# Patient Record
Sex: MALE | Race: WHITE | URBAN - METROPOLITAN AREA
[De-identification: names, ages, dates, MRNs, and addresses within clinical notes are randomized per-mention and may not be internally consistent; named-entity substitution may affect disease eponyms.]

---

## 2023-05-17 ENCOUNTER — HOSPITAL ENCOUNTER (OUTPATIENT)
Age: 73
Setting detail: SPECIMEN
Discharge: HOME | End: 2023-05-17

## 2023-05-17 LAB
ALBUMIN SERPL-MCNC: 3 G/DL (ref 3.5–5.2)
ALP SERPL-CCNC: 103 U/L (ref 40–129)
ALT SERPL-CCNC: 26 U/L (ref 5–41)
ANION GAP SERPL CALCULATED.3IONS-SCNC: 9 MMOL/L (ref 9–17)
AST SERPL-CCNC: 50 U/L
BILIRUB SERPL-MCNC: 3.2 MG/DL (ref 0.3–1.2)
BUN SERPL-MCNC: 9 MG/DL (ref 8–23)
BUN/CREAT SERPL: 12 (ref 9–20)
CALCIUM SERPL-MCNC: 8.1 MG/DL (ref 8.6–10.4)
CHLORIDE SERPL-SCNC: 103 MMOL/L (ref 98–107)
CO2 SERPL-SCNC: 29 MMOL/L (ref 20–31)
CREAT SERPL-MCNC: 0.78 MG/DL (ref 0.7–1.2)
ERYTHROCYTE [DISTWIDTH] IN BLOOD BY AUTOMATED COUNT: 19.3 % (ref 11.8–14.4)
GFR SERPL CREATININE-BSD FRML MDRD: >60 ML/MIN/1.73M2
GLUCOSE SERPL-MCNC: 131 MG/DL (ref 70–99)
HCT VFR BLD AUTO: 33.8 % (ref 40.7–50.3)
HGB BLD-MCNC: 10.1 G/DL (ref 13–17)
MCH RBC QN AUTO: 31.7 PG (ref 25.2–33.5)
MCHC RBC AUTO-ENTMCNC: 29.9 G/DL (ref 28.4–34.8)
MCV RBC AUTO: 106 FL (ref 82.6–102.9)
NRBC AUTOMATED: 0 PER 100 WBC
PLATELET # BLD AUTO: 88 K/UL (ref 138–453)
PMV BLD AUTO: 11.6 FL (ref 8.1–13.5)
POTASSIUM SERPL-SCNC: 3.8 MMOL/L (ref 3.7–5.3)
PROT SERPL-MCNC: 6.5 G/DL (ref 6.4–8.3)
RBC # BLD AUTO: 3.19 M/UL (ref 4.21–5.77)
SODIUM SERPL-SCNC: 141 MMOL/L (ref 135–144)
WBC OTHER # BLD: 6.9 K/UL (ref 3.5–11.3)

## 2023-05-17 PROCEDURE — 36415 COLL VENOUS BLD VENIPUNCTURE: CPT

## 2023-05-17 PROCEDURE — 85027 COMPLETE CBC AUTOMATED: CPT

## 2023-05-17 PROCEDURE — 80053 COMPREHEN METABOLIC PANEL: CPT

## 2023-05-17 PROCEDURE — P9603 ONE-WAY ALLOW PRORATED MILES: HCPCS

## 2023-05-19 ENCOUNTER — HOSPITAL ENCOUNTER (OUTPATIENT)
Age: 73
Setting detail: SPECIMEN
Discharge: HOME | End: 2023-05-19

## 2023-05-19 LAB — AMMONIA PLAS-SCNC: 35 UMOL/L (ref 16–60)

## 2023-05-19 PROCEDURE — 82140 ASSAY OF AMMONIA: CPT

## 2023-05-19 PROCEDURE — P9603 ONE-WAY ALLOW PRORATED MILES: HCPCS

## 2023-05-19 PROCEDURE — 36415 COLL VENOUS BLD VENIPUNCTURE: CPT

## 2023-05-24 ENCOUNTER — HOSPITAL ENCOUNTER (OUTPATIENT)
Age: 73
Setting detail: SPECIMEN
Discharge: HOME | End: 2023-05-24

## 2023-05-24 LAB
AMMONIA PLAS-SCNC: 31 UMOL/L (ref 16–60)
HCT VFR BLD AUTO: 29 % (ref 40.7–50.3)
HGB BLD-MCNC: 8.8 G/DL (ref 13–17)

## 2023-05-24 PROCEDURE — 36415 COLL VENOUS BLD VENIPUNCTURE: CPT

## 2023-05-24 PROCEDURE — P9603 ONE-WAY ALLOW PRORATED MILES: HCPCS

## 2023-05-24 PROCEDURE — 82140 ASSAY OF AMMONIA: CPT

## 2023-05-24 PROCEDURE — 85014 HEMATOCRIT: CPT

## 2023-05-24 PROCEDURE — 85018 HEMOGLOBIN: CPT

## 2023-12-12 ENCOUNTER — HOSPITAL ENCOUNTER (EMERGENCY)
Facility: CLINIC | Age: 73
Discharge: HOME | End: 2023-12-12
Attending: EMERGENCY MEDICINE
Payer: MEDICARE

## 2023-12-12 ENCOUNTER — APPOINTMENT (OUTPATIENT)
Dept: GENERAL RADIOLOGY | Facility: CLINIC | Age: 73
End: 2023-12-12
Payer: MEDICARE

## 2023-12-12 VITALS
SYSTOLIC BLOOD PRESSURE: 105 MMHG | DIASTOLIC BLOOD PRESSURE: 60 MMHG | TEMPERATURE: 97.9 F | RESPIRATION RATE: 18 BRPM | WEIGHT: 198 LBS | HEART RATE: 71 BPM | OXYGEN SATURATION: 99 %

## 2023-12-12 DIAGNOSIS — S70.01XA CONTUSION OF RIGHT HIP, INITIAL ENCOUNTER: Primary | ICD-10-CM

## 2023-12-12 PROCEDURE — 73502 X-RAY EXAM HIP UNI 2-3 VIEWS: CPT

## 2023-12-12 PROCEDURE — 99283 EMERGENCY DEPT VISIT LOW MDM: CPT

## 2023-12-12 RX ORDER — SPIRONOLACTONE 25 MG/1
25 TABLET ORAL
COMMUNITY
Start: 2023-05-17

## 2023-12-12 RX ORDER — MIRTAZAPINE 15 MG/1
15 TABLET, FILM COATED ORAL NIGHTLY
COMMUNITY
Start: 2023-08-25

## 2023-12-12 RX ORDER — HYDROCODONE BITARTRATE AND ACETAMINOPHEN 5; 325 MG/1; MG/1
1 TABLET ORAL EVERY 12 HOURS PRN
Qty: 6 TABLET | Refills: 0 | Status: SHIPPED | OUTPATIENT
Start: 2023-12-12 | End: 2023-12-15

## 2023-12-12 RX ORDER — FUROSEMIDE 40 MG/1
40 TABLET ORAL
COMMUNITY
Start: 2023-05-17

## 2023-12-12 RX ORDER — CARVEDILOL 3.12 MG/1
3.12 TABLET ORAL 2 TIMES DAILY WITH MEALS
COMMUNITY
Start: 2023-05-16

## 2023-12-12 ASSESSMENT — PAIN SCALES - GENERAL: PAINLEVEL_OUTOF10: 2

## 2023-12-12 ASSESSMENT — PAIN - FUNCTIONAL ASSESSMENT: PAIN_FUNCTIONAL_ASSESSMENT: 0-10

## 2024-07-15 ENCOUNTER — APPOINTMENT (OUTPATIENT)
Dept: GENERAL RADIOLOGY | Facility: CLINIC | Age: 74
End: 2024-07-15
Attending: EMERGENCY MEDICINE
Payer: MEDICARE

## 2024-07-15 ENCOUNTER — HOSPITAL ENCOUNTER (EMERGENCY)
Facility: CLINIC | Age: 74
Discharge: HOME | End: 2024-07-15
Attending: EMERGENCY MEDICINE
Payer: MEDICARE

## 2024-07-15 VITALS
TEMPERATURE: 98 F | HEIGHT: 70 IN | SYSTOLIC BLOOD PRESSURE: 112 MMHG | HEART RATE: 68 BPM | RESPIRATION RATE: 16 BRPM | BODY MASS INDEX: 27.2 KG/M2 | DIASTOLIC BLOOD PRESSURE: 76 MMHG | OXYGEN SATURATION: 97 % | WEIGHT: 190 LBS

## 2024-07-15 DIAGNOSIS — J40 BRONCHITIS: Primary | ICD-10-CM

## 2024-07-15 LAB
ALBUMIN SERPL-MCNC: 4 G/DL (ref 3.5–5.2)
ALBUMIN/GLOB SERPL: 1.2 {RATIO} (ref 1–2.5)
ALP SERPL-CCNC: 88 U/L (ref 40–129)
ALT SERPL-CCNC: 12 U/L (ref 5–41)
ANION GAP SERPL CALCULATED.3IONS-SCNC: 12 MMOL/L (ref 9–17)
AST SERPL-CCNC: 38 U/L
BASOPHILS # BLD: 0 K/UL (ref 0–0.2)
BASOPHILS NFR BLD: 1 % (ref 0–2)
BILIRUB SERPL-MCNC: 1.7 MG/DL (ref 0.3–1.2)
BNP SERPL-MCNC: 84 PG/ML
BUN SERPL-MCNC: 17 MG/DL (ref 8–23)
CALCIUM SERPL-MCNC: 9.6 MG/DL (ref 8.6–10.4)
CHLORIDE SERPL-SCNC: 102 MMOL/L (ref 98–107)
CO2 SERPL-SCNC: 26 MMOL/L (ref 20–31)
CREAT SERPL-MCNC: 0.9 MG/DL (ref 0.7–1.2)
EOSINOPHIL # BLD: 0.4 K/UL (ref 0–0.4)
EOSINOPHILS RELATIVE PERCENT: 5 % (ref 1–4)
ERYTHROCYTE [DISTWIDTH] IN BLOOD BY AUTOMATED COUNT: 14.2 % (ref 12.5–15.4)
GFR, ESTIMATED: 89 ML/MIN/1.73M2
GLUCOSE SERPL-MCNC: 113 MG/DL (ref 70–99)
HCT VFR BLD AUTO: 42 % (ref 41–53)
HGB BLD-MCNC: 14.7 G/DL (ref 13.5–17.5)
LACTATE BLDV-SCNC: 2 MMOL/L (ref 0.5–2.2)
LYMPHOCYTES NFR BLD: 1.6 K/UL (ref 1–4.8)
LYMPHOCYTES RELATIVE PERCENT: 22 % (ref 24–44)
MAGNESIUM SERPL-MCNC: 1.9 MG/DL (ref 1.6–2.6)
MCH RBC QN AUTO: 35.2 PG (ref 26–34)
MCHC RBC AUTO-ENTMCNC: 35 G/DL (ref 31–37)
MCV RBC AUTO: 100.7 FL (ref 80–100)
MONOCYTES NFR BLD: 0.6 K/UL (ref 0.1–1.2)
MONOCYTES NFR BLD: 8 % (ref 2–11)
NEUTROPHILS NFR BLD: 64 % (ref 36–66)
NEUTS SEG NFR BLD: 4.5 K/UL (ref 1.8–7.7)
PLATELET # BLD AUTO: 97 K/UL (ref 140–450)
PMV BLD AUTO: 8.3 FL (ref 6–12)
POTASSIUM SERPL-SCNC: 5.2 MMOL/L (ref 3.7–5.3)
PROT SERPL-MCNC: 7.4 G/DL (ref 6.4–8.3)
RBC # BLD AUTO: 4.17 M/UL (ref 4.5–5.9)
SODIUM SERPL-SCNC: 140 MMOL/L (ref 135–144)
TROPONIN I SERPL HS-MCNC: 8 NG/L (ref 0–22)
WBC OTHER # BLD: 7.1 K/UL (ref 3.5–11)

## 2024-07-15 PROCEDURE — 84484 ASSAY OF TROPONIN QUANT: CPT

## 2024-07-15 PROCEDURE — 83735 ASSAY OF MAGNESIUM: CPT

## 2024-07-15 PROCEDURE — 2580000003 HC RX 258: Performed by: EMERGENCY MEDICINE

## 2024-07-15 PROCEDURE — 83605 ASSAY OF LACTIC ACID: CPT

## 2024-07-15 PROCEDURE — 85025 COMPLETE CBC W/AUTO DIFF WBC: CPT

## 2024-07-15 PROCEDURE — 93005 ELECTROCARDIOGRAM TRACING: CPT | Performed by: EMERGENCY MEDICINE

## 2024-07-15 PROCEDURE — 99285 EMERGENCY DEPT VISIT HI MDM: CPT

## 2024-07-15 PROCEDURE — 71045 X-RAY EXAM CHEST 1 VIEW: CPT

## 2024-07-15 PROCEDURE — 96374 THER/PROPH/DIAG INJ IV PUSH: CPT

## 2024-07-15 PROCEDURE — 80053 COMPREHEN METABOLIC PANEL: CPT

## 2024-07-15 PROCEDURE — 36415 COLL VENOUS BLD VENIPUNCTURE: CPT

## 2024-07-15 PROCEDURE — 83880 ASSAY OF NATRIURETIC PEPTIDE: CPT

## 2024-07-15 PROCEDURE — 6370000000 HC RX 637 (ALT 250 FOR IP): Performed by: EMERGENCY MEDICINE

## 2024-07-15 PROCEDURE — 6360000002 HC RX W HCPCS: Performed by: EMERGENCY MEDICINE

## 2024-07-15 RX ORDER — IPRATROPIUM BROMIDE AND ALBUTEROL SULFATE 2.5; .5 MG/3ML; MG/3ML
1 SOLUTION RESPIRATORY (INHALATION) ONCE
Status: COMPLETED | OUTPATIENT
Start: 2024-07-15 | End: 2024-07-15

## 2024-07-15 RX ORDER — PREDNISONE 10 MG/1
10 TABLET ORAL SEE ADMIN INSTRUCTIONS
Qty: 17 TABLET | Refills: 0 | Status: SHIPPED | OUTPATIENT
Start: 2024-07-15 | End: 2024-07-21

## 2024-07-15 RX ORDER — AZITHROMYCIN 250 MG/1
TABLET, FILM COATED ORAL
Qty: 1 PACKET | Refills: 0 | Status: SHIPPED | OUTPATIENT
Start: 2024-07-15

## 2024-07-15 RX ADMIN — WATER 125 MG: 1 INJECTION INTRAMUSCULAR; INTRAVENOUS; SUBCUTANEOUS at 13:04

## 2024-07-15 RX ADMIN — IPRATROPIUM BROMIDE AND ALBUTEROL SULFATE 1 DOSE: .5; 2.5 SOLUTION RESPIRATORY (INHALATION) at 11:37

## 2024-07-15 ASSESSMENT — ENCOUNTER SYMPTOMS
SHORTNESS OF BREATH: 1
CHOKING: 1
DIARRHEA: 0
VOMITING: 0
SORE THROAT: 0

## 2024-07-15 NOTE — ED PROVIDER NOTES
[facility]  [address] [facility] [address]  Phone: [phone]        [facility]  EMERGENCY DEPARTMENT ENCOUNTER      Pt Name: Aiden Black  MRN: [id number]  Birthdate 1950  Date of evaluation: 7/15/2024  Provider: Aiden Shah DO    CHIEF COMPLAINT       Chief Complaint   Patient presents with    Shortness of Breath    Cough         HISTORY OF PRESENT ILLNESS   (Location/Symptom, Timing/Onset,Context/Setting, Quality, Duration, Modifying Factors, Severity)  Note limiting factors.   Aiden Black is a 73 y.o. male who presents to the emergency department for the evaluation of mild cough and shortness of breath.  His wife states has been going on for about 3 days.  Patient does not really have capability of speaking a whole lot but this is not new, she said he was intubated about a year ago and his voice has not been the same since.  He has been increasingly short of breath with even routine activities over the past few days.  Seems to be coughing as well.  No fever.  No vomiting.  No sick contact.  Denies chest pain.  No swelling in the legs.  He has been doing some breathing treatments at home which she says helps.  He says he feels fine at the moment    Nursing Notes were reviewed.    REVIEW OF SYSTEMS    (2-9systems for level 4, 10 or more for level 5)     Review of Systems   Constitutional:  Negative for fever.   HENT:  Negative for sore throat.    Respiratory:  Positive for choking and shortness of breath.    Cardiovascular:  Negative for chest pain.   Gastrointestinal:  Negative for diarrhea and vomiting.   Genitourinary:  Negative for dysuria.   Skin:  Negative for rash.   Neurological:  Negative for weakness.   All other systems reviewed and are negative.      Except asnoted above the remainder of the review of systems was reviewed and negative.       PAST MEDICAL HISTORY     Past Medical History:   Diagnosis Date    Arthritis     Cirrhosis (HCC)     Emphysema lung

## 2024-07-16 LAB
EKG ATRIAL RATE: 68 BPM
EKG P AXIS: -26 DEGREES
EKG P-R INTERVAL: 196 MS
EKG Q-T INTERVAL: 408 MS
EKG QRS DURATION: 78 MS
EKG QTC CALCULATION (BAZETT): 433 MS
EKG R AXIS: 57 DEGREES
EKG T AXIS: 77 DEGREES
EKG VENTRICULAR RATE: 68 BPM

## 2024-08-09 ENCOUNTER — APPOINTMENT (OUTPATIENT)
Dept: GENERAL RADIOLOGY | Facility: CLINIC | Age: 74
End: 2024-08-09
Attending: EMERGENCY MEDICINE
Payer: MEDICARE

## 2024-08-09 ENCOUNTER — HOSPITAL ENCOUNTER (INPATIENT)
Age: 74
LOS: 12 days | Discharge: HOSPICE | End: 2024-08-22
Attending: EMERGENCY MEDICINE | Admitting: FAMILY MEDICINE
Payer: MEDICARE

## 2024-08-09 DIAGNOSIS — R00.1 BRADYCARDIA: ICD-10-CM

## 2024-08-09 DIAGNOSIS — J44.1 COPD EXACERBATION (HCC): Primary | ICD-10-CM

## 2024-08-09 DIAGNOSIS — R09.02 HYPOXEMIA: ICD-10-CM

## 2024-08-09 LAB
ALBUMIN SERPL-MCNC: 3.7 G/DL (ref 3.5–5.2)
ALBUMIN/GLOB SERPL: 1.1 {RATIO} (ref 1–2.5)
ALP SERPL-CCNC: 122 U/L (ref 40–129)
ALT SERPL-CCNC: 19 U/L (ref 5–41)
ANION GAP SERPL CALCULATED.3IONS-SCNC: 9 MMOL/L (ref 9–17)
AST SERPL-CCNC: 38 U/L
BASOPHILS # BLD: 0 K/UL (ref 0–0.2)
BASOPHILS NFR BLD: 1 % (ref 0–2)
BILIRUB SERPL-MCNC: 1.3 MG/DL (ref 0.3–1.2)
BNP SERPL-MCNC: 160 PG/ML
BUN SERPL-MCNC: 16 MG/DL (ref 8–23)
CALCIUM SERPL-MCNC: 9.4 MG/DL (ref 8.6–10.4)
CHLORIDE SERPL-SCNC: 100 MMOL/L (ref 98–107)
CO2 SERPL-SCNC: 31 MMOL/L (ref 20–31)
CREAT SERPL-MCNC: 1 MG/DL (ref 0.7–1.2)
EOSINOPHIL # BLD: 0.2 K/UL (ref 0–0.4)
EOSINOPHILS RELATIVE PERCENT: 4 % (ref 1–4)
ERYTHROCYTE [DISTWIDTH] IN BLOOD BY AUTOMATED COUNT: 14.1 % (ref 12.5–15.4)
GFR, ESTIMATED: 78 ML/MIN/1.73M2
GLUCOSE SERPL-MCNC: 104 MG/DL (ref 70–99)
HCT VFR BLD AUTO: 42.6 % (ref 41–53)
HGB BLD-MCNC: 14.8 G/DL (ref 13.5–17.5)
LACTATE BLDV-SCNC: 1.7 MMOL/L (ref 0.5–2.2)
LYMPHOCYTES NFR BLD: 1.9 K/UL (ref 1–4.8)
LYMPHOCYTES RELATIVE PERCENT: 27 % (ref 24–44)
MCH RBC QN AUTO: 35.3 PG (ref 26–34)
MCHC RBC AUTO-ENTMCNC: 34.8 G/DL (ref 31–37)
MCV RBC AUTO: 101.5 FL (ref 80–100)
MONOCYTES NFR BLD: 0.6 K/UL (ref 0.1–1.2)
MONOCYTES NFR BLD: 9 % (ref 2–11)
NEUTROPHILS NFR BLD: 59 % (ref 36–66)
NEUTS SEG NFR BLD: 4.1 K/UL (ref 1.8–7.7)
PLATELET # BLD AUTO: 79 K/UL (ref 140–450)
PMV BLD AUTO: 8.3 FL (ref 6–12)
POTASSIUM SERPL-SCNC: 4.9 MMOL/L (ref 3.7–5.3)
PROT SERPL-MCNC: 7 G/DL (ref 6.4–8.3)
RBC # BLD AUTO: 4.19 M/UL (ref 4.5–5.9)
SARS-COV-2 RDRP RESP QL NAA+PROBE: NOT DETECTED
SODIUM SERPL-SCNC: 140 MMOL/L (ref 135–144)
SPECIMEN DESCRIPTION: NORMAL
TROPONIN I SERPL HS-MCNC: 11 NG/L (ref 0–22)
WBC OTHER # BLD: 6.9 K/UL (ref 3.5–11)

## 2024-08-09 PROCEDURE — 87635 SARS-COV-2 COVID-19 AMP PRB: CPT

## 2024-08-09 PROCEDURE — 36415 COLL VENOUS BLD VENIPUNCTURE: CPT

## 2024-08-09 PROCEDURE — 2580000003 HC RX 258: Performed by: EMERGENCY MEDICINE

## 2024-08-09 PROCEDURE — 6370000000 HC RX 637 (ALT 250 FOR IP): Performed by: EMERGENCY MEDICINE

## 2024-08-09 PROCEDURE — 96374 THER/PROPH/DIAG INJ IV PUSH: CPT

## 2024-08-09 PROCEDURE — 71045 X-RAY EXAM CHEST 1 VIEW: CPT

## 2024-08-09 PROCEDURE — 83605 ASSAY OF LACTIC ACID: CPT

## 2024-08-09 PROCEDURE — 93005 ELECTROCARDIOGRAM TRACING: CPT | Performed by: EMERGENCY MEDICINE

## 2024-08-09 PROCEDURE — 99285 EMERGENCY DEPT VISIT HI MDM: CPT

## 2024-08-09 PROCEDURE — 84484 ASSAY OF TROPONIN QUANT: CPT

## 2024-08-09 PROCEDURE — 80053 COMPREHEN METABOLIC PANEL: CPT

## 2024-08-09 PROCEDURE — 6360000002 HC RX W HCPCS: Performed by: EMERGENCY MEDICINE

## 2024-08-09 PROCEDURE — 83880 ASSAY OF NATRIURETIC PEPTIDE: CPT

## 2024-08-09 PROCEDURE — 85025 COMPLETE CBC W/AUTO DIFF WBC: CPT

## 2024-08-09 RX ORDER — IPRATROPIUM BROMIDE AND ALBUTEROL SULFATE 2.5; .5 MG/3ML; MG/3ML
1 SOLUTION RESPIRATORY (INHALATION) ONCE
Status: COMPLETED | OUTPATIENT
Start: 2024-08-09 | End: 2024-08-09

## 2024-08-09 RX ORDER — FAMOTIDINE 40 MG/1
40 TABLET, FILM COATED ORAL EVERY EVENING
COMMUNITY

## 2024-08-09 RX ADMIN — IPRATROPIUM BROMIDE AND ALBUTEROL SULFATE 1 DOSE: 2.5; .5 SOLUTION RESPIRATORY (INHALATION) at 23:20

## 2024-08-09 RX ADMIN — IPRATROPIUM BROMIDE AND ALBUTEROL SULFATE 1 DOSE: 2.5; .5 SOLUTION RESPIRATORY (INHALATION) at 18:56

## 2024-08-09 RX ADMIN — WATER 125 MG: 1 INJECTION INTRAMUSCULAR; INTRAVENOUS; SUBCUTANEOUS at 18:59

## 2024-08-09 NOTE — ED PROVIDER NOTES
[facility]  [address] [facility] [address]  Phone: [phone]    eMERGENCY dEPARTMENT eNCOUnter        Pt Name: Aiden Black  MRN: [id number]  Birthdate 1950  Date of evaluation: 8/9/24    CHIEF COMPLAINT     Chief Complaint   Patient presents with    Shortness of Breath       HISTORY OF PRESENT ILLNESS    Aiden Black is a 73 y.o. male who presents to the emergency department from home accompanied by family with shortness of breath.  The patient wife provided much of the details stating that since his discharge recently he has not been feeling well nor recovered.  He was seen and evaluated here in the emergency department about a month ago for something similar was placed on nebulizer breathing treatments for which the patient initiated for the first time last evening however he did complete the antibiotic that was prescribed.  Since then he did follow-up with his family doctor who represcribed in the [facility] and informed him to continue with his nebulizer breathing treatment.  His wife stated that things have progressed which was most notably noted last evening when he took a breathing treatment for the first time.  He has had cough and congestion.  He denies any fevers or chills.  He has chest pain and wheezing and chest tightness.  No abdominal pain nausea vomiting diarrhea.  No leg pain or swelling.  No recent injury or trauma.  He does have a longstanding history of tobacco abuse 3 packs/day up until last year.  He also drink alcohol daily for which he has discontinued since last year.  He was hospitalized a year ago for hematemesis and for airway protection he underwent intubation.  During that time he had some vocal cord abnormalities and since his voice has been raspy and very difficult to speak which is at his baseline today per his wife.  She stated that he was diagnosed with emphysema but has never been seen by a pulmonologist nor fully worked up nor treated for.    REVIEW OF SYSTEMS  initiated including CBC CMP troponin BNP chest x-ray EKG on lactic acid.  I informed the patient as well as the family that he will need to be transferred to higher level of care.  They requested [facility] given the fact that he was last hospitalized at [facility].  At this point time all his blood work and imaging are pending in the case we signed out to Dr. Diony Ambriz to follow through and following the labs results and making the disposition to another facility of higher level of care.              DIAGNOSTIC RESULTS   EKG read interpreted by myself showing sinus rhythm with premature atrial complexes low voltage QRS ventricular rate is 70 normal axis NH interval 198 QRS 78 QTc 425 .  One isolated T wave inversion in V1 biphasic P waves noted in the precordial leads as well as inferior leads.   No old EKG available for review.  LABS:      All other labs were within normal range or not returned as of this dictation.    RADIOLOGY:    XR CHEST PORTABLE    Result Date: 8/9/2024  EXAMINATION: ONE XRAY VIEW OF THE CHEST 8/9/2024 6:56 pm COMPARISON: July 15, 2024 HISTORY: ORDERING SYSTEM PROVIDED HISTORY: sob TECHNOLOGIST PROVIDED HISTORY: sob Reason for Exam: SOB x 2 weeks FINDINGS: No consolidation or venous congestion.No pleural effusion or pneumothorax identified.     No acute cardiopulmonary disease.     XR CHEST PORTABLE    Result Date: 7/15/2024  EXAMINATION: ONE XRAY VIEW OF THE CHEST 7/15/2024 11:49 am COMPARISON: None. HISTORY: ORDERING SYSTEM PROVIDED HISTORY: short of breath TECHNOLOGIST PROVIDED HISTORY: short of breath Reason for Exam: SOB, cough; HX emphysema, liver cirrhosis FINDINGS: The cardiomediastinal silhouette is within normal limits. There is no consolidation, pneumothorax or evidence for edema. No evidence for effusion. No acute osseous abnormality is identified.     No acute airspace disease identified.         I have reviewed the disposition diagnosis with the patient and or their

## 2024-08-10 ENCOUNTER — APPOINTMENT (OUTPATIENT)
Dept: CT IMAGING | Age: 74
End: 2024-08-10
Payer: MEDICARE

## 2024-08-10 ENCOUNTER — APPOINTMENT (OUTPATIENT)
Dept: GENERAL RADIOLOGY | Age: 74
End: 2024-08-10
Payer: MEDICARE

## 2024-08-10 PROBLEM — R53.83 FATIGUE: Status: ACTIVE | Noted: 2020-07-17

## 2024-08-10 PROBLEM — R09.02 HYPOXIA: Status: ACTIVE | Noted: 2024-08-10

## 2024-08-10 PROBLEM — D69.6 THROMBOCYTOPENIA (HCC): Status: ACTIVE | Noted: 2020-07-17

## 2024-08-10 PROBLEM — J44.1 COPD EXACERBATION (HCC): Status: ACTIVE | Noted: 2024-08-10

## 2024-08-10 PROBLEM — K57.92 DIVERTICULITIS: Status: ACTIVE | Noted: 2023-05-04

## 2024-08-10 PROBLEM — I86.4 GASTRIC VARICES: Status: ACTIVE | Noted: 2023-05-03

## 2024-08-10 LAB
ALBUMIN SERPL-MCNC: 3.2 G/DL (ref 3.5–5.2)
ALBUMIN SERPL-MCNC: 3.9 G/DL (ref 3.5–5.2)
ALP SERPL-CCNC: 115 U/L (ref 40–129)
ALP SERPL-CCNC: 73 U/L (ref 40–129)
ALT SERPL-CCNC: 20 U/L (ref 5–41)
ALT SERPL-CCNC: 26 U/L (ref 5–41)
AMMONIA PLAS-SCNC: 67 UMOL/L (ref 16–60)
AMPHET UR QL SCN: NEGATIVE
ANION GAP SERPL CALCULATED.3IONS-SCNC: 15 MMOL/L (ref 9–17)
ANION GAP SERPL CALCULATED.3IONS-SCNC: 7 MMOL/L (ref 9–17)
AST SERPL-CCNC: 25 U/L
AST SERPL-CCNC: 27 U/L
B PARAP IS1001 DNA NPH QL NAA+NON-PROBE: NOT DETECTED
B PERT DNA SPEC QL NAA+PROBE: NOT DETECTED
BARBITURATES UR QL SCN: NEGATIVE
BASOPHILS # BLD: 0 K/UL (ref 0–0.2)
BASOPHILS # BLD: <0.03 K/UL (ref 0–0.2)
BASOPHILS NFR BLD: 0 %
BASOPHILS NFR BLD: 0 % (ref 0–2)
BENZODIAZ UR QL: NEGATIVE
BILIRUB DIRECT SERPL-MCNC: 0.7 MG/DL
BILIRUB SERPL-MCNC: 1.5 MG/DL (ref 0.3–1.2)
BILIRUB SERPL-MCNC: 2.8 MG/DL (ref 0.3–1.2)
BUN SERPL-MCNC: 18 MG/DL (ref 8–23)
BUN SERPL-MCNC: 23 MG/DL (ref 8–23)
BUN/CREAT SERPL: 16 (ref 9–20)
BUN/CREAT SERPL: 26 (ref 9–20)
C PNEUM DNA NPH QL NAA+NON-PROBE: NOT DETECTED
CALCIUM SERPL-MCNC: 9 MG/DL (ref 8.6–10.4)
CALCIUM SERPL-MCNC: 9.6 MG/DL (ref 8.6–10.4)
CANNABINOIDS UR QL SCN: NEGATIVE
CHLORIDE SERPL-SCNC: 102 MMOL/L (ref 98–107)
CHLORIDE SERPL-SCNC: 105 MMOL/L (ref 98–107)
CO2 SERPL-SCNC: 22 MMOL/L (ref 20–31)
CO2 SERPL-SCNC: 33 MMOL/L (ref 20–31)
COCAINE UR QL SCN: NEGATIVE
CREAT SERPL-MCNC: 0.9 MG/DL (ref 0.7–1.2)
CREAT SERPL-MCNC: 1.1 MG/DL (ref 0.7–1.2)
CRITICAL ACTION: NORMAL
CRITICAL ACTION: NORMAL
CRITICAL NOTIFICATION DATE/TIME: NORMAL
CRITICAL NOTIFICATION DATE/TIME: NORMAL
CRITICAL VALUE READ BACK: YES
CRITICAL VALUE READ BACK: YES
EKG ATRIAL RATE: 70 BPM
EKG P-R INTERVAL: 198 MS
EKG Q-T INTERVAL: 394 MS
EKG QRS DURATION: 78 MS
EKG QTC CALCULATION (BAZETT): 425 MS
EKG R AXIS: 71 DEGREES
EKG T AXIS: 79 DEGREES
EKG VENTRICULAR RATE: 70 BPM
EOSINOPHIL # BLD: 0 K/UL (ref 0–0.4)
EOSINOPHIL # BLD: <0.03 K/UL (ref 0–0.44)
EOSINOPHILS RELATIVE PERCENT: 0 % (ref 1–4)
EOSINOPHILS RELATIVE PERCENT: 0 % (ref 1–4)
ERYTHROCYTE [DISTWIDTH] IN BLOOD BY AUTOMATED COUNT: 13.4 % (ref 11.8–14.4)
ERYTHROCYTE [DISTWIDTH] IN BLOOD BY AUTOMATED COUNT: 13.5 % (ref 11.8–14.4)
FENTANYL UR QL: NEGATIVE
FIO2: 30
FIO2: 35
FIO2: 60
FLUAV RNA NPH QL NAA+NON-PROBE: NOT DETECTED
FLUBV RNA NPH QL NAA+NON-PROBE: NOT DETECTED
GFR, ESTIMATED: 70 ML/MIN/1.73M2
GFR, ESTIMATED: 89 ML/MIN/1.73M2
GLUCOSE BLD-MCNC: 162 MG/DL (ref 75–110)
GLUCOSE SERPL-MCNC: 114 MG/DL (ref 70–99)
GLUCOSE SERPL-MCNC: 177 MG/DL (ref 70–99)
HADV DNA NPH QL NAA+NON-PROBE: NOT DETECTED
HCOV 229E RNA NPH QL NAA+NON-PROBE: NOT DETECTED
HCOV HKU1 RNA NPH QL NAA+NON-PROBE: NOT DETECTED
HCOV NL63 RNA NPH QL NAA+NON-PROBE: NOT DETECTED
HCOV OC43 RNA NPH QL NAA+NON-PROBE: NOT DETECTED
HCT VFR BLD AUTO: 42.2 % (ref 40.7–50.3)
HCT VFR BLD AUTO: 47.3 % (ref 40.7–50.3)
HGB BLD-MCNC: 14.6 G/DL (ref 13–17)
HGB BLD-MCNC: 15.7 G/DL (ref 13–17)
HMPV RNA NPH QL NAA+NON-PROBE: NOT DETECTED
HPIV1 RNA NPH QL NAA+NON-PROBE: NOT DETECTED
HPIV2 RNA NPH QL NAA+NON-PROBE: NOT DETECTED
HPIV3 RNA NPH QL NAA+NON-PROBE: NOT DETECTED
HPIV4 RNA NPH QL NAA+NON-PROBE: NOT DETECTED
IMM GRANULOCYTES # BLD AUTO: 0 K/UL (ref 0–0.3)
IMM GRANULOCYTES # BLD AUTO: 0.04 K/UL (ref 0–0.3)
IMM GRANULOCYTES NFR BLD: 0 %
IMM GRANULOCYTES NFR BLD: 0 %
INR PPP: 1.4
LACTATE BLDV-SCNC: 3.4 MMOL/L (ref 0.5–1.9)
LACTATE BLDV-SCNC: 3.7 MMOL/L (ref 0.5–1.9)
LYMPHOCYTES NFR BLD: 0.41 K/UL (ref 1–4.8)
LYMPHOCYTES NFR BLD: 1.41 K/UL (ref 1.1–3.7)
LYMPHOCYTES RELATIVE PERCENT: 15 % (ref 24–43)
LYMPHOCYTES RELATIVE PERCENT: 9 % (ref 24–44)
M PNEUMO DNA NPH QL NAA+NON-PROBE: NOT DETECTED
MAGNESIUM SERPL-MCNC: 1.7 MG/DL (ref 1.6–2.6)
MCH RBC QN AUTO: 35 PG (ref 25.2–33.5)
MCH RBC QN AUTO: 35.4 PG (ref 25.2–33.5)
MCHC RBC AUTO-ENTMCNC: 33.2 G/DL (ref 28.4–34.8)
MCHC RBC AUTO-ENTMCNC: 34.6 G/DL (ref 28.4–34.8)
MCV RBC AUTO: 102.4 FL (ref 82.6–102.9)
MCV RBC AUTO: 105.6 FL (ref 82.6–102.9)
METHADONE UR QL: NEGATIVE
MODE: ABNORMAL
MODE: AC
MODE: AC
MONOCYTES NFR BLD: 0.09 K/UL (ref 0.2–0.8)
MONOCYTES NFR BLD: 0.23 K/UL (ref 0.1–1.2)
MONOCYTES NFR BLD: 2 % (ref 1–7)
MONOCYTES NFR BLD: 3 % (ref 3–12)
MYOGLOBIN SERPL-MCNC: 25 NG/ML (ref 28–72)
MYOGLOBIN SERPL-MCNC: 47 NG/ML (ref 28–72)
MYOGLOBIN SERPL-MCNC: 60 NG/ML (ref 28–72)
NEUTROPHILS NFR BLD: 82 % (ref 36–65)
NEUTROPHILS NFR BLD: 89 % (ref 36–66)
NEUTS SEG NFR BLD: 4.1 K/UL (ref 1.8–7.7)
NEUTS SEG NFR BLD: 7.54 K/UL (ref 1.5–8.1)
NRBC BLD-RTO: 0 PER 100 WBC
NRBC BLD-RTO: 0 PER 100 WBC
O2 DELIVERY DEVICE: ABNORMAL
OPIATES UR QL SCN: NEGATIVE
OXYCODONE UR QL SCN: NEGATIVE
PARTIAL THROMBOPLASTIN TIME: 25.2 SEC (ref 23.9–33.8)
PCP UR QL SCN: NEGATIVE
PHOSPHATE SERPL-MCNC: 0.9 MG/DL (ref 2.5–4.5)
PHOSPHATE SERPL-MCNC: 2.1 MG/DL (ref 2.5–4.5)
PLATELET # BLD AUTO: 101 K/UL (ref 138–453)
PLATELET # BLD AUTO: 72 K/UL (ref 138–453)
PMV BLD AUTO: 10.4 FL (ref 8.1–13.5)
PMV BLD AUTO: 10.6 FL (ref 8.1–13.5)
POC HCO3: 26.4 MMOL/L (ref 21–28)
POC HCO3: 27.5 MMOL/L (ref 21–28)
POC HCO3: 38 MMOL/L (ref 21–28)
POC HCO3: 41.3 MMOL/L (ref 21–28)
POC O2 SATURATION: 94.6 % (ref 94–98)
POC O2 SATURATION: 97.7 % (ref 94–98)
POC O2 SATURATION: 99.3 % (ref 94–98)
POC O2 SATURATION: 99.9 % (ref 94–98)
POC PCO2: 102 MM HG (ref 35–48)
POC PCO2: 119.3 MM HG (ref 35–48)
POC PCO2: 32 MM HG (ref 35–48)
POC PCO2: 44.8 MM HG (ref 35–48)
POC PH: 7.15 (ref 7.35–7.45)
POC PH: 7.18 (ref 7.35–7.45)
POC PH: 7.4 (ref 7.35–7.45)
POC PH: 7.53 (ref 7.35–7.45)
POC PO2: 138.6 MM HG (ref 83–108)
POC PO2: 153.8 MM HG (ref 83–108)
POC PO2: 267.7 MM HG (ref 83–108)
POC PO2: 98 MM HG (ref 83–108)
POSITIVE BASE EXCESS, ART: 2.1 MMOL/L (ref 0–3)
POSITIVE BASE EXCESS, ART: 4.2 MMOL/L (ref 0–3)
POSITIVE BASE EXCESS, ART: 4.9 MMOL/L (ref 0–3)
POSITIVE BASE EXCESS, ART: 6.7 MMOL/L (ref 0–3)
POTASSIUM SERPL-SCNC: 4 MMOL/L (ref 3.7–5.3)
POTASSIUM SERPL-SCNC: 5.2 MMOL/L (ref 3.7–5.3)
PROCALCITONIN SERPL-MCNC: 0.08 NG/ML (ref 0–0.09)
PROT SERPL-MCNC: 6 G/DL (ref 6.4–8.3)
PROT SERPL-MCNC: 7.3 G/DL (ref 6.4–8.3)
PROTHROMBIN TIME: 16.8 SEC (ref 11.5–14.2)
RBC # BLD AUTO: 4.12 M/UL (ref 4.21–5.77)
RBC # BLD AUTO: 4.48 M/UL (ref 4.21–5.77)
RSV RNA NPH QL NAA+NON-PROBE: NOT DETECTED
RV+EV RNA NPH QL NAA+NON-PROBE: NOT DETECTED
SARS-COV-2 RNA NPH QL NAA+NON-PROBE: NOT DETECTED
SODIUM SERPL-SCNC: 142 MMOL/L (ref 135–144)
SODIUM SERPL-SCNC: 142 MMOL/L (ref 135–144)
SPECIMEN DESCRIPTION: NORMAL
TEST INFORMATION: NORMAL
TROPONIN I SERPL HS-MCNC: 10 NG/L (ref 0–22)
TROPONIN I SERPL HS-MCNC: 15 NG/L (ref 0–22)
TROPONIN I SERPL HS-MCNC: 6 NG/L (ref 0–22)
WBC OTHER # BLD: 4.6 K/UL (ref 3.5–11.3)
WBC OTHER # BLD: 9.2 K/UL (ref 3.5–11.3)

## 2024-08-10 PROCEDURE — 70450 CT HEAD/BRAIN W/O DYE: CPT

## 2024-08-10 PROCEDURE — 94660 CPAP INITIATION&MGMT: CPT

## 2024-08-10 PROCEDURE — 85730 THROMBOPLASTIN TIME PARTIAL: CPT

## 2024-08-10 PROCEDURE — 82947 ASSAY GLUCOSE BLOOD QUANT: CPT

## 2024-08-10 PROCEDURE — 71045 X-RAY EXAM CHEST 1 VIEW: CPT

## 2024-08-10 PROCEDURE — 2700000000 HC OXYGEN THERAPY PER DAY

## 2024-08-10 PROCEDURE — 36620 INSERTION CATHETER ARTERY: CPT

## 2024-08-10 PROCEDURE — 74018 RADEX ABDOMEN 1 VIEW: CPT

## 2024-08-10 PROCEDURE — 82248 BILIRUBIN DIRECT: CPT

## 2024-08-10 PROCEDURE — 84100 ASSAY OF PHOSPHORUS: CPT

## 2024-08-10 PROCEDURE — 6360000002 HC RX W HCPCS: Performed by: STUDENT IN AN ORGANIZED HEALTH CARE EDUCATION/TRAINING PROGRAM

## 2024-08-10 PROCEDURE — 36415 COLL VENOUS BLD VENIPUNCTURE: CPT

## 2024-08-10 PROCEDURE — 0BH17EZ INSERTION OF ENDOTRACHEAL AIRWAY INTO TRACHEA, VIA NATURAL OR ARTIFICIAL OPENING: ICD-10-PCS | Performed by: FAMILY MEDICINE

## 2024-08-10 PROCEDURE — 82140 ASSAY OF AMMONIA: CPT

## 2024-08-10 PROCEDURE — 6370000000 HC RX 637 (ALT 250 FOR IP): Performed by: INTERNAL MEDICINE

## 2024-08-10 PROCEDURE — 6360000002 HC RX W HCPCS: Performed by: INTERNAL MEDICINE

## 2024-08-10 PROCEDURE — 2580000003 HC RX 258: Performed by: INTERNAL MEDICINE

## 2024-08-10 PROCEDURE — 80307 DRUG TEST PRSMV CHEM ANLYZR: CPT

## 2024-08-10 PROCEDURE — 31500 INSERT EMERGENCY AIRWAY: CPT

## 2024-08-10 PROCEDURE — 36600 WITHDRAWAL OF ARTERIAL BLOOD: CPT

## 2024-08-10 PROCEDURE — 85025 COMPLETE CBC W/AUTO DIFF WBC: CPT

## 2024-08-10 PROCEDURE — 83605 ASSAY OF LACTIC ACID: CPT

## 2024-08-10 PROCEDURE — 85610 PROTHROMBIN TIME: CPT

## 2024-08-10 PROCEDURE — 2000000000 HC ICU R&B

## 2024-08-10 PROCEDURE — 80053 COMPREHEN METABOLIC PANEL: CPT

## 2024-08-10 PROCEDURE — 2500000003 HC RX 250 WO HCPCS: Performed by: INTERNAL MEDICINE

## 2024-08-10 PROCEDURE — 84145 PROCALCITONIN (PCT): CPT

## 2024-08-10 PROCEDURE — 2500000003 HC RX 250 WO HCPCS: Performed by: STUDENT IN AN ORGANIZED HEALTH CARE EDUCATION/TRAINING PROGRAM

## 2024-08-10 PROCEDURE — 02HV33Z INSERTION OF INFUSION DEVICE INTO SUPERIOR VENA CAVA, PERCUTANEOUS APPROACH: ICD-10-PCS | Performed by: FAMILY MEDICINE

## 2024-08-10 PROCEDURE — 82803 BLOOD GASES ANY COMBINATION: CPT

## 2024-08-10 PROCEDURE — 6360000002 HC RX W HCPCS: Performed by: NURSE PRACTITIONER

## 2024-08-10 PROCEDURE — 83735 ASSAY OF MAGNESIUM: CPT

## 2024-08-10 PROCEDURE — 03HY32Z INSERTION OF MONITORING DEVICE INTO UPPER ARTERY, PERCUTANEOUS APPROACH: ICD-10-PCS | Performed by: FAMILY MEDICINE

## 2024-08-10 PROCEDURE — 3E033XZ INTRODUCTION OF VASOPRESSOR INTO PERIPHERAL VEIN, PERCUTANEOUS APPROACH: ICD-10-PCS | Performed by: INTERNAL MEDICINE

## 2024-08-10 PROCEDURE — 87641 MR-STAPH DNA AMP PROBE: CPT

## 2024-08-10 PROCEDURE — 37799 UNLISTED PX VASCULAR SURGERY: CPT

## 2024-08-10 PROCEDURE — 2580000003 HC RX 258: Performed by: NURSE PRACTITIONER

## 2024-08-10 PROCEDURE — 6370000000 HC RX 637 (ALT 250 FOR IP): Performed by: FAMILY MEDICINE

## 2024-08-10 PROCEDURE — 84484 ASSAY OF TROPONIN QUANT: CPT

## 2024-08-10 PROCEDURE — 94002 VENT MGMT INPAT INIT DAY: CPT

## 2024-08-10 PROCEDURE — 0202U NFCT DS 22 TRGT SARS-COV-2: CPT

## 2024-08-10 PROCEDURE — 87040 BLOOD CULTURE FOR BACTERIA: CPT

## 2024-08-10 PROCEDURE — 94640 AIRWAY INHALATION TREATMENT: CPT

## 2024-08-10 PROCEDURE — 83874 ASSAY OF MYOGLOBIN: CPT

## 2024-08-10 PROCEDURE — 94761 N-INVAS EAR/PLS OXIMETRY MLT: CPT

## 2024-08-10 RX ORDER — SUCCINYLCHOLINE/SOD CL,ISO/PF 100 MG/5ML
100 SYRINGE (ML) INTRAVENOUS ONCE
Status: COMPLETED | OUTPATIENT
Start: 2024-08-10 | End: 2024-08-10

## 2024-08-10 RX ORDER — MAGNESIUM SULFATE 1 G/100ML
1000 INJECTION INTRAVENOUS PRN
Status: DISCONTINUED | OUTPATIENT
Start: 2024-08-10 | End: 2024-08-22 | Stop reason: HOSPADM

## 2024-08-10 RX ORDER — LACTULOSE 10 G/15ML
20 SOLUTION ORAL 3 TIMES DAILY
Status: ON HOLD | COMMUNITY
Start: 2023-05-16 | End: 2024-08-22 | Stop reason: HOSPADM

## 2024-08-10 RX ORDER — IPRATROPIUM BROMIDE AND ALBUTEROL SULFATE 2.5; .5 MG/3ML; MG/3ML
1 SOLUTION RESPIRATORY (INHALATION)
Status: DISCONTINUED | OUTPATIENT
Start: 2024-08-10 | End: 2024-08-11

## 2024-08-10 RX ORDER — FLUTICASONE PROPIONATE 50 MCG
2 SPRAY, SUSPENSION (ML) NASAL DAILY
Status: DISCONTINUED | OUTPATIENT
Start: 2024-08-10 | End: 2024-08-10

## 2024-08-10 RX ORDER — NALOXONE HYDROCHLORIDE 0.4 MG/ML
0.4 INJECTION, SOLUTION INTRAMUSCULAR; INTRAVENOUS; SUBCUTANEOUS ONCE
Status: COMPLETED | OUTPATIENT
Start: 2024-08-10 | End: 2024-08-10

## 2024-08-10 RX ORDER — 0.9 % SODIUM CHLORIDE 0.9 %
500 INTRAVENOUS SOLUTION INTRAVENOUS ONCE
Status: COMPLETED | OUTPATIENT
Start: 2024-08-10 | End: 2024-08-10

## 2024-08-10 RX ORDER — ONDANSETRON 4 MG/1
4 TABLET, ORALLY DISINTEGRATING ORAL EVERY 8 HOURS PRN
Status: DISCONTINUED | OUTPATIENT
Start: 2024-08-10 | End: 2024-08-22 | Stop reason: HOSPADM

## 2024-08-10 RX ORDER — POTASSIUM CHLORIDE 7.45 MG/ML
10 INJECTION INTRAVENOUS PRN
Status: DISCONTINUED | OUTPATIENT
Start: 2024-08-10 | End: 2024-08-11

## 2024-08-10 RX ORDER — CETIRIZINE HYDROCHLORIDE 10 MG/1
10 TABLET ORAL DAILY
Status: DISCONTINUED | OUTPATIENT
Start: 2024-08-10 | End: 2024-08-16

## 2024-08-10 RX ORDER — MIRTAZAPINE 15 MG/1
15 TABLET, FILM COATED ORAL NIGHTLY
Status: DISCONTINUED | OUTPATIENT
Start: 2024-08-10 | End: 2024-08-13

## 2024-08-10 RX ORDER — ALBUTEROL SULFATE 0.83 MG/ML
2.5 SOLUTION RESPIRATORY (INHALATION) 3 TIMES DAILY
Status: ON HOLD | COMMUNITY
Start: 2024-02-22 | End: 2024-08-22 | Stop reason: HOSPADM

## 2024-08-10 RX ORDER — ACETAMINOPHEN 650 MG/1
650 SUPPOSITORY RECTAL EVERY 6 HOURS PRN
Status: DISCONTINUED | OUTPATIENT
Start: 2024-08-10 | End: 2024-08-22 | Stop reason: HOSPADM

## 2024-08-10 RX ORDER — ACETAMINOPHEN 325 MG/1
650 TABLET ORAL EVERY 6 HOURS PRN
Status: ON HOLD | COMMUNITY
Start: 2023-05-16 | End: 2024-08-10

## 2024-08-10 RX ORDER — ONDANSETRON 2 MG/ML
4 INJECTION INTRAMUSCULAR; INTRAVENOUS EVERY 6 HOURS PRN
Status: DISCONTINUED | OUTPATIENT
Start: 2024-08-10 | End: 2024-08-22 | Stop reason: HOSPADM

## 2024-08-10 RX ORDER — ACETAMINOPHEN 325 MG/1
650 TABLET ORAL EVERY 6 HOURS PRN
Status: DISCONTINUED | OUTPATIENT
Start: 2024-08-10 | End: 2024-08-22 | Stop reason: HOSPADM

## 2024-08-10 RX ORDER — MIDAZOLAM HYDROCHLORIDE 1 MG/ML
2 INJECTION INTRAMUSCULAR; INTRAVENOUS
Status: DISCONTINUED | OUTPATIENT
Start: 2024-08-10 | End: 2024-08-22

## 2024-08-10 RX ORDER — LACTULOSE 10 G/15ML
20 SOLUTION ORAL 3 TIMES DAILY
Status: DISCONTINUED | OUTPATIENT
Start: 2024-08-10 | End: 2024-08-12

## 2024-08-10 RX ORDER — POTASSIUM CHLORIDE 1500 MG/1
40 TABLET, EXTENDED RELEASE ORAL PRN
Status: DISCONTINUED | OUTPATIENT
Start: 2024-08-10 | End: 2024-08-11

## 2024-08-10 RX ORDER — POLYETHYLENE GLYCOL 3350 17 G/17G
17 POWDER, FOR SOLUTION ORAL DAILY PRN
Status: DISCONTINUED | OUTPATIENT
Start: 2024-08-10 | End: 2024-08-22 | Stop reason: HOSPADM

## 2024-08-10 RX ORDER — ETOMIDATE 2 MG/ML
20 INJECTION INTRAVENOUS ONCE
Status: COMPLETED | OUTPATIENT
Start: 2024-08-10 | End: 2024-08-10

## 2024-08-10 RX ORDER — ALBUTEROL SULFATE 0.83 MG/ML
2.5 SOLUTION RESPIRATORY (INHALATION) EVERY 6 HOURS PRN
Status: DISCONTINUED | OUTPATIENT
Start: 2024-08-10 | End: 2024-08-22 | Stop reason: HOSPADM

## 2024-08-10 RX ORDER — LORATADINE 10 MG/1
10 TABLET ORAL DAILY PRN
Status: ON HOLD | COMMUNITY
Start: 2023-05-17 | End: 2024-08-22 | Stop reason: HOSPADM

## 2024-08-10 RX ORDER — FLUTICASONE PROPIONATE 50 MCG
2 SPRAY, SUSPENSION (ML) NASAL DAILY
Status: ON HOLD | COMMUNITY
Start: 2023-05-17 | End: 2024-08-10

## 2024-08-10 RX ORDER — NALOXONE HYDROCHLORIDE 0.4 MG/ML
0.4 INJECTION, SOLUTION INTRAMUSCULAR; INTRAVENOUS; SUBCUTANEOUS PRN
Status: CANCELLED | OUTPATIENT
Start: 2024-08-10

## 2024-08-10 RX ORDER — FENTANYL CITRATE 0.05 MG/ML
25 INJECTION, SOLUTION INTRAMUSCULAR; INTRAVENOUS
Status: DISCONTINUED | OUTPATIENT
Start: 2024-08-10 | End: 2024-08-22

## 2024-08-10 RX ORDER — NOREPINEPHRINE BITARTRATE 0.06 MG/ML
1-100 INJECTION, SOLUTION INTRAVENOUS CONTINUOUS
Status: DISCONTINUED | OUTPATIENT
Start: 2024-08-10 | End: 2024-08-21

## 2024-08-10 RX ORDER — SODIUM CHLORIDE 0.9 % (FLUSH) 0.9 %
10 SYRINGE (ML) INJECTION PRN
Status: DISCONTINUED | OUTPATIENT
Start: 2024-08-10 | End: 2024-08-22 | Stop reason: HOSPADM

## 2024-08-10 RX ORDER — DEXTROSE MONOHYDRATE AND SODIUM CHLORIDE 5; .45 G/100ML; G/100ML
INJECTION, SOLUTION INTRAVENOUS CONTINUOUS
Status: DISCONTINUED | OUTPATIENT
Start: 2024-08-10 | End: 2024-08-12

## 2024-08-10 RX ORDER — BUDESONIDE 0.5 MG/2ML
0.5 INHALANT ORAL
Status: DISCONTINUED | OUTPATIENT
Start: 2024-08-10 | End: 2024-08-21

## 2024-08-10 RX ORDER — LANOLIN ALCOHOL/MO/W.PET/CERES
6 CREAM (GRAM) TOPICAL NIGHTLY PRN
Status: ON HOLD | COMMUNITY
Start: 2023-05-16 | End: 2024-08-10

## 2024-08-10 RX ORDER — SODIUM CHLORIDE 0.9 % (FLUSH) 0.9 %
5-40 SYRINGE (ML) INJECTION EVERY 12 HOURS SCHEDULED
Status: DISCONTINUED | OUTPATIENT
Start: 2024-08-10 | End: 2024-08-22 | Stop reason: HOSPADM

## 2024-08-10 RX ORDER — SODIUM CHLORIDE 9 MG/ML
INJECTION, SOLUTION INTRAVENOUS PRN
Status: DISCONTINUED | OUTPATIENT
Start: 2024-08-10 | End: 2024-08-22 | Stop reason: HOSPADM

## 2024-08-10 RX ORDER — ENOXAPARIN SODIUM 100 MG/ML
40 INJECTION SUBCUTANEOUS DAILY
Status: DISCONTINUED | OUTPATIENT
Start: 2024-08-10 | End: 2024-08-10

## 2024-08-10 RX ADMIN — PHENYLEPHRINE HYDROCHLORIDE 50 MCG/MIN: 50 INJECTION INTRAVENOUS at 05:07

## 2024-08-10 RX ADMIN — BUDESONIDE 500 MCG: 0.5 SUSPENSION RESPIRATORY (INHALATION) at 19:40

## 2024-08-10 RX ADMIN — SODIUM PHOSPHATE, MONOBASIC, MONOHYDRATE AND SODIUM PHOSPHATE, DIBASIC, ANHYDROUS 15 MMOL: 142; 276 INJECTION, SOLUTION INTRAVENOUS at 14:34

## 2024-08-10 RX ADMIN — NALOXONE HYDROCHLORIDE 0.4 MG: 0.4 INJECTION, SOLUTION INTRAMUSCULAR; INTRAVENOUS; SUBCUTANEOUS at 01:15

## 2024-08-10 RX ADMIN — BUDESONIDE 500 MCG: 0.5 SUSPENSION RESPIRATORY (INHALATION) at 07:03

## 2024-08-10 RX ADMIN — SODIUM CHLORIDE: 9 INJECTION, SOLUTION INTRAVENOUS at 14:25

## 2024-08-10 RX ADMIN — SODIUM CHLORIDE, PRESERVATIVE FREE 10 ML: 5 INJECTION INTRAVENOUS at 20:07

## 2024-08-10 RX ADMIN — MIRTAZAPINE 15 MG: 15 TABLET, FILM COATED ORAL at 20:07

## 2024-08-10 RX ADMIN — SODIUM CHLORIDE, PRESERVATIVE FREE 10 ML: 5 INJECTION INTRAVENOUS at 08:59

## 2024-08-10 RX ADMIN — AZITHROMYCIN MONOHYDRATE 500 MG: 500 INJECTION, POWDER, LYOPHILIZED, FOR SOLUTION INTRAVENOUS at 05:06

## 2024-08-10 RX ADMIN — DEXTROSE AND SODIUM CHLORIDE: 5; 450 INJECTION, SOLUTION INTRAVENOUS at 13:25

## 2024-08-10 RX ADMIN — RIFAXIMIN 550 MG: 550 TABLET ORAL at 18:47

## 2024-08-10 RX ADMIN — FENTANYL CITRATE 25 MCG: 0.05 INJECTION, SOLUTION INTRAMUSCULAR; INTRAVENOUS at 11:21

## 2024-08-10 RX ADMIN — Medication 5 MG/HR: at 20:56

## 2024-08-10 RX ADMIN — FENTANYL CITRATE 25 MCG: 0.05 INJECTION, SOLUTION INTRAMUSCULAR; INTRAVENOUS at 02:26

## 2024-08-10 RX ADMIN — IPRATROPIUM BROMIDE AND ALBUTEROL SULFATE 1 DOSE: 2.5; .5 SOLUTION RESPIRATORY (INHALATION) at 14:58

## 2024-08-10 RX ADMIN — Medication 100 MG: at 02:06

## 2024-08-10 RX ADMIN — FENTANYL CITRATE 25 MCG: 0.05 INJECTION, SOLUTION INTRAMUSCULAR; INTRAVENOUS at 18:00

## 2024-08-10 RX ADMIN — CETIRIZINE HYDROCHLORIDE 10 MG: 10 TABLET, FILM COATED ORAL at 18:48

## 2024-08-10 RX ADMIN — WATER 60 MG: 1 INJECTION INTRAMUSCULAR; INTRAVENOUS; SUBCUTANEOUS at 18:02

## 2024-08-10 RX ADMIN — WATER 60 MG: 1 INJECTION INTRAMUSCULAR; INTRAVENOUS; SUBCUTANEOUS at 09:44

## 2024-08-10 RX ADMIN — IPRATROPIUM BROMIDE AND ALBUTEROL SULFATE 1 DOSE: 2.5; .5 SOLUTION RESPIRATORY (INHALATION) at 11:03

## 2024-08-10 RX ADMIN — FLUTICASONE PROPIONATE 2 SPRAY: 50 SPRAY, METERED NASAL at 10:49

## 2024-08-10 RX ADMIN — SODIUM CHLORIDE: 9 INJECTION, SOLUTION INTRAVENOUS at 09:02

## 2024-08-10 RX ADMIN — ETOMIDATE INJECTION 20 MG: 2 SOLUTION INTRAVENOUS at 02:05

## 2024-08-10 RX ADMIN — WATER 1000 MG: 1 INJECTION INTRAMUSCULAR; INTRAVENOUS; SUBCUTANEOUS at 04:56

## 2024-08-10 RX ADMIN — IPRATROPIUM BROMIDE AND ALBUTEROL SULFATE 1 DOSE: 2.5; .5 SOLUTION RESPIRATORY (INHALATION) at 07:03

## 2024-08-10 RX ADMIN — WATER 60 MG: 1 INJECTION INTRAMUSCULAR; INTRAVENOUS; SUBCUTANEOUS at 04:54

## 2024-08-10 RX ADMIN — SODIUM CHLORIDE 500 ML: 9 INJECTION, SOLUTION INTRAVENOUS at 09:09

## 2024-08-10 RX ADMIN — SODIUM CHLORIDE, PRESERVATIVE FREE 40 MG: 5 INJECTION INTRAVENOUS at 20:03

## 2024-08-10 RX ADMIN — SODIUM CHLORIDE: 9 INJECTION, SOLUTION INTRAVENOUS at 05:05

## 2024-08-10 RX ADMIN — IPRATROPIUM BROMIDE AND ALBUTEROL SULFATE 1 DOSE: 2.5; .5 SOLUTION RESPIRATORY (INHALATION) at 19:40

## 2024-08-10 RX ADMIN — SODIUM CHLORIDE 500 ML: 9 INJECTION, SOLUTION INTRAVENOUS at 05:04

## 2024-08-10 RX ADMIN — Medication 2 MG/HR: at 02:10

## 2024-08-10 RX ADMIN — SODIUM CHLORIDE 500 ML: 9 INJECTION, SOLUTION INTRAVENOUS at 11:22

## 2024-08-10 RX ADMIN — LACTULOSE 20 G: 20 SOLUTION ORAL at 18:47

## 2024-08-10 RX ADMIN — FENTANYL CITRATE 25 MCG: 0.05 INJECTION, SOLUTION INTRAMUSCULAR; INTRAVENOUS at 09:25

## 2024-08-10 RX ADMIN — PHENYLEPHRINE HYDROCHLORIDE 149 MCG/MIN: 50 INJECTION INTRAVENOUS at 12:52

## 2024-08-10 RX ADMIN — PHENYLEPHRINE HYDROCHLORIDE 143 MCG/MIN: 50 INJECTION INTRAVENOUS at 20:57

## 2024-08-10 RX ADMIN — PANTOPRAZOLE SODIUM 40 MG: 40 INJECTION, POWDER, FOR SOLUTION INTRAVENOUS at 09:20

## 2024-08-10 RX ADMIN — Medication 5 MCG/MIN: at 10:37

## 2024-08-10 ASSESSMENT — PULMONARY FUNCTION TESTS
PIF_VALUE: 32
PIF_VALUE: 20
PIF_VALUE: 21
PIF_VALUE: 21
PIF_VALUE: 29
PIF_VALUE: 21
PIF_VALUE: 20
PIF_VALUE: 21

## 2024-08-10 ASSESSMENT — PAIN SCALES - GENERAL
PAINLEVEL_OUTOF10: 0
PAINLEVEL_OUTOF10: 7
PAINLEVEL_OUTOF10: 8
PAINLEVEL_OUTOF10: 5

## 2024-08-10 NOTE — H&P
History & Physical  [facility]    Adult Hospitalist      Name: Aiden Black  MRN: [id number]     Acct: [id number]  Room: [address] [place] [address]    Admit Date: 8/9/2024  6:45 PM  PCP: Angelica Cruz MD    Primary Problem  Principal Problem:    COPD exacerbation (HCC)  Active Problems:    Hypoxia  Resolved Problems:    * No resolved hospital problems. *        Assesment/ plan:      Patient is admitted to ICU        Acute COPD exacerbation   IV Solu-Medrol  Duo nebs   Respiratory pathogen panel  Sputum culture   Empiric IV ceftriaxone and Zithromax      Acute hypoxic and hypercarbic respiratory failure   Secondary to above   Mechanical ventilation sedation as per Critical Care    Critical care consult      Hypotension   Monitor blood pressure   Currently on pressors   IV fluid  Lactate and procalcitonin  Blood culture  Sputum culture        Cirrhosis  Patient has had history of esophageal varices  Monitor LFTs  Check ammonia  Currently on lactulose and Xifaxan   GI consult          Continue to monitor/telemetry/CBC with differential daily/BMP daily  DVT and GI prophylaxis.  Continue medications as below      Scheduled Meds:   sodium chloride flush  5-40 mL IntraVENous 2 times per day    methylPREDNISolone  60 mg IntraVENous Q8H    ipratropium 0.5 mg-albuterol 2.5 mg  1 Dose Inhalation Q4H WA RT    budesonide  0.5 mg Nebulization BID RT    cefTRIAXone (ROCEPHIN) IV  1,000 mg IntraVENous Q24H    azithromycin  500 mg IntraVENous Q24H    fluticasone  2 spray Nasal Daily    lactulose  20 g Oral TID    mirtazapine  15 mg Oral Nightly    rifAXIMin  550 mg Oral BID    cetirizine  10 mg Oral Daily    pantoprazole (PROTONIX) 40 mg in sodium chloride (PF) 0.9 % 10 mL injection  40 mg IntraVENous Q12H     Continuous Infusions:   sodium chloride 20 mL/hr at 08/10/24 1345    midazolam 5 mg/hr (08/10/24 1345)    phenylephrine (REMI-SYNEPHRINE) 50 mg in sodium chloride 0.9 % 250 mL infusion 147 mcg/min (08/10/24 1345)     extraocular eye movements intact, conjunctiva clear  Ears - hearing appears to be intact  Nose - no drainage noted  Mouth - mucous membranes moist  Neck - supple, no carotid bruits, thyroid not palpable  Chest -  bilateral air  entry  Heart - normal rate, regular rhythm, no murmur  Abdomen - soft, nontender, nondistended, bowel sounds present all four quadrants, no masses, hepatomegaly or splenomegaly  Neurological -  unable to assess  Extremities - peripheral pulses palpable, no pedal edema or calf pain with palpation  Skin - no gross lesions, rashes, or induration noted        Data:     Labs:    Hematology:  Recent Labs     08/09/24  1900 08/10/24  0043 08/10/24  1223   WBC 6.9 4.6 9.2   RBC 4.19* 4.48 4.12*   HGB 14.8 15.7 14.6   HCT 42.6 47.3 42.2   .5* 105.6* 102.4   MCH 35.3* 35.0* 35.4*   MCHC 34.8 33.2 34.6   RDW 14.1 13.5 13.4   PLT 79* 72* 101*   MPV 8.3 10.4 10.6   INR  --   --  1.4     Chemistry:  Recent Labs     08/09/24  1900 08/10/24  0043 08/10/24  1223    142 142   K 4.9 5.2 4.0    102 105   CO2 31 33* 22   GLUCOSE 104* 177* 114*   BUN 16 18 23   CREATININE 1.0 1.1 0.9   MG  --   --  1.7   ANIONGAP 9 7* 15   LABGLOM 78 70 89   CALCIUM 9.4 9.6 9.0   PHOS  --   --  0.9*   PROBNP 160  --   --    TROPHS 11 6 10   MYOGLOBIN  --  25* 47     Recent Labs     08/09/24  1900 08/10/24  0020 08/10/24  0043 08/10/24  1223   AST 38  --  27 25   ALT 19  --  26 20   ALKPHOS 122  --  115 73   BILITOT 1.3*  --  1.5* 2.8*   BILIDIR  --   --   --  0.7*   AMMONIA  --   --   --  67*   POCGLU  --  162*  --   --        Lab Results   Component Value Date    INR 1.4 08/10/2024    INR 1.2 (H) 02/22/2024    PROTIME 16.8 (H) 08/10/2024    PROTIME 12.1 (H) 02/22/2024       No results found for: \"SPECIAL\"  No results found for: \"CULTURE\"    Lab Results   Component Value Date/Time    POCPH 7.397 08/10/2024 06:05 AM    POCPCO2 44.8 08/10/2024 06:05 AM    POCPO2 153.8 08/10/2024 06:05 AM    POCHCO3 27.5 08/10/2024

## 2024-08-10 NOTE — PROGRESS NOTES
Pt brought to icu room 1104 from pcu. No report called to unit, writer given report by pcu rn at bedside while prepping to intubate. Dr. Delaney and RT at bedside to intubate emergently.  20 Etomidate given at 0205, 100 succinylcholine at 0206 , pt intubated at 0206 et tube 24 at the lip     Pt taken to stat head ct, see result. Pt in room with versed and sammy synephrine infusing

## 2024-08-10 NOTE — PLAN OF CARE
Problem: Discharge Planning  Goal: Discharge to home or other facility with appropriate resources  Outcome: Progressing  Flowsheets (Taken 8/10/2024 0800)  Discharge to home or other facility with appropriate resources: Identify barriers to discharge with patient and caregiver     Problem: Safety - Medical Restraint  Goal: Remains free of injury from restraints (Restraint for Interference with Medical Device)  Description: INTERVENTIONS:  1. Determine that other, less restrictive measures have been tried or would not be effective before applying the restraint  2. Evaluate the patient's condition at the time of restraint application  3. Inform patient/family regarding the reason for restraint  4. Q2H: Monitor safety, psychosocial status, comfort, nutrition and hydration  Outcome: Progressing  Flowsheets  Taken 8/10/2024 0800 by Patsy Miranda RN  Remains free of injury from restraints (restraint for interference with medical device): Determine that other, less restrictive measures have been tried or would not be effective before applying the restraint  Taken 8/10/2024 0228 by Mercedes Calderon RN  Remains free of injury from restraints (restraint for interference with medical device):   Determine that other, less restrictive measures have been tried or would not be effective before applying the restraint   Evaluate the patient's condition at the time of restraint application   Every 2 hours: Monitor safety, psychosocial status, comfort, nutrition and hydration   Inform patient/family regarding the reason for restraint     Problem: Safety - Adult  Goal: Free from fall injury  Outcome: Progressing     Problem: Respiratory - Adult  Goal: Achieves optimal ventilation and oxygenation  8/10/2024 1605 by Patsy Miranda RN  Outcome: Progressing  Flowsheets (Taken 8/10/2024 0800)  Achieves optimal ventilation and oxygenation: Assess for changes in respiratory status  8/10/2024 0703 by Marti Mccollum RCP  Outcome:  [address]      PATIENT'S NAME: Justen Soares   REFERRING PHYSICIAN:    PATIENT ACCOUNT #: [de-identified] LOCATION: [facility]   MEDICAL RECORD #: [id number] YOB: 1973   DATE OF Derek Alvarado comparable to the opposite side. EMG of the right upper extremity and lumbosacral paraspinal muscles are normal, and there is no evidence of root lesion; however, the possibility of a root lesion cannot be entirely ruled out by these tests.   If clinically

## 2024-08-10 NOTE — PLAN OF CARE
Problem: Respiratory - Adult  Goal: Achieves optimal ventilation and oxygenation  Outcome: Progressing     Problem: Respiratory - Adult  Goal: Able to breathe comfortably  Outcome: Progressing     Problem: Respiratory - Adult  Goal: Patient's breath sounds will be clear and equal  Outcome: Progressing     Problem: Respiratory - Adult  Goal: Adequate oxygenation  Description: Adequate oxygenation  Outcome: Progressing     Problem: Respiratory - Adult  Goal: Will be able to breathe spontaneously, without ventilator support  Description: Will be able to breathe spontaneously, without ventilator support  Outcome: Progressing           BRONCHOSPASM/BRONCHOCONSTRICTION    IMPROVE  AERATION/BREATHSOUNDS  ADMINISTER BRONCHODILATOR THERAPY AS APPROPRIATE  ASSESS BREATH SOUNDS  INITIATE AEROSOL PROTOCOL IF ORDERED TO DO SO  PATIENT EDUCATION AS NEEDED      PROVIDE ADEQUATE OXYGENATION WITH ACCEPTABLE SP02/ABG'S    [x]  IDENTIFY APPROPRIATE OXYGEN THERAPY  [x]   MONITOR SP02/ABG'S AS NEEDED   [x]   PATIENT EDUCATION AS NEEDED      MECHANICAL VENTILATION     [x]  PROVIDE OPTIMAL VENTILATION  [x]   ASSESS FOR EXTUBATION READINESS  [x]   ASSESS FOR WEANING READINESS  [x]  EXTUBATE AS TOLERATED  [x]  IMPLEMENT ADULT MECHANICAL VENTILATION PROTOCOL  [x]  MAINTAIN ADEQUATE OXYGENATION  [x]  PERFORM SPONTANEOUS WEANING TRIAL AS TOLERATED

## 2024-08-10 NOTE — CONSULTS
Pulmonary Medicine and Critical Care Consult    Patient - Aiden Black   MRN -  [id number]   [id number]   - 1950      Date of Admission -  2024  6:45 PM  Date of evaluation -  8/10/2024  Room - [address] [place] [address]   Hospital Day - 0  Consulting - Jake Armenta MD Primary Care Physician - Angelica Cruz MD     Reason for Consult      Respiratory failure/COPD extubation  Assessment/recommendations   Acute hypoxic and hypercarbic respiratory failure requiring intubation  Continue assist-control ventilation FiO2 30% PEEP of 8 respiratory 20 tidal volume 550    Sedation with Versed RASS -2  Fentanyl 25 every hour as needed for pain    COPD exacerbation  DuoNeb by nebulizer  Pulmicort 0.5 twice daily by nebulizer  Rocephin Zithromax  Solu-Medrol 60 IV every 8 hours  Respiratory pathogen panel    Hypotension requiring pressors   Wean off Lamont-Synephrine to MAP above 65  Check hemoglobin hematocrit now  Blood cultures x 2 lactate procalcitonin  IV fluid    liver cirrhosis history esophageal varices/thrombocytopenia  GI evaluation for possible EGD patient had previously been declared as risky for EGD by [facility] GI given variceal varices would not insert NG at this point without GI clearance  Check liver function test ammonia  Monitor platelet count/discontinue Lovenox SQ  Lactulose and Xifaxan once NG inserted    CO2 narcosis/possible encephalopathy   Check ammonia level  Neurology consultation    Suspected sleep apnea   Outpatient sleep evaluation    discussed with wife and sons at bedside advised to address CODE STATUS patient did not want prolonged ventilation  Discussed with RN and RT  Peptic ulcer disease prophylaxis with Protonix 40 IV twice daily  DVT prophylaxis EPC    Problem List      Patient Active Problem List   Diagnosis    COPD exacerbation (HCC)    Hypoxia    Carpal tunnel syndrome    Depression    Diverticulitis    Diverticulosis of large intestine    Fatigue    Gastric  Violence: Unknown (2/23/2024)    Received from [facility]     Fear of Current or Ex-Partner: Not on file     Emotionally Abused: Not on file     Physically Abused: Not on file     Sexually Abused: Not on file     Physically or Sexually Abused: Not on file   Housing Stability: Low Risk  (8/10/2024)    Housing Stability Vital Sign     Unable to Pay for Housing in the Last Year: No     Number of Times Moved in the Last Year: 0     Homeless in the Last Year: No     Family History    History reviewed. No pertinent family history.  ROS - 11 systems   Intubated sedated unresponsive  Vitals     height is 1.803 m (5' 11\") and weight is 90.7 kg (200 lb). His axillary temperature is 97.8 °F (36.6 °C). His blood pressure is 117/72 and his pulse is 57. His respiration is 21 and oxygen saturation is 99%.   Body mass index is 27.89 kg/m².  I/O      Intake/Output Summary (Last 24 hours) at 8/10/2024 1239  Last data filed at 8/10/2024 1138  Gross per 24 hour   Intake 1159.53 ml   Output 375 ml   Net 784.53 ml     I/O last 3 completed shifts:  In: 399.5 [I.V.:46.3; IV Piggyback:353.2]  Out: 175 [Urine:175]   Patient Vitals for the past 96 hrs (Last 3 readings):   Weight   08/10/24 0234 90.7 kg (200 lb)   08/09/24 1851 90.7 kg (200 lb)     Exam   General Appearance intubated sedated unresponsive  HEENT - Head is normocephalic, atraumatic. Pupil reactive to light  Neck - Supple, symmetrical, trachea midline and Soft, trachea midline and straight  Lungs -decreased breath sound no crackles or wheezing  Cardiovascular - Heart sounds are normal.  Regular rhythm normal rate without murmur, gallop or rub.  Abdomen - Soft, nontender, nondistended, no masses or organomegaly  Neurologic -intubated sedated unresponsive  Skin - No bruising or bleeding  Extremities - No cyanosis, clubbing or edema    Labs  - Old records and notes have been reviewed in [facility]   CBC     Lab Results   Component Value

## 2024-08-10 NOTE — PROGRESS NOTES
Insert Arterial Line  Date/Time:  08/10/24, 4:47 AM  Performed by: JUAN HARRISON JR, RCP    Patient identity confirmed: arm band and provided demographic data   Time out: Immediately prior to procedure a \"time out\" was called to verify the correct patient, procedure, equipment, support staff.    Preparation: Patient was prepped and draped in the usual sterile fashion.    Location:right radial    Trae's test normal: yes  Needle gauge: 20     Number of attempts: 1  Post-procedure: transparent dressing applied and line secured    Patient tolerance: well

## 2024-08-10 NOTE — SIGNIFICANT EVENT
Accepted patient from [facility] ED for COPD exacerbation and hypoxia in stable condition. Upon arrival nurse states patient is extremely lethargic. Transferring to ICU. Labs ordered, including ABGS. Critical care has been pages

## 2024-08-10 NOTE — PROGRESS NOTES
Pt arrived via EMS transport and presented as very lethargic. He is responsive to sternal rub only opening eyes for brief moment. Writer concerned as pt admitted for COPD exacerbation on 2L NC. RT called to bedside. Writer spoke with pulm, verbal orders given and written. Narcan given, pt remains lethargic. ABGs completed. Resulted as follows:   Latest Reference Range & Units 08/10/24 00:11   POC HCO3 21.0 - 28.0 mmol/L 41.3 (HH)   POC O2 SAT 94.0 - 98.0 % 97.7   POC pCO2 35.0 - 48.0 mm Hg 119.3 (HH)   POC pH 7.350 - 7.450  7.147 (LL)   POC PO2 83.0 - 108.0 mm Hg 138.6 (H)   (HH): Data is critically high  (LL): Data is critically low  (H): Data is abnormally high  Pt now on continuous bipap. To be intubated upon arrival to ICU. Orders in for transfer. House supervisor at bedside, advised to give report at bedside after transfer. Dr. Bañuelos stated he would call ICU directly with any needed drips after intubation.

## 2024-08-10 NOTE — PROGRESS NOTES
End Of Shift Note  [facility] ICU  Summary of shift: Patient sedated on vent. Afebrile. Patient has had low urine output today and increased need for pressors. Did get x2 500 ml boluses and started d5.45ns at 75 ml/hr. Levo started at starting rate per crit care and pt shortly got hypertensive into 180s and bradycardic in low 50s, levo stopped, continuing to titrate remi for MAP of 65. Patient started on PPI, GI on, Ok to insert OG/NG per GI, no s/s of bleeding throughout day or with OG insertion, XR done to verify placement due to positive air bolus but no gastric contents. Will advance per XR result. Phos replaced today.     Vitals:    Vitals:    08/10/24 1730 08/10/24 1745 08/10/24 1800 08/10/24 1815   BP:       Pulse: 60 58 64 59   Resp: 20 20 20 20   Temp:       TempSrc:       SpO2: 100% 100% 100% 100%   Weight:       Height:            I&O:   Intake/Output Summary (Last 24 hours) at 8/10/2024 1827  Last data filed at 8/10/2024 1801  Gross per 24 hour   Intake 2348.78 ml   Output 525 ml   Net 1823.78 ml       Resp Status: Vent    Ventilator Settings:  Vent Mode: AC/PRVC Resp Rate (Set): 20 bpm/Vt (Set, mL): 550 mL/ /FiO2 : 30 %    Critical Care IV infusions:   sodium chloride Stopped (08/10/24 1434)    midazolam 5 mg/hr (08/10/24 1728)    phenylephrine (REMI-SYNEPHRINE) 50 mg in sodium chloride 0.9 % 250 mL infusion 143 mcg/min (08/10/24 1728)    norepinephrine Stopped (08/10/24 1045)    dextrose 5 % and 0.45 % NaCl 75 mL/hr at 08/10/24 1728        LDA:   PICC 08/10/24 Left Brachial (Active)   Number of days: 0       Peripheral IV 08/09/24 Right Forearm (Active)   Number of days: 0       Peripheral IV 08/10/24 Distal;Left;Anterior Forearm (Active)   Number of days: 0       Urinary Catheter 08/10/24 Roca (Active)   Number of days: 0       ETT  (Active)   Number of days: 0       Arterial Line 08/10/24 Right Radial (Active)   Number of days: 0

## 2024-08-10 NOTE — ED PROVIDER NOTES
FACULTY SIGN-OUT  ADDENDUM     Care of this patient was assumed from Dr. Kitchen.  The patient was seen for Shortness of Breath  .  The patient's initial evaluation and plan have been discussed with the prior provider who initially evaluated the patient.  Nursing Notes, Past Medical Hx, Past Surgical Hx, Social Hx, Allergies, and Family Hx were all reviewed.        CHIEF COMPLAINT       Chief Complaint   Patient presents with    Shortness of Breath         PAST MEDICAL HISTORY    has a past medical history of Arthritis, Cirrhosis (HCC), Emphysema lung (HCC), GI bleed, Hypertension, and MI (myocardial infarction) (HCC).    SURGICAL HISTORY      has no past surgical history on file.    CURRENT MEDICATIONS       Current Discharge Medication List        CONTINUE these medications which have NOT CHANGED    Details   famotidine (PEPCID) 40 MG tablet Take 1 tablet by mouth as needed      azithromycin (ZITHROMAX Z-FABY) 250 MG tablet Follow directions on package labelling  Qty: 1 packet, Refills: 0      rifAXIMin (XIFAXAN) 550 MG tablet Take 1 tablet by mouth in the morning and 1 tablet in the evening.      spironolactone (ALDACTONE) 25 MG tablet Take 1 tablet by mouth      furosemide (LASIX) 40 MG tablet Take 1 tablet by mouth      mirtazapine (REMERON) 15 MG tablet Take 1 tablet by mouth nightly      carvedilol (COREG) 3.125 MG tablet Take 1 tablet by mouth 2 times daily (with meals)             ALLERGIES     has No Known Allergies.    FAMILY HISTORY     has no family status information on file.      family history is not on file.    SOCIAL HISTORY      reports that he has quit smoking. His smoking use included cigarettes. He has never used smokeless tobacco. He reports that he does not currently use alcohol. He reports that he does not use drugs.      DIAGNOSTIC RESULTS     EKG: All EKG's are interpreted by the Emergency Department Physician who either signs or Co-signs this chart in the absence of a

## 2024-08-10 NOTE — PROGRESS NOTES
Transitions of Care Pharmacy Service   Medication Review    The patient's list of current home medications has been reviewed.     Source(s) of information: Patient's spouse; Surescripts    Based on information provided by the above source(s), I have updated the patient's home med list as described below.     Please review the ACTION REQUESTED section of this note below for any discrepancies on current hospital orders.      I changed or updated the following medications on the patient's home medication list:  Removed Tylenol  Zithromax  Flonase  Melatonin       Added None     Adjusted   Albuterol nebs  Famotidine  Furosemide  Loratadine  Spironolactone   Other Notes None         PROVIDER ACTION REQUESTED  Medications that need to be addressed by a physician/nurse practitioner:    Medication Action Requested        Home med list has been reviewed and updated for provider reconciliation.    Flonase:  not a current home med; consider discontinuing as appropriate.         Please feel free to call me with any questions about this encounter. Thank you.    Jamee Cage RPH   Transitions of Care Pharmacy Service  Phone:  [phone]  Fax: [phone]      Electronically signed by Jamee Cage RP on 8/10/2024 at 1:21 PM         Medications Prior to Admission:   lactulose (CHRONULAC) 10 GM/15ML solution, Take 30 mLs by mouth 3 times daily  albuterol (PROVENTIL) (2.5 MG/3ML) 0.083% nebulizer solution, Inhale 3 mLs into the lungs 3 times daily  loratadine (CLARITIN) 10 MG tablet, Take 1 tablet by mouth daily as needed  famotidine (PEPCID) 40 MG tablet, Take 1 tablet by mouth every evening  rifAXIMin (XIFAXAN) 550 MG tablet, Take 1 tablet by mouth in the morning and 1 tablet in the evening.  spironolactone (ALDACTONE) 25 MG tablet, Take 1 tablet by mouth daily  furosemide (LASIX) 40 MG tablet, Take 1 tablet by mouth daily  mirtazapine (REMERON) 15 MG tablet, Take 1 tablet by mouth nightly  carvedilol (COREG) 3.125  MG tablet, Take 1 tablet by mouth 2 times daily (with meals)

## 2024-08-10 NOTE — PROGRESS NOTES
Picc placement note:  Dynamic Access RN procedure    Consent signed and obtained by proceduralist, from  patient's spouse. See consent form in paper chart.    Prescribed IV Therapy = Levophed, Lamont, Versed, rocephin, zithromax  Peripheral ultrasound assessment done. Plan for  left brachial vein insertion.   CVR measurement = 38 % (Linear CVR is preferred to be less than 45%).  Product type: Bard 5 fr TL lumen Power PICC.  History/Labs/Allergies Reviewed  Placed By: Mireya Sanchez RN, VA-BC (Dynamic Access)  Time out Performed using Two Identifiers  Lot # [id number]  Expiration date = 03/31/2025  Trimmed at 45 cm total  External catheter length 0 cm  Number of attempts 1  Special equipment used- Bard 3cg tip confirmation system, ultrasound, and micro-introducer (MST) technique   Catheter securement = adhesive 3M securement device  Dressing applied= Tegaderm CHG  Lidocaine administered intradermally conc.1%, approx 1 ml (Lidocaine Lot# - [id number] and Exp date - 03/31/2026 )    Patsy RN aware picc placed with ECG technology and is confirmed in the distal 1/3 SVC. Picc is immediately released for use. Rn aware new iv tubing required.      RN aware CXR needed prior to using picc. CXR ordered and awaiting report. Rn aware new iv tubing required.     PICC education:     [  ] Discussed with patient/Family or POA prior to procedure.  Risks and Benefits along with reason for procedure were discussed and teaching was reinforced with an education handout on line  insertion. [facility] FAQ Catheter Associated Blood Stream Infections and [facility] [id number] REV. 7/13 Nursing and Booklet left at bedside or in chart. Patient (Family or POA) acknowledged understanding of information taught and agreed to procedure.      [  ] Was not discussed with patient/family or POA due to pts medical status at time of procedure. pts family or POA not available to discuss line education. [facility] FAQ Catheter Associated Blood Stream Infections and [facility] [id number] REV. 7/13

## 2024-08-10 NOTE — PROGRESS NOTES
Physical Therapy  DATE: 8/10/2024    NAME: Aiden Black  MRN: [id number]   : 1950    Patient not seen this date for Physical Therapy due to:          [x] Pt intubated & sedated on vent    [] Critical Lab Value Level     [] Blood transfusion in progress    [] Acute or unstable cardiovascular status   _MAP < 55 or more than >115  _HR < 40 or > 130    [] Acute or unstable pulmonary status   -FiO2 > 60%   _RR < 5 or >40    _O2 sats < 85%    [] Strict Bedrest    [] Off Unit for surgery or procedure    [] Off Unit for testing       [] Pending imaging to R/O fracture    [] Refusal by Patient      [] Other      [] PT being discontinued at this time. Patient independent. No further needs.     [] PT being discontinued at this time as the patient has been transferred to hospice care. No further needs.      EVONNE LOZADA, PT

## 2024-08-10 NOTE — PLAN OF CARE
Problem: Respiratory - Adult  Goal: Achieves optimal ventilation and oxygenation  8/10/2024 0703 by Marti Mccollum RCP  Outcome: Progressing     Problem: Respiratory - Adult  Goal: Able to breathe comfortably  8/10/2024 0703 by Marti Mccollum RCP  Outcome: Progressing     Problem: Respiratory - Adult  Goal: Patient's breath sounds will be clear and equal  8/10/2024 0703 by Marti Mccollum RCP  Outcome: Progressing     Problem: Respiratory - Adult  Goal: Adequate oxygenation  Description: Adequate oxygenation  8/10/2024 0703 by Marti Mccollum RCP  Outcome: Progressing

## 2024-08-10 NOTE — PROGRESS NOTES
Spiritual Health Assessment/Progress Note  [facility]    (P) Initial Encounter,  ,  ,      Name: Aiden Black MRN: [id number]    Age: 73 y.o.     Sex: male   Language: English   Sikhism: Latter-day   COPD exacerbation (HCC)     Date: 8/10/2024            Total Time Calculated: (P) 8 min              Spiritual Assessment began in [facility] ICU        Referral/Consult From: (P) Rounding   Encounter Overview/Reason: (P) Initial Encounter  Service Provided For: (P) Family    Ana, Belief, Meaning:   Patient unable to communicate at this time  Family/Friends have beliefs or practices that help with coping during difficult times      Importance and Influence:  Patient unable to communicate at this time  Family/Friends have spiritual/personal beliefs that influence decisions regarding the patient's health    Community:  Patient feels well-supported. Support system includes: Children and Extended family  Family/Friends feel well-supported. Support system includes: Extended family    Assessment and Plan of Care:     Patient Interventions include: Other: unable to communicate. Pt was sleeping  Family/Friends Interventions include: Family/Friends Interventions    Patient Plan of Care: No spiritual needs identified for follow-up  Family/Friends Plan of Care: No spiritual needs identified for follow-up    Electronically signed by Chaplain Cornelius on 8/10/2024 at 11:22 AM

## 2024-08-10 NOTE — PROGRESS NOTES
Occupational Therapy    DATE: 8/10/2024    NAME: Aiden Black  MRN: [id number]   : 1950    Patient not seen this date for Occupational Therapy due to:      [] Cancel by RN or physician due to:    [] Hemodialysis    [] Critical Lab Value Level     [] Blood transfusion in progress    [] Acute or unstable cardiovascular status   _MAP < 55 or more than >115  _HR < 40 or > 130    [] Acute or unstable pulmonary status   -FiO2 > 60%   _RR < 5 or >40    _O2 sats < 85%    [] Strict Bedrest    [] Off Unit for surgery or procedure    [] Off Unit for testing       [] Pending imaging to R/O fracture    [] Refusal by Patient      [x] Intubated: Pt intubated & sedated on vent     [] Other     [] OT being discontinued at this time. Patient independent. No further needs.     [] OT being discontinued at this time as the patient has been transferred to hospice care. No further needs.      Barbara Nina OTR/L

## 2024-08-10 NOTE — PLAN OF CARE
Problem: Respiratory - Adult  Goal: Achieves optimal ventilation and oxygenation  8/10/2024 1938 by Mireya Sánchez RCP  Outcome: Progressing     Problem: Respiratory - Adult  Goal: Able to breathe comfortably  8/10/2024 1938 by Mireya Sánchez RCP  Outcome: Progressing     Problem: Respiratory - Adult  Goal: Patient's breath sounds will be clear and equal  8/10/2024 1938 by Mireya Sánchez RCP  Outcome: Progressing     Problem: Respiratory - Adult  Goal: Adequate oxygenation  Description: Adequate oxygenation  8/10/2024 1938 by Mireya Sánchez RCP  Outcome: Progressing     Problem: Respiratory - Adult  Goal: Will be able to breathe spontaneously, without ventilator support  Description: Will be able to breathe spontaneously, without ventilator support  Outcome: Progressing

## 2024-08-10 NOTE — SIGNIFICANT EVENT
Called to bedside by nursing.  Patient is minimally responsive only mumbles to deep sternal rub.  ABG was obtained showing CO2 120 with significant acidosis patient was temporarily on BiPAP however given altered mentation would need airway securement.  Suspect COPD.  Patient intubated in the manner described below.  Tolerated well.  Chest x-ray personally reviewed showing good tube placement.    PROCEDURE NOTE - EMERGENCY INTUBATION    PATIENT NAME: Aiden Black  MEDICAL RECORD NO. [id number]  DATE: 8/10/2024    PREOPERATIVE DIAGNOSIS:  Acute Respiratory Failure  POSTOPERATIVE DIAGNOSIS:  Same  PROCEDURE PERFORMED:  Emergency endotracheal intubation  PERFORMING PHYSICIAN: Prosper Delaney DO    MEDICATIONS: etomidate intravenously and succinycholine intravenously    DISCUSSION:  Aiden Black is a 73 y.o.-year-old male who requires intubation and ventilatory support due to Respiratory failure, comatose state, hypercarbia, and airway protection.  The history and physical examination were reviewed and confirmed.      CONSENT: Unable to be obtained due to patient's condition.     PROCEDURE:  A timeout was initiated by the bedside nurse and was confirmed by those present.  The patient was placed in the appropriate position.  Cricoid pressure was not required.  Intubation was performed with Glidescope a 7.5 cuffed endotracheal tube.  The cuff was then inflated and the tube was secured appropriately at a distance of 24 cm to the dental ridge.  Initial confirmation of placement included bilateral breath sounds, an end tidal CO2 detector, absence of sounds over the stomach, tube fogging, adequate chest rise, and adequate pulse oximetry reading.  A chest x-ray to verify correct placement of the tube showed appropriate tube position.    The patient tolerated the procedure well.     COMPLICATIONS:  None     Prosper Delaney DO  4:42 AM, 8/10/24

## 2024-08-10 NOTE — PROGRESS NOTES
End Of Shift Note  [place] [place] ICU  Summary of shift: pt transferred from pcu to icu room 1104. Pt into [facility] ed for sob, has been ongoing for 3 weeks, hx of emphysema. Pt arrived to pcu lethargic/unresponsive, pt placed on bipap... however upon discussion w/ crit care... pt transferred to icu and underwent intubation. Was not provided w/ detailed hx of pt. Pt was at [facility] a year prior d/t esophogeal varices and liver cirrhosis, was intubated there for short time. Dr. Delaney up to icu to intubate pt, went to head ct (negative), pt is awaiting consults. Pt is on pressor support (remi) and sedated w/ versed. Pressor titrated appropriately throughout shift. No bm (wife states pt takes 30mg of lactulose tid) and griffith inserted w/ minimal output. Pt remains vented/sedated and restraint charting completed.     Vitals:    Vitals:    08/10/24 0530 08/10/24 0545 08/10/24 0600 08/10/24 0615   BP:   (!) 124/104    Pulse: 81 77 89 81   Resp: 20 20 20 20   Temp:       TempSrc:       SpO2: 100% 100% 99% 100%   Weight:       Height:            I&O:   Intake/Output Summary (Last 24 hours) at 8/10/2024 0632  Last data filed at 8/10/2024 0629  Gross per 24 hour   Intake 399.47 ml   Output 175 ml   Net 224.47 ml       Resp Status: vent     Ventilator Settings:  Vent Mode: AC/PRVC Resp Rate (Set): (S) 20 bpm/Vt (Set, mL): 550 mL/ /FiO2 : 30 %    Critical Care IV infusions:   sodium chloride 5 mL/hr at 08/10/24 0629    midazolam 5 mg/hr (08/10/24 0629)    phenylephrine (REMI-SYNEPHRINE) 50 mg in sodium chloride 0.9 % 250 mL infusion 80 mcg/min (08/10/24 0626)        LDA:   Peripheral IV 08/09/24 Right Forearm (Active)   Number of days: 0       Peripheral IV 08/10/24 Distal;Right Forearm (Active)   Number of days: 0       Peripheral IV 08/10/24 Distal;Left;Anterior Forearm (Active)   Number of days: 0       Urinary Catheter 08/10/24 Griffith (Active)   Number of days: 0       ETT  (Active)   Number of days: 0       Arterial Line  08/10/24 Right Radial (Active)   Number of days: 0

## 2024-08-11 ENCOUNTER — APPOINTMENT (OUTPATIENT)
Dept: GENERAL RADIOLOGY | Age: 74
End: 2024-08-11
Payer: MEDICARE

## 2024-08-11 LAB
ALBUMIN SERPL-MCNC: 3.2 G/DL (ref 3.5–5.2)
ALLEN TEST: ABNORMAL
ALP SERPL-CCNC: 62 U/L (ref 40–129)
ALT SERPL-CCNC: 17 U/L (ref 5–41)
ANION GAP SERPL CALCULATED.3IONS-SCNC: 15 MMOL/L (ref 9–17)
AST SERPL-CCNC: 19 U/L
BASOPHILS # BLD: <0.03 K/UL (ref 0–0.2)
BASOPHILS NFR BLD: 0 % (ref 0–2)
BILIRUB SERPL-MCNC: 2.3 MG/DL (ref 0.3–1.2)
BUN SERPL-MCNC: 27 MG/DL (ref 8–23)
BUN/CREAT SERPL: 27 (ref 9–20)
CALCIUM SERPL-MCNC: 8.7 MG/DL (ref 8.6–10.4)
CHLORIDE SERPL-SCNC: 107 MMOL/L (ref 98–107)
CO2 SERPL-SCNC: 22 MMOL/L (ref 20–31)
CREAT SERPL-MCNC: 1 MG/DL (ref 0.7–1.2)
EOSINOPHIL # BLD: <0.03 K/UL (ref 0–0.44)
EOSINOPHILS RELATIVE PERCENT: 0 % (ref 1–4)
ERYTHROCYTE [DISTWIDTH] IN BLOOD BY AUTOMATED COUNT: 13.9 % (ref 11.8–14.4)
FIO2: 30
GFR, ESTIMATED: 78 ML/MIN/1.73M2
GLUCOSE SERPL-MCNC: 137 MG/DL (ref 70–99)
HCT VFR BLD AUTO: 39.7 % (ref 40.7–50.3)
HGB BLD-MCNC: 14 G/DL (ref 13–17)
IMM GRANULOCYTES # BLD AUTO: 0.09 K/UL (ref 0–0.3)
IMM GRANULOCYTES NFR BLD: 1 %
LYMPHOCYTES NFR BLD: 1.63 K/UL (ref 1.1–3.7)
LYMPHOCYTES RELATIVE PERCENT: 12 % (ref 24–43)
MAGNESIUM SERPL-MCNC: 1.7 MG/DL (ref 1.6–2.6)
MCH RBC QN AUTO: 35.6 PG (ref 25.2–33.5)
MCHC RBC AUTO-ENTMCNC: 35.3 G/DL (ref 28.4–34.8)
MCV RBC AUTO: 101 FL (ref 82.6–102.9)
MODE: ABNORMAL
MONOCYTES NFR BLD: 0.67 K/UL (ref 0.1–1.2)
MONOCYTES NFR BLD: 5 % (ref 3–12)
MRSA, DNA, NASAL: NEGATIVE
MYOGLOBIN SERPL-MCNC: 127 NG/ML (ref 28–72)
MYOGLOBIN SERPL-MCNC: 57 NG/ML (ref 28–72)
NEUTROPHILS NFR BLD: 82 % (ref 36–65)
NEUTS SEG NFR BLD: 11.31 K/UL (ref 1.5–8.1)
NRBC BLD-RTO: 0 PER 100 WBC
O2 DELIVERY DEVICE: ABNORMAL
PHOSPHATE SERPL-MCNC: 3.6 MG/DL (ref 2.5–4.5)
PLATELET # BLD AUTO: 91 K/UL (ref 138–453)
PMV BLD AUTO: 10.4 FL (ref 8.1–13.5)
POC HCO3: 23.3 MMOL/L (ref 21–28)
POC O2 SATURATION: 99.5 % (ref 94–98)
POC PCO2: 27.9 MM HG (ref 35–48)
POC PH: 7.53 (ref 7.35–7.45)
POC PO2: 142.2 MM HG (ref 83–108)
POSITIVE BASE EXCESS, ART: 1.6 MMOL/L (ref 0–3)
POTASSIUM SERPL-SCNC: 3.3 MMOL/L (ref 3.7–5.3)
POTASSIUM SERPL-SCNC: 3.5 MMOL/L (ref 3.7–5.3)
POTASSIUM SERPL-SCNC: 4.4 MMOL/L (ref 3.7–5.3)
PROT SERPL-MCNC: 5.8 G/DL (ref 6.4–8.3)
RBC # BLD AUTO: 3.93 M/UL (ref 4.21–5.77)
SAMPLE SITE: ABNORMAL
SODIUM SERPL-SCNC: 144 MMOL/L (ref 135–144)
SPECIMEN DESCRIPTION: NORMAL
TROPONIN I SERPL HS-MCNC: 11 NG/L (ref 0–22)
TROPONIN I SERPL HS-MCNC: 12 NG/L (ref 0–22)
TROPONIN I SERPL HS-MCNC: 14 NG/L (ref 0–22)
WBC OTHER # BLD: 13.7 K/UL (ref 3.5–11.3)

## 2024-08-11 PROCEDURE — 94761 N-INVAS EAR/PLS OXIMETRY MLT: CPT

## 2024-08-11 PROCEDURE — 2700000000 HC OXYGEN THERAPY PER DAY

## 2024-08-11 PROCEDURE — 5A1955Z RESPIRATORY VENTILATION, GREATER THAN 96 CONSECUTIVE HOURS: ICD-10-PCS | Performed by: FAMILY MEDICINE

## 2024-08-11 PROCEDURE — 71045 X-RAY EXAM CHEST 1 VIEW: CPT

## 2024-08-11 PROCEDURE — 83735 ASSAY OF MAGNESIUM: CPT

## 2024-08-11 PROCEDURE — 6360000002 HC RX W HCPCS: Performed by: INTERNAL MEDICINE

## 2024-08-11 PROCEDURE — 6360000002 HC RX W HCPCS: Performed by: NURSE PRACTITIONER

## 2024-08-11 PROCEDURE — 84100 ASSAY OF PHOSPHORUS: CPT

## 2024-08-11 PROCEDURE — 2000000000 HC ICU R&B

## 2024-08-11 PROCEDURE — 6370000000 HC RX 637 (ALT 250 FOR IP): Performed by: INTERNAL MEDICINE

## 2024-08-11 PROCEDURE — 80053 COMPREHEN METABOLIC PANEL: CPT

## 2024-08-11 PROCEDURE — 82803 BLOOD GASES ANY COMBINATION: CPT

## 2024-08-11 PROCEDURE — 2580000003 HC RX 258: Performed by: INTERNAL MEDICINE

## 2024-08-11 PROCEDURE — 83874 ASSAY OF MYOGLOBIN: CPT

## 2024-08-11 PROCEDURE — 94640 AIRWAY INHALATION TREATMENT: CPT

## 2024-08-11 PROCEDURE — 85025 COMPLETE CBC W/AUTO DIFF WBC: CPT

## 2024-08-11 PROCEDURE — 6370000000 HC RX 637 (ALT 250 FOR IP): Performed by: FAMILY MEDICINE

## 2024-08-11 PROCEDURE — 84484 ASSAY OF TROPONIN QUANT: CPT

## 2024-08-11 PROCEDURE — 94003 VENT MGMT INPAT SUBQ DAY: CPT

## 2024-08-11 PROCEDURE — 2580000003 HC RX 258: Performed by: NURSE PRACTITIONER

## 2024-08-11 PROCEDURE — 5A09357 ASSISTANCE WITH RESPIRATORY VENTILATION, LESS THAN 24 CONSECUTIVE HOURS, CONTINUOUS POSITIVE AIRWAY PRESSURE: ICD-10-PCS | Performed by: FAMILY MEDICINE

## 2024-08-11 PROCEDURE — 0DH67UZ INSERTION OF FEEDING DEVICE INTO STOMACH, VIA NATURAL OR ARTIFICIAL OPENING: ICD-10-PCS | Performed by: FAMILY MEDICINE

## 2024-08-11 PROCEDURE — 37799 UNLISTED PX VASCULAR SURGERY: CPT

## 2024-08-11 PROCEDURE — 84132 ASSAY OF SERUM POTASSIUM: CPT

## 2024-08-11 PROCEDURE — 2500000003 HC RX 250 WO HCPCS: Performed by: INTERNAL MEDICINE

## 2024-08-11 RX ORDER — IPRATROPIUM BROMIDE AND ALBUTEROL SULFATE 2.5; .5 MG/3ML; MG/3ML
1 SOLUTION RESPIRATORY (INHALATION)
Status: DISCONTINUED | OUTPATIENT
Start: 2024-08-11 | End: 2024-08-11

## 2024-08-11 RX ORDER — POTASSIUM CHLORIDE 7.45 MG/ML
10 INJECTION INTRAVENOUS PRN
Status: DISCONTINUED | OUTPATIENT
Start: 2024-08-11 | End: 2024-08-22 | Stop reason: HOSPADM

## 2024-08-11 RX ORDER — POTASSIUM CHLORIDE 29.8 MG/ML
20 INJECTION INTRAVENOUS PRN
Status: DISCONTINUED | OUTPATIENT
Start: 2024-08-11 | End: 2024-08-22 | Stop reason: HOSPADM

## 2024-08-11 RX ORDER — ARFORMOTEROL TARTRATE 15 UG/2ML
15 SOLUTION RESPIRATORY (INHALATION)
Status: DISCONTINUED | OUTPATIENT
Start: 2024-08-11 | End: 2024-08-21

## 2024-08-11 RX ORDER — IPRATROPIUM BROMIDE AND ALBUTEROL SULFATE 2.5; .5 MG/3ML; MG/3ML
1 SOLUTION RESPIRATORY (INHALATION)
Status: DISCONTINUED | OUTPATIENT
Start: 2024-08-11 | End: 2024-08-21

## 2024-08-11 RX ADMIN — LACTULOSE 20 G: 20 SOLUTION ORAL at 14:04

## 2024-08-11 RX ADMIN — ARFORMOTEROL TARTRATE 15 MCG: 15 SOLUTION RESPIRATORY (INHALATION) at 19:51

## 2024-08-11 RX ADMIN — RIFAXIMIN 550 MG: 550 TABLET ORAL at 21:52

## 2024-08-11 RX ADMIN — SODIUM CHLORIDE, PRESERVATIVE FREE 10 ML: 5 INJECTION INTRAVENOUS at 21:53

## 2024-08-11 RX ADMIN — DEXTROSE AND SODIUM CHLORIDE: 5; 450 INJECTION, SOLUTION INTRAVENOUS at 03:29

## 2024-08-11 RX ADMIN — POTASSIUM CHLORIDE 20 MEQ: 29.8 INJECTION, SOLUTION INTRAVENOUS at 06:44

## 2024-08-11 RX ADMIN — WATER 1000 MG: 1 INJECTION INTRAMUSCULAR; INTRAVENOUS; SUBCUTANEOUS at 03:56

## 2024-08-11 RX ADMIN — SODIUM PHOSPHATE, MONOBASIC, MONOHYDRATE AND SODIUM PHOSPHATE, DIBASIC, ANHYDROUS 15 MMOL: 142; 276 INJECTION, SOLUTION INTRAVENOUS at 02:04

## 2024-08-11 RX ADMIN — PHENYLEPHRINE HYDROCHLORIDE 139 MCG/MIN: 50 INJECTION INTRAVENOUS at 03:21

## 2024-08-11 RX ADMIN — WATER 60 MG: 1 INJECTION INTRAMUSCULAR; INTRAVENOUS; SUBCUTANEOUS at 12:35

## 2024-08-11 RX ADMIN — RIFAXIMIN 550 MG: 550 TABLET ORAL at 08:47

## 2024-08-11 RX ADMIN — Medication 5 MG/HR: at 17:27

## 2024-08-11 RX ADMIN — LACTULOSE 20 G: 20 SOLUTION ORAL at 08:47

## 2024-08-11 RX ADMIN — SODIUM CHLORIDE, PRESERVATIVE FREE 40 MG: 5 INJECTION INTRAVENOUS at 08:47

## 2024-08-11 RX ADMIN — MIRTAZAPINE 15 MG: 15 TABLET, FILM COATED ORAL at 21:52

## 2024-08-11 RX ADMIN — CETIRIZINE HYDROCHLORIDE 10 MG: 10 TABLET, FILM COATED ORAL at 08:47

## 2024-08-11 RX ADMIN — PHENYLEPHRINE HYDROCHLORIDE 103 MCG/MIN: 50 INJECTION INTRAVENOUS at 10:53

## 2024-08-11 RX ADMIN — DEXTROSE AND SODIUM CHLORIDE: 5; 450 INJECTION, SOLUTION INTRAVENOUS at 17:02

## 2024-08-11 RX ADMIN — PHENYLEPHRINE HYDROCHLORIDE 65 MCG/MIN: 50 INJECTION INTRAVENOUS at 22:58

## 2024-08-11 RX ADMIN — AZITHROMYCIN MONOHYDRATE 500 MG: 500 INJECTION, POWDER, LYOPHILIZED, FOR SOLUTION INTRAVENOUS at 04:01

## 2024-08-11 RX ADMIN — IPRATROPIUM BROMIDE AND ALBUTEROL SULFATE 1 DOSE: 2.5; .5 SOLUTION RESPIRATORY (INHALATION) at 10:36

## 2024-08-11 RX ADMIN — IPRATROPIUM BROMIDE AND ALBUTEROL SULFATE 1 DOSE: 2.5; .5 SOLUTION RESPIRATORY (INHALATION) at 07:15

## 2024-08-11 RX ADMIN — POTASSIUM CHLORIDE 20 MEQ: 29.8 INJECTION, SOLUTION INTRAVENOUS at 12:11

## 2024-08-11 RX ADMIN — SODIUM CHLORIDE, PRESERVATIVE FREE 10 ML: 5 INJECTION INTRAVENOUS at 08:49

## 2024-08-11 RX ADMIN — BUDESONIDE 500 MCG: 0.5 SUSPENSION RESPIRATORY (INHALATION) at 19:51

## 2024-08-11 RX ADMIN — BUDESONIDE 500 MCG: 0.5 SUSPENSION RESPIRATORY (INHALATION) at 07:15

## 2024-08-11 RX ADMIN — FENTANYL CITRATE 25 MCG: 0.05 INJECTION, SOLUTION INTRAMUSCULAR; INTRAVENOUS at 01:43

## 2024-08-11 RX ADMIN — IPRATROPIUM BROMIDE AND ALBUTEROL SULFATE 1 DOSE: 2.5; .5 SOLUTION RESPIRATORY (INHALATION) at 19:51

## 2024-08-11 RX ADMIN — SODIUM CHLORIDE, PRESERVATIVE FREE 40 MG: 5 INJECTION INTRAVENOUS at 21:52

## 2024-08-11 RX ADMIN — WATER 60 MG: 1 INJECTION INTRAMUSCULAR; INTRAVENOUS; SUBCUTANEOUS at 22:08

## 2024-08-11 RX ADMIN — IPRATROPIUM BROMIDE AND ALBUTEROL SULFATE 1 DOSE: 2.5; .5 SOLUTION RESPIRATORY (INHALATION) at 14:02

## 2024-08-11 RX ADMIN — POTASSIUM CHLORIDE 20 MEQ: 29.8 INJECTION, SOLUTION INTRAVENOUS at 08:22

## 2024-08-11 RX ADMIN — WATER 60 MG: 1 INJECTION INTRAMUSCULAR; INTRAVENOUS; SUBCUTANEOUS at 03:57

## 2024-08-11 RX ADMIN — FENTANYL CITRATE 25 MCG: 0.05 INJECTION, SOLUTION INTRAMUSCULAR; INTRAVENOUS at 09:45

## 2024-08-11 RX ADMIN — LACTULOSE 20 G: 20 SOLUTION ORAL at 21:52

## 2024-08-11 ASSESSMENT — PULMONARY FUNCTION TESTS
PIF_VALUE: 20
PIF_VALUE: 19
PIF_VALUE: 20
PIF_VALUE: 24
PIF_VALUE: 20

## 2024-08-11 ASSESSMENT — PAIN SCALES - GENERAL
PAINLEVEL_OUTOF10: 1
PAINLEVEL_OUTOF10: 0
PAINLEVEL_OUTOF10: 8
PAINLEVEL_OUTOF10: 4

## 2024-08-11 ASSESSMENT — PAIN - FUNCTIONAL ASSESSMENT
PAIN_FUNCTIONAL_ASSESSMENT: PREVENTS OR INTERFERES WITH ALL ACTIVE AND SOME PASSIVE ACTIVITIES
PAIN_FUNCTIONAL_ASSESSMENT: ACTIVITIES ARE NOT PREVENTED

## 2024-08-11 ASSESSMENT — PAIN DESCRIPTION - DESCRIPTORS: DESCRIPTORS: OTHER (COMMENT)

## 2024-08-11 ASSESSMENT — PAIN DESCRIPTION - ORIENTATION: ORIENTATION: OTHER (COMMENT)

## 2024-08-11 ASSESSMENT — PAIN DESCRIPTION - LOCATION: LOCATION: GENERALIZED

## 2024-08-11 ASSESSMENT — PAIN DESCRIPTION - PAIN TYPE: TYPE: ACUTE PAIN

## 2024-08-11 NOTE — PLAN OF CARE
Problem: Respiratory - Adult  Goal: Achieves optimal ventilation and oxygenation  8/11/2024 1947 by Mireya Sánchez RCP  Outcome: Progressing     Problem: Respiratory - Adult  Goal: Able to breathe comfortably  8/11/2024 1947 by Mireya Sánchez RCP  Outcome: Progressing     Problem: Respiratory - Adult  Goal: Patient's breath sounds will be clear and equal  8/11/2024 1947 by Mireya Sánchez RCP  Outcome: Progressing     Problem: Respiratory - Adult  Goal: Adequate oxygenation  Description: Adequate oxygenation  8/11/2024 1947 by Mireya Sánchez RCP  Outcome: Progressing     Problem: Respiratory - Adult  Goal: Will be able to breathe spontaneously, without ventilator support  Description: Will be able to breathe spontaneously, without ventilator support  Outcome: Progressing

## 2024-08-11 NOTE — PLAN OF CARE
Problem: Respiratory - Adult  Goal: Achieves optimal ventilation and oxygenation  8/11/2024 0715 by Marti Mccollum RCP  Outcome: Progressing     Problem: Respiratory - Adult  Goal: Able to breathe comfortably  8/11/2024 0715 by Marti Mccollum RCP  Outcome: Progressing     Problem: Respiratory - Adult  Goal: Patient's breath sounds will be clear and equal  8/11/2024 0715 by Marti Mccollum RCP  Outcome: Progressing     Problem: Respiratory - Adult  Goal: Adequate oxygenation  Description: Adequate oxygenation  8/11/2024 0715 by Marti Mccollum RCP  Outcome: Progressing     Problem: Respiratory - Adult  Goal: Will be able to breathe spontaneously, without ventilator support  Description: Will be able to breathe spontaneously, without ventilator support  8/10/2024 1938 by Mireya Sánchez RCP  Outcome: Progressing

## 2024-08-11 NOTE — PROGRESS NOTES
Pt's residuals containing small clots. Dipika sanders w/ gi notified. Ok to use for meds, hold off TF for now.

## 2024-08-11 NOTE — CONSULTS
(PEPCID) 40 MG tablet Take 1 tablet by mouth every evening   Yes Nicholas White MD   rifAXIMin (XIFAXAN) 550 MG tablet Take 1 tablet by mouth in the morning and 1 tablet in the evening. 9/13/23 9/7/24  Nicholas White MD   spironolactone (ALDACTONE) 25 MG tablet Take 1 tablet by mouth daily 5/17/23   Nicholas White MD   furosemide (LASIX) 40 MG tablet Take 1 tablet by mouth daily 5/17/23   Nicholas White MD   mirtazapine (REMERON) 15 MG tablet Take 1 tablet by mouth nightly 8/25/23   Nicholas White MD   carvedilol (COREG) 3.125 MG tablet Take 1 tablet by mouth 2 times daily (with meals) 5/16/23   Nicholas White MD     Scheduled Meds:   sodium chloride flush  5-40 mL IntraVENous 2 times per day    methylPREDNISolone  60 mg IntraVENous Q8H    ipratropium 0.5 mg-albuterol 2.5 mg  1 Dose Inhalation Q4H WA RT    budesonide  0.5 mg Nebulization BID RT    cefTRIAXone (ROCEPHIN) IV  1,000 mg IntraVENous Q24H    azithromycin  500 mg IntraVENous Q24H    lactulose  20 g Oral TID    mirtazapine  15 mg Oral Nightly    rifAXIMin  550 mg Oral BID    cetirizine  10 mg Oral Daily    pantoprazole (PROTONIX) 40 mg in sodium chloride (PF) 0.9 % 10 mL injection  40 mg IntraVENous Q12H     Continuous Infusions:   sodium chloride 10 mL/hr at 08/11/24 0628    midazolam 7 mg/hr (08/11/24 0628)    phenylephrine (REMI-SYNEPHRINE) 50 mg in sodium chloride 0.9 % 250 mL infusion 123 mcg/min (08/11/24 0628)    norepinephrine Stopped (08/10/24 1045)    dextrose 5 % and 0.45 % NaCl 75 mL/hr at 08/11/24 0628     PRN Meds:potassium chloride **OR** potassium chloride, sodium chloride flush, sodium chloride, magnesium sulfate, ondansetron **OR** ondansetron, polyethylene glycol, acetaminophen **OR** acetaminophen, albuterol, fentanNYL, midazolam, sodium phosphate 15 mmol in sodium chloride 0.9 % 250 mL IVPB    ALLERGIES:  No Known Allergies    SOCIAL HISTORY:    reports that he has quit smoking. His smoking use  included cigarettes. He has never used smokeless tobacco. He reports that he does not currently use alcohol. He reports that he does not use drugs.    FAMILY HISTORY:   History reviewed. No pertinent family history.    REVIEW OF SYSTEMS:  unable to obtain from patient     PHYSICAL EXAM:    BP (!) 148/66   Pulse (!) 47   Temp 97.4 °F (36.3 °C) (Axillary)   Resp 16   Ht 1.803 m (5' 11\")   Wt 90.7 kg (200 lb)   SpO2 100%   BMI 27.89 kg/m²     General:  sedated on vent    HEENT:  OG tube in situ, ET tube in situ    Chest: diminished bases, no crackles    Cardiovascular: S1S2, bradycardic    Abdomen:  soft, ND present bs    Skin/Musculoskeltal/Ext:  No jaundice. No +clubbing, no cyanosis, no pitting edema.  ROM not assessed.  + liver stigmata.      Lab and Imaging Review   Recent Labs     08/09/24  1900 08/10/24  0043 08/10/24  1223 08/11/24  0421   WBC 6.9 4.6 9.2 13.7*   HGB 14.8 15.7 14.6 14.0   .5* 105.6* 102.4 101.0   PLT 79* 72* 101* 91*   INR  --   --  1.4  --     142 142 144   K 4.9 5.2 4.0 3.5*    102 105 107   CO2 31 33* 22 22   BUN 16 18 23 27*   CREATININE 1.0 1.1 0.9 1.0   GLUCOSE 104* 177* 114* 137*   CALCIUM 9.4 9.6 9.0 8.7   AST 38 27 25 19   ALT 19 26 20 17   ALKPHOS 122 115 73 62   BILITOT 1.3* 1.5* 2.8* 2.3*   BILIDIR  --   --  0.7*  --    AMMONIA  --   --  67*  --    MG  --   --  1.7 1.7     Recent Labs     08/10/24  1223   INR 1.4   PROTIME 16.8*         IMPRESSION/PLAN:  72 yo male admitted with SOB, fatigue, hypoxia intubated.   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   Last EGD May 2023 +gastric varices, no esophageal varices.     OG without current evidence of bleeding from such, no melena or hematochezia, hgb 14.     Ok to use for medications, would hold on tube feeds for now. Initial placement may have been contact/tube trauma. If varices were bleeding would expect significant evidence of blood loss, which we are not  currently seeing.     Continue BID IV PPI.    Please update GI immediately if any signs of blood loss.     Check liver US in am, check AFP.     Angelica Bar CNP  Discussed with Dr. Foley      Attending Physician Statement  I have discussed the care of Aiden Black, including pertinent history, physical exam, and laboratory/radiology findings with the CNP.  I agree with the assessment, plan and orders as documented by the CNP, and, I have made modifications as appropriate.    Data reviewed.  If bleeding should occur or worsen, EGD will be required.  If the source of bleeding is gastric varices, there is no reliable endoscopic recourse available.  Transfer for TIPS procedure would be necessary.    Continue current therapy, including antibiotics.    Naresh Foley M.D.

## 2024-08-11 NOTE — PROGRESS NOTES
Pulmonary Critical Care Progress Note    Patient seen for the follow up of COPD exacerbation (HCC)     Subjective:    He is been on the ventilator intubated sedated on Versed at 6 mg.  He got agitated on decreasing sedation yesterday was biting on tube.  He is still on Lamont-Synephrine at 100 mcg/min.  OG was inserted yesterday per GI progression and there was blood clots in the OG tube insertion.  He was not started  and tube feeds for that reason per GI.  Lactulose restarted.  He is bradycardic heart rate in the 50s off Coreg home dose.  He was on tramadol at home for    Examination:    Vitals: BP (!) 148/66   Pulse 50   Temp 97.4 °F (36.3 °C) (Axillary)   Resp 16   Ht 1.803 m (5' 11\")   Wt 90.7 kg (200 lb)   SpO2 99%   BMI 27.89 kg/m²   SpO2  Av.8 %  Min: 98 %  Max: 100 %  General appearance: Intubated sedated unresponsive  Neck: No JVD  Lungs: Decreased breath sound no crackles or wheeze  Heart: regular rate and rhythm, S1, S2 normal, no gallop  Abdomen: Soft, non tender, + BS  Extremities: no cyanosis or clubbing. No significant edema    LABs:    CBC:   Recent Labs     08/10/24  1223 24  0421   WBC 9.2 13.7*   HGB 14.6 14.0   HCT 42.2 39.7*   * 91*     BMP:   Recent Labs     08/10/24  1223 24  0421 24  1100    144  --    K 4.0 3.5* 3.3*   CO2 22 22  --    BUN 23 27*  --    CREATININE 0.9 1.0  --    LABGLOM 89 78  --    GLUCOSE 114* 137*  --      PT/INR:   Recent Labs     08/10/24  1223   PROTIME 16.8*   INR 1.4     APTT:  Recent Labs     08/10/24  1223   APTT 25.2     LIVER PROFILE:  Recent Labs     08/10/24  1223 24  0421   AST 25 19   ALT 20 17       Radiology:    Chest x-ray 8/10  1. No acute cardiopulmonary process.  2. Interval placement of an OG tube. The distal tip is not imaged on this  exam.     Impression/recommendations;    Acute hypoxic and hypercarbic respiratory failure requiring intubation  Continue assist-control ventilation FiO2 30% PEEP of 8  respiratory 16 tidal volume 550     Sedation with Versed RASS -2  Fentanyl 25 every hour as needed for pain  Sedation vacation     COPD exacerbation  DuoNeb by nebulizer every 4 hours  Pulmicort 0.5 twice daily by nebulizer  Start Brovana by nebulizer twice daily  Rocephin Zithromax  Solu-Medrol 60 IV every 8 hours  Respiratory pathogen panel     Hypotension requiring pressors   Wean off Lamont-Synephrine to MAP above 65  Check hemoglobin hematocrit now  Blood cultures x 2 lactate procalcitonin  IV fluid     liver cirrhosis history esophageal varices/thrombocytopenia  GI on consult no EGD planned for now  Monitor OG output/monitor hemoglobin hematocrit  Lactulose Xifaxan/monitor ammonia  Monitor platelet count       CO2 narcosis/possible encephalopathy   Neurology oncology     Suspected sleep apnea   Outpatient sleep evaluation     discussed with wife and sons at bedside today   I previously advised to address CODE STATUS patient did not want prolonged ventilation  Discussed with RN   Peptic ulcer disease prophylaxis with Protonix 40 IV twice daily  DVT prophylaxis EPC    Sathish Bañuelos MD, MD, [facility]  Pulmonary Critical Care and Sleep Medicine,  [facility]  Cell: [phone]  Office: [phone]

## 2024-08-11 NOTE — CARE COORDINATION
Case Management Assessment  Initial Evaluation    Date/Time of Evaluation: 8/11/2024 11:39 AM  Assessment Completed by: MATTI BATEMAN RN    If patient is discharged prior to next notation, then this note serves as note for discharge by case management.    Patient Name: Aiden Black                   YOB: 1950  Diagnosis: Hypoxemia [R09.02]  COPD exacerbation (HCC) [J44.1]  Hypoxia [R09.02]                   Date / Time: 8/9/2024  6:45 PM    Patient Admission Status: Inpatient   Readmission Risk (Low < 19, Mod (19-27), High > 27): Readmission Risk Score: 14.2    Current PCP: Angelica Cruz MD  PCP verified by CM? Yes    Chart Reviewed: Yes      History Provided by: Child/Family  Patient Orientation: Sedated, Unable to Assess    Patient Cognition: Other (see comment) (intubated)    Hospitalization in the last 30 days (Readmission):  No    If yes, Readmission Assessment in  Navigator will be completed.    Advance Directives:      Code Status: Full Code   Patient's Primary Decision Maker is: Legal Next of Kin      Discharge Planning:    Patient lives with: Spouse/Significant Other Type of Home: House  Primary Care Giver: Other (Comment) (independent prior to admit)  Patient Support Systems include: Spouse/Significant Other, Children, Family Members   Current Financial resources: Medicare  Current community resources: ECF/Home Care  Current services prior to admission: Durable Medical Equipment            Current DME: Walker, Home Aerosol (RTS)            Type of Home Care services:  Aide Services, OT, PT, Nursing Services    ADLS  Prior functional level: Independent in ADLs/IADLs  Current functional level: Bathing, Dressing, Toileting, Feeding, Cooking, Housework, Shopping, Mobility    PT AM-PAC:   /24  OT AM-PAC:   /24    Family can provide assistance at DC: Yes  Would you like Case Management to discuss the discharge plan with any other family members/significant others, and if so, who?  Yes  Plans to Return to Present Housing: Unknown at present  Other Identified Issues/Barriers to RETURNING to current housing: DME pulmonary clearance   Potential Assistance needed at discharge: Skilled Nursing Facility, Home Care, Outpatient PT/OT, Long Term Acute Care, Durable Medical Equipment            Potential DME: Hospital Bed, Oxygen Therapy (Comment)  Patient expects to discharge to: Unknown  Plan for transportation at discharge: Other (see comment) (TBD)    Financial    Payor: [facility] MEDICARE / Plan: [employer] MEDIBLUE ESSENTIAL/PLUS / Product Type: *No Product type* /     Does insurance require precert for SNF: Yes    Potential assistance Purchasing Medications: No  Meds-to-Beds request:        [facility] [id number] - [place], [place] - [address] [phone] - F [phone]  [address] [address]  Phone: [phone] Fax: [phone]      Notes:    Factors facilitating achievement of predicted outcomes: Family support    Barriers to discharge: Medical complications and Medication managment    Additional Case Management Notes:   Patient intubated and son Anuel at the bedside. Assessment initiated with son.     Patient lives with wife in 2 story home but bed and bath on the first floor. DME in home includes RW, RTS and neb.     Patient has used home care in past but son unsure of who and was at snf but unsure of which one possibly [facility] or [facility].     Patient admitted with COPD and intubated. On IV atb, versed, levo and sammy gtts.  GI consulted for esophageal varicies and critical care for vent following.     Discussed with son that too early to at this time to decide but multiple plans can be explored. Explained LTAC vs ARU vs SNF vs home care.  Discussed if home will need to watch for home 02. Son is requesting hospital bed for home.     Will follow     The Plan for Transition of Care is related to the following treatment goals of Hypoxemia [R09.02]  COPD exacerbation  (HCC) [J44.1]  Hypoxia [R09.02]    IF APPLICABLE: The Patient and/or patient representative Aiden and his family were provided with a choice of provider and agrees with the discharge plan. Freedom of choice list with basic dialogue that supports the patient's individualized plan of care/goals and shares the quality data associated with the providers was provided to: Patient Representative   Patient Representative Name: emily Agee     The Patient and/or Patient Representative Agree with the Discharge Plan? Yes    MATTI BATEMAN RN  Case Management Department

## 2024-08-11 NOTE — PLAN OF CARE
Problem: Discharge Planning  Goal: Discharge to home or other facility with appropriate resources  8/11/2024 1128 by Erick Yadav RN  Outcome: Progressing  Flowsheets (Taken 8/11/2024 0800)  Discharge to home or other facility with appropriate resources: Identify barriers to discharge with patient and caregiver  8/11/2024 0557 by Mercedes Calderon RN  Outcome: Progressing  Flowsheets (Taken 8/11/2024 0557)  Discharge to home or other facility with appropriate resources:   Identify barriers to discharge with patient and caregiver   Arrange for needed discharge resources and transportation as appropriate   Identify discharge learning needs (meds, wound care, etc)     Problem: Safety - Medical Restraint  Goal: Remains free of injury from restraints (Restraint for Interference with Medical Device)  Description: INTERVENTIONS:  1. Determine that other, less restrictive measures have been tried or would not be effective before applying the restraint  2. Evaluate the patient's condition at the time of restraint application  3. Inform patient/family regarding the reason for restraint  4. Q2H: Monitor safety, psychosocial status, comfort, nutrition and hydration  8/11/2024 1128 by Erick aYdav RN  Outcome: Progressing  8/11/2024 0557 by Mercedes Calderon RN  Outcome: Progressing  Flowsheets (Taken 8/10/2024 2200)  Remains free of injury from restraints (restraint for interference with medical device):   Determine that other, less restrictive measures have been tried or would not be effective before applying the restraint   Evaluate the patient's condition at the time of restraint application   Inform patient/family regarding the reason for restraint   Every 2 hours: Monitor safety, psychosocial status, comfort, nutrition and hydration     Problem: Safety - Adult  Goal: Free from fall injury  8/11/2024 1128 by Erick Yadav, RN  Outcome: Progressing  8/11/2024 0557 by Mercedes Calderon RN  Outcome: Progressing  Flowsheets (Taken  8/11/2024 0557)  Free From Fall Injury: Instruct family/caregiver on patient safety     Problem: Respiratory - Adult  Goal: Achieves optimal ventilation and oxygenation  8/11/2024 1128 by Erick Yadav RN  Outcome: Progressing  Flowsheets (Taken 8/11/2024 0800)  Achieves optimal ventilation and oxygenation: Assess for changes in respiratory status  8/11/2024 0715 by Marti Mccollum RCP  Outcome: Progressing  8/11/2024 0557 by Mercedes Calderon RN  Outcome: Progressing  Flowsheets  Taken 8/11/2024 0557 by Mercedes Calderon RN  Achieves optimal ventilation and oxygenation:   Assess for changes in respiratory status   Assess for changes in mentation and behavior   Position to facilitate oxygenation and minimize respiratory effort  Taken 8/11/2024 0331 by Mireya Sánchez RCP  Achieves optimal ventilation and oxygenation:   Assess for changes in respiratory status   Position to facilitate oxygenation and minimize respiratory effort   Assess the need for suctioning and aspirate as needed   Respiratory therapy support as indicated  Taken 8/10/2024 2333 by Mireya Sánchez RCP  Achieves optimal ventilation and oxygenation:   Assess for changes in respiratory status   Oxygen supplementation based on oxygen saturation or arterial blood gases   Assess the need for suctioning and aspirate as needed   Respiratory therapy support as indicated  Goal: Able to breathe comfortably  8/11/2024 0715 by Marti Mccollum RCP  Outcome: Progressing  Goal: Patient's breath sounds will be clear and equal  8/11/2024 0715 by Marti Mccollum RCP  Outcome: Progressing  Goal: Adequate oxygenation  Description: Adequate oxygenation  8/11/2024 0715 by Marti Mccollum RCP  Outcome: Progressing     Problem: Skin/Tissue Integrity  Goal: Absence of new skin breakdown  Description: 1.  Monitor for areas of redness and/or skin breakdown  2.  Assess vascular access sites hourly  3.  Every 4-6 hours minimum:  Change oxygen saturation probe site  4.   Every 4-6 hours:  If on nasal continuous positive airway pressure, respiratory therapy assess nares and determine need for appliance change or resting period.  8/11/2024 1128 by Erick Yadav RN  Outcome: Progressing  8/11/2024 0557 by Mercedes Calderon RN  Outcome: Progressing     Problem: ABCDS Injury Assessment  Goal: Absence of physical injury  Outcome: Progressing     Problem: Pain  Goal: Verbalizes/displays adequate comfort level or baseline comfort level  8/11/2024 1128 by Erick Yadav RN  Outcome: Progressing  8/11/2024 0557 by Mercedes Calderon RN  Outcome: Progressing  Flowsheets (Taken 8/11/2024 0557)  Verbalizes/displays adequate comfort level or baseline comfort level:   Encourage patient to monitor pain and request assistance   Assess pain using appropriate pain scale   Administer analgesics based on type and severity of pain and evaluate response

## 2024-08-11 NOTE — PROGRESS NOTES
End Of Shift Note  [facility]  Summary of shift: Pt stable throughout shift. Remi weaned down to 110mcg/min, residuals from og had mall blood clots: gi notified: ok to give meds, hold off on TF for time being. U/o 770ml, no BM. Has been bradycardic, notified critcare: per zeitouni switch remi to levophed. Gave prn fentanyl x1 for vent compliance/pain Versed at 7mg/hr d/t not tolerating vent. Plan today is to wean off sedation, and get neuro input.     Vitals:    Vitals:    08/11/24 0645 08/11/24 0700 08/11/24 0712 08/11/24 0715   BP:       Pulse: 52 50 (!) 49 (!) 47   Resp: 16 16 16 16   Temp:       TempSrc:       SpO2: 100% 100% 100% 100%   Weight:       Height:            I&O:   Intake/Output Summary (Last 24 hours) at 8/11/2024 0726  Last data filed at 8/11/2024 0642  Gross per 24 hour   Intake 4157.88 ml   Output 770 ml   Net 3387.88 ml       Resp Status: room air     Ventilator Settings:  Vent Mode: AC/PRVC Resp Rate (Set): 16 bpm/Vt (Set, mL): 550 mL/ /FiO2 : 30 %    Critical Care IV infusions:   sodium chloride 10 mL/hr at 08/11/24 0628    midazolam 7 mg/hr (08/11/24 0628)    phenylephrine (REMI-SYNEPHRINE) 50 mg in sodium chloride 0.9 % 250 mL infusion 123 mcg/min (08/11/24 0628)    norepinephrine Stopped (08/10/24 1045)    dextrose 5 % and 0.45 % NaCl 75 mL/hr at 08/11/24 0628        LDA:   PICC 08/10/24 Left Brachial (Active)   Number of days: 0       Peripheral IV 08/09/24 Right Forearm (Active)   Number of days: 1       Peripheral IV 08/10/24 Distal;Left;Anterior Forearm (Active)   Number of days: 1       NG/OG/NJ/NE Tube Orogastric Center mouth (Active)   Number of days: 0       Urinary Catheter 08/10/24 Roca (Active)   Number of days: 1       ETT  (Active)   Number of days: 1       Arterial Line 08/10/24 Right Radial (Active)   Number of days: 1

## 2024-08-11 NOTE — PLAN OF CARE
Problem: Discharge Planning  Goal: Discharge to home or other facility with appropriate resources  8/11/2024 0557 by Mercedes Calderon RN  Outcome: Progressing  Flowsheets (Taken 8/11/2024 0557)  Discharge to home or other facility with appropriate resources:   Identify barriers to discharge with patient and caregiver   Arrange for needed discharge resources and transportation as appropriate   Identify discharge learning needs (meds, wound care, etc)     Problem: Safety - Medical Restraint  Goal: Remains free of injury from restraints (Restraint for Interference with Medical Device)  Description: INTERVENTIONS:  1. Determine that other, less restrictive measures have been tried or would not be effective before applying the restraint  2. Evaluate the patient's condition at the time of restraint application  3. Inform patient/family regarding the reason for restraint  4. Q2H: Monitor safety, psychosocial status, comfort, nutrition and hydration  8/11/2024 0557 by Mercedes Calderon RN  Outcome: Progressing  Flowsheets (Taken 8/10/2024 2200)  Remains free of injury from restraints (restraint for interference with medical device):   Determine that other, less restrictive measures have been tried or would not be effective before applying the restraint   Evaluate the patient's condition at the time of restraint application   Inform patient/family regarding the reason for restraint   Every 2 hours: Monitor safety, psychosocial status, comfort, nutrition and hydration     Problem: Safety - Adult  Goal: Free from fall injury  8/11/2024 0557 by Mercedes Calderon RN  Outcome: Progressing  Flowsheets (Taken 8/11/2024 0557)  Free From Fall Injury: Instruct family/caregiver on patient safety     Problem: Respiratory - Adult  Goal: Achieves optimal ventilation and oxygenation  8/11/2024 0557 by Mercedes Calderon RN  Outcome: Progressing  Flowsheets  Taken 8/11/2024 0557 by Mercedes Calderon RN  Achieves optimal ventilation and oxygenation:   Assess

## 2024-08-11 NOTE — PROGRESS NOTES
End Of Shift Note  [facility]  Summary of shift: Pt has been calm and cooperative. Medicated as ordered. Ivs have infused without signs or symptoms of adverse reactions or infiltrations. Call light has been within reach, pt has been checked on for safety. Pt has had family in room most of shift. They would like to be notified an hour prior to cpap trial.    Vitals:    Vitals:    08/11/24 1815 08/11/24 1830 08/11/24 1845 08/11/24 1900   BP:       Pulse: (!) 46 (!) 44 (!) 46 (!) 44   Resp: 16 16 16 16   Temp:       TempSrc:       SpO2: 99% 100% 100% 99%   Weight:       Height:            I&O:   Intake/Output Summary (Last 24 hours) at 8/11/2024 1915  Last data filed at 8/11/2024 0642  Gross per 24 hour   Intake 2148.57 ml   Output 420 ml   Net 1728.57 ml       Resp Status: vented    Ventilator Settings:  Vent Mode: AC/PRVC Resp Rate (Set): 16 bpm/Vt (Set, mL): 550 mL/ /FiO2 : 30 %    Critical Care IV infusions:   sodium chloride 10 mL/hr at 08/11/24 0628    midazolam 5 mg/hr (08/11/24 1727)    phenylephrine (REMI-SYNEPHRINE) 50 mg in sodium chloride 0.9 % 250 mL infusion 75 mcg/min (08/11/24 1725)    norepinephrine Stopped (08/10/24 1045)    dextrose 5 % and 0.45 % NaCl 75 mL/hr at 08/11/24 1702        LDA:   PICC 08/10/24 Left Brachial (Active)   Number of days: 1       Peripheral IV 08/09/24 Right Forearm (Active)   Number of days: 2       Peripheral IV 08/10/24 Distal;Left;Anterior Forearm (Active)   Number of days: 1       NG/OG/NJ/NE Tube Orogastric Center mouth (Active)   Number of days: 1       Urinary Catheter 08/10/24 Roca (Active)   Number of days: 1       ETT  (Active)   Number of days: 1       Arterial Line 08/10/24 Right Radial (Active)   Number of days: 1

## 2024-08-12 ENCOUNTER — APPOINTMENT (OUTPATIENT)
Dept: GENERAL RADIOLOGY | Age: 74
End: 2024-08-12
Payer: MEDICARE

## 2024-08-12 ENCOUNTER — APPOINTMENT (OUTPATIENT)
Dept: ULTRASOUND IMAGING | Age: 74
End: 2024-08-12
Payer: MEDICARE

## 2024-08-12 LAB
AFP SERPL-MCNC: 2.1 UG/L
ALBUMIN SERPL-MCNC: 2.6 G/DL (ref 3.5–5.2)
ALLEN TEST: ABNORMAL
ALP SERPL-CCNC: 51 U/L (ref 40–129)
ALT SERPL-CCNC: 16 U/L (ref 5–41)
AMMONIA PLAS-SCNC: 62 UMOL/L (ref 16–60)
ANION GAP SERPL CALCULATED.3IONS-SCNC: 9 MMOL/L (ref 9–17)
AST SERPL-CCNC: 18 U/L
BASOPHILS # BLD: <0.03 K/UL (ref 0–0.2)
BASOPHILS NFR BLD: 0 % (ref 0–2)
BILIRUB SERPL-MCNC: 1.5 MG/DL (ref 0.3–1.2)
BUN SERPL-MCNC: 24 MG/DL (ref 8–23)
BUN/CREAT SERPL: 30 (ref 9–20)
CALCIUM SERPL-MCNC: 7.4 MG/DL (ref 8.6–10.4)
CHLORIDE SERPL-SCNC: 109 MMOL/L (ref 98–107)
CO2 SERPL-SCNC: 20 MMOL/L (ref 20–31)
CREAT SERPL-MCNC: 0.8 MG/DL (ref 0.7–1.2)
EOSINOPHIL # BLD: <0.03 K/UL (ref 0–0.44)
EOSINOPHILS RELATIVE PERCENT: 0 % (ref 1–4)
ERYTHROCYTE [DISTWIDTH] IN BLOOD BY AUTOMATED COUNT: 14.5 % (ref 11.8–14.4)
FIO2: 30
GFR, ESTIMATED: >90 ML/MIN/1.73M2
GLUCOSE BLD-MCNC: 122 MG/DL (ref 75–110)
GLUCOSE BLD-MCNC: 123 MG/DL (ref 75–110)
GLUCOSE BLD-MCNC: 128 MG/DL (ref 75–110)
GLUCOSE SERPL-MCNC: 448 MG/DL (ref 70–99)
HCT VFR BLD AUTO: 35.9 % (ref 40.7–50.3)
HGB BLD-MCNC: 11.9 G/DL (ref 13–17)
IMM GRANULOCYTES # BLD AUTO: 0.08 K/UL (ref 0–0.3)
IMM GRANULOCYTES NFR BLD: 1 %
LYMPHOCYTES NFR BLD: 1.11 K/UL (ref 1.1–3.7)
LYMPHOCYTES RELATIVE PERCENT: 10 % (ref 24–43)
MCH RBC QN AUTO: 35.1 PG (ref 25.2–33.5)
MCHC RBC AUTO-ENTMCNC: 33.1 G/DL (ref 28.4–34.8)
MCV RBC AUTO: 105.9 FL (ref 82.6–102.9)
MODE: ABNORMAL
MONOCYTES NFR BLD: 0.33 K/UL (ref 0.1–1.2)
MONOCYTES NFR BLD: 3 % (ref 3–12)
MYOGLOBIN SERPL-MCNC: 128 NG/ML (ref 28–72)
MYOGLOBIN SERPL-MCNC: 232 NG/ML (ref 28–72)
MYOGLOBIN SERPL-MCNC: 237 NG/ML (ref 28–72)
NEGATIVE BASE EXCESS, ART: 1.7 MMOL/L (ref 0–2)
NEUTROPHILS NFR BLD: 87 % (ref 36–65)
NEUTS SEG NFR BLD: 9.8 K/UL (ref 1.5–8.1)
NRBC BLD-RTO: 0 PER 100 WBC
O2 DELIVERY DEVICE: ABNORMAL
PLATELET # BLD AUTO: 70 K/UL (ref 138–453)
PMV BLD AUTO: 10.5 FL (ref 8.1–13.5)
POC HCO3: 22.6 MMOL/L (ref 21–28)
POC O2 SATURATION: 98.8 % (ref 94–98)
POC PCO2: 36.1 MM HG (ref 35–48)
POC PH: 7.41 (ref 7.35–7.45)
POC PO2: 123.3 MM HG (ref 83–108)
POTASSIUM SERPL-SCNC: 3.9 MMOL/L (ref 3.7–5.3)
PROT SERPL-MCNC: 4.9 G/DL (ref 6.4–8.3)
RBC # BLD AUTO: 3.39 M/UL (ref 4.21–5.77)
RBC # BLD: ABNORMAL 10*6/UL
RBC # BLD: ABNORMAL 10*6/UL
SAMPLE SITE: ABNORMAL
SODIUM SERPL-SCNC: 138 MMOL/L (ref 135–144)
TROPONIN I SERPL HS-MCNC: 10 NG/L (ref 0–22)
TROPONIN I SERPL HS-MCNC: 11 NG/L (ref 0–22)
TROPONIN I SERPL HS-MCNC: 8 NG/L (ref 0–22)
WBC OTHER # BLD: 11.3 K/UL (ref 3.5–11.3)

## 2024-08-12 PROCEDURE — 93005 ELECTROCARDIOGRAM TRACING: CPT | Performed by: INTERNAL MEDICINE

## 2024-08-12 PROCEDURE — 37799 UNLISTED PX VASCULAR SURGERY: CPT

## 2024-08-12 PROCEDURE — 82105 ALPHA-FETOPROTEIN SERUM: CPT

## 2024-08-12 PROCEDURE — 6370000000 HC RX 637 (ALT 250 FOR IP): Performed by: NURSE PRACTITIONER

## 2024-08-12 PROCEDURE — 84484 ASSAY OF TROPONIN QUANT: CPT

## 2024-08-12 PROCEDURE — 80053 COMPREHEN METABOLIC PANEL: CPT

## 2024-08-12 PROCEDURE — 94761 N-INVAS EAR/PLS OXIMETRY MLT: CPT

## 2024-08-12 PROCEDURE — 82140 ASSAY OF AMMONIA: CPT

## 2024-08-12 PROCEDURE — 76705 ECHO EXAM OF ABDOMEN: CPT

## 2024-08-12 PROCEDURE — 94003 VENT MGMT INPAT SUBQ DAY: CPT

## 2024-08-12 PROCEDURE — 2580000003 HC RX 258: Performed by: INTERNAL MEDICINE

## 2024-08-12 PROCEDURE — 6370000000 HC RX 637 (ALT 250 FOR IP): Performed by: INTERNAL MEDICINE

## 2024-08-12 PROCEDURE — 83874 ASSAY OF MYOGLOBIN: CPT

## 2024-08-12 PROCEDURE — 6370000000 HC RX 637 (ALT 250 FOR IP): Performed by: FAMILY MEDICINE

## 2024-08-12 PROCEDURE — 2580000003 HC RX 258: Performed by: NURSE PRACTITIONER

## 2024-08-12 PROCEDURE — 6360000002 HC RX W HCPCS: Performed by: INTERNAL MEDICINE

## 2024-08-12 PROCEDURE — 85025 COMPLETE CBC W/AUTO DIFF WBC: CPT

## 2024-08-12 PROCEDURE — 2700000000 HC OXYGEN THERAPY PER DAY

## 2024-08-12 PROCEDURE — 71045 X-RAY EXAM CHEST 1 VIEW: CPT

## 2024-08-12 PROCEDURE — 94640 AIRWAY INHALATION TREATMENT: CPT

## 2024-08-12 PROCEDURE — 2500000003 HC RX 250 WO HCPCS: Performed by: INTERNAL MEDICINE

## 2024-08-12 PROCEDURE — 99223 1ST HOSP IP/OBS HIGH 75: CPT | Performed by: NURSE PRACTITIONER

## 2024-08-12 PROCEDURE — 82803 BLOOD GASES ANY COMBINATION: CPT

## 2024-08-12 PROCEDURE — 2000000000 HC ICU R&B

## 2024-08-12 PROCEDURE — 82947 ASSAY GLUCOSE BLOOD QUANT: CPT

## 2024-08-12 RX ORDER — DEXTROSE MONOHYDRATE 100 MG/ML
INJECTION, SOLUTION INTRAVENOUS CONTINUOUS PRN
Status: DISCONTINUED | OUTPATIENT
Start: 2024-08-12 | End: 2024-08-22 | Stop reason: HOSPADM

## 2024-08-12 RX ORDER — SODIUM CHLORIDE 9 MG/ML
INJECTION, SOLUTION INTRAVENOUS CONTINUOUS
Status: DISCONTINUED | OUTPATIENT
Start: 2024-08-12 | End: 2024-08-21

## 2024-08-12 RX ORDER — INSULIN LISPRO 100 [IU]/ML
10 INJECTION, SOLUTION INTRAVENOUS; SUBCUTANEOUS ONCE
Status: COMPLETED | OUTPATIENT
Start: 2024-08-12 | End: 2024-08-12

## 2024-08-12 RX ORDER — INSULIN LISPRO 100 [IU]/ML
0-4 INJECTION, SOLUTION INTRAVENOUS; SUBCUTANEOUS NIGHTLY
Status: DISCONTINUED | OUTPATIENT
Start: 2024-08-12 | End: 2024-08-13

## 2024-08-12 RX ORDER — LACTULOSE 10 G/15ML
30 SOLUTION ORAL 3 TIMES DAILY
Status: DISCONTINUED | OUTPATIENT
Start: 2024-08-12 | End: 2024-08-16

## 2024-08-12 RX ORDER — INSULIN LISPRO 100 [IU]/ML
0-4 INJECTION, SOLUTION INTRAVENOUS; SUBCUTANEOUS
Status: DISCONTINUED | OUTPATIENT
Start: 2024-08-12 | End: 2024-08-13

## 2024-08-12 RX ORDER — MIDODRINE HYDROCHLORIDE 5 MG/1
5 TABLET ORAL
Status: DISCONTINUED | OUTPATIENT
Start: 2024-08-12 | End: 2024-08-16

## 2024-08-12 RX ADMIN — WATER 60 MG: 1 INJECTION INTRAMUSCULAR; INTRAVENOUS; SUBCUTANEOUS at 18:28

## 2024-08-12 RX ADMIN — BUDESONIDE 500 MCG: 0.5 SUSPENSION RESPIRATORY (INHALATION) at 19:13

## 2024-08-12 RX ADMIN — MIDODRINE HYDROCHLORIDE 5 MG: 5 TABLET ORAL at 12:47

## 2024-08-12 RX ADMIN — IPRATROPIUM BROMIDE AND ALBUTEROL SULFATE 1 DOSE: 2.5; .5 SOLUTION RESPIRATORY (INHALATION) at 07:17

## 2024-08-12 RX ADMIN — INSULIN LISPRO 10 UNITS: 100 INJECTION, SOLUTION INTRAVENOUS; SUBCUTANEOUS at 08:31

## 2024-08-12 RX ADMIN — IPRATROPIUM BROMIDE AND ALBUTEROL SULFATE 1 DOSE: 2.5; .5 SOLUTION RESPIRATORY (INHALATION) at 19:13

## 2024-08-12 RX ADMIN — DEXTROSE AND SODIUM CHLORIDE: 5; 450 INJECTION, SOLUTION INTRAVENOUS at 06:27

## 2024-08-12 RX ADMIN — WATER 1000 MG: 1 INJECTION INTRAMUSCULAR; INTRAVENOUS; SUBCUTANEOUS at 04:09

## 2024-08-12 RX ADMIN — BUDESONIDE 500 MCG: 0.5 SUSPENSION RESPIRATORY (INHALATION) at 07:17

## 2024-08-12 RX ADMIN — SODIUM CHLORIDE: 9 INJECTION, SOLUTION INTRAVENOUS at 08:30

## 2024-08-12 RX ADMIN — SODIUM CHLORIDE, PRESERVATIVE FREE 10 ML: 5 INJECTION INTRAVENOUS at 09:55

## 2024-08-12 RX ADMIN — SODIUM CHLORIDE, PRESERVATIVE FREE 10 ML: 5 INJECTION INTRAVENOUS at 21:12

## 2024-08-12 RX ADMIN — LACTULOSE 20 G: 20 SOLUTION ORAL at 09:54

## 2024-08-12 RX ADMIN — CETIRIZINE HYDROCHLORIDE 10 MG: 10 TABLET, FILM COATED ORAL at 09:55

## 2024-08-12 RX ADMIN — RIFAXIMIN 550 MG: 550 TABLET ORAL at 09:55

## 2024-08-12 RX ADMIN — IPRATROPIUM BROMIDE AND ALBUTEROL SULFATE 1 DOSE: 2.5; .5 SOLUTION RESPIRATORY (INHALATION) at 15:43

## 2024-08-12 RX ADMIN — Medication 5 MCG/MIN: at 10:29

## 2024-08-12 RX ADMIN — LACTULOSE 30 G: 20 SOLUTION ORAL at 21:12

## 2024-08-12 RX ADMIN — ARFORMOTEROL TARTRATE 15 MCG: 15 SOLUTION RESPIRATORY (INHALATION) at 07:16

## 2024-08-12 RX ADMIN — LACTULOSE 30 G: 20 SOLUTION ORAL at 15:26

## 2024-08-12 RX ADMIN — MIRTAZAPINE 15 MG: 15 TABLET, FILM COATED ORAL at 21:13

## 2024-08-12 RX ADMIN — AZITHROMYCIN MONOHYDRATE 500 MG: 500 INJECTION, POWDER, LYOPHILIZED, FOR SOLUTION INTRAVENOUS at 04:14

## 2024-08-12 RX ADMIN — SODIUM CHLORIDE, PRESERVATIVE FREE 40 MG: 5 INJECTION INTRAVENOUS at 21:12

## 2024-08-12 RX ADMIN — WATER 60 MG: 1 INJECTION INTRAMUSCULAR; INTRAVENOUS; SUBCUTANEOUS at 12:46

## 2024-08-12 RX ADMIN — MIDODRINE HYDROCHLORIDE 5 MG: 5 TABLET ORAL at 18:28

## 2024-08-12 RX ADMIN — IPRATROPIUM BROMIDE AND ALBUTEROL SULFATE 1 DOSE: 2.5; .5 SOLUTION RESPIRATORY (INHALATION) at 10:52

## 2024-08-12 RX ADMIN — SODIUM CHLORIDE, PRESERVATIVE FREE 40 MG: 5 INJECTION INTRAVENOUS at 09:54

## 2024-08-12 RX ADMIN — ARFORMOTEROL TARTRATE 15 MCG: 15 SOLUTION RESPIRATORY (INHALATION) at 19:13

## 2024-08-12 RX ADMIN — WATER 60 MG: 1 INJECTION INTRAMUSCULAR; INTRAVENOUS; SUBCUTANEOUS at 04:10

## 2024-08-12 RX ADMIN — RIFAXIMIN 550 MG: 550 TABLET ORAL at 21:13

## 2024-08-12 ASSESSMENT — PAIN SCALES - GENERAL
PAINLEVEL_OUTOF10: 0
PAINLEVEL_OUTOF10: 3
PAINLEVEL_OUTOF10: 0
PAINLEVEL_OUTOF10: 1
PAINLEVEL_OUTOF10: 3

## 2024-08-12 ASSESSMENT — PULMONARY FUNCTION TESTS
PIF_VALUE: 20
PIF_VALUE: 16

## 2024-08-12 NOTE — CONSULTS
Q12H     glucose, dextrose bolus **OR** dextrose bolus, glucagon (rDNA), dextrose, potassium chloride **OR** potassium chloride, sodium chloride flush, sodium chloride, magnesium sulfate, ondansetron **OR** ondansetron, polyethylene glycol, acetaminophen **OR** acetaminophen, albuterol, fentanNYL, midazolam, sodium phosphate 15 mmol in sodium chloride 0.9 % 250 mL IVPB  IV Drips/Infusions   sodium chloride 50 mL/hr at 08/12/24 0830    dextrose      sodium chloride 10 mL/hr at 08/11/24 0628    midazolam 4 mg/hr (08/12/24 0450)    phenylephrine (REMI-SYNEPHRINE) 50 mg in sodium chloride 0.9 % 250 mL infusion 70 mcg/min (08/12/24 0450)    norepinephrine 5 mcg/min (08/12/24 1029)     Home Medications  No current facility-administered medications on file prior to encounter.     Current Outpatient Medications on File Prior to Encounter   Medication Sig Dispense Refill    lactulose (CHRONULAC) 10 GM/15ML solution Take 30 mLs by mouth 3 times daily      albuterol (PROVENTIL) (2.5 MG/3ML) 0.083% nebulizer solution Inhale 3 mLs into the lungs 3 times daily      loratadine (CLARITIN) 10 MG tablet Take 1 tablet by mouth daily as needed      famotidine (PEPCID) 40 MG tablet Take 1 tablet by mouth every evening      rifAXIMin (XIFAXAN) 550 MG tablet Take 1 tablet by mouth in the morning and 1 tablet in the evening.      spironolactone (ALDACTONE) 25 MG tablet Take 1 tablet by mouth daily      furosemide (LASIX) 40 MG tablet Take 1 tablet by mouth daily      mirtazapine (REMERON) 15 MG tablet Take 1 tablet by mouth nightly      carvedilol (COREG) 3.125 MG tablet Take 1 tablet by mouth 2 times daily (with meals)           Data         /63   Pulse 63   Temp 97.7 °F (36.5 °C) (Oral)   Resp 16   Ht 1.803 m (5' 11\")   Wt 90.7 kg (200 lb)   SpO2 100%   BMI 27.89 kg/m²     Wt Readings from Last 3 Encounters:   08/10/24 90.7 kg (200 lb)   07/15/24 86.2 kg (190 lb)   12/12/23 89.8 kg (198 lb)        Lab Results   Component  Value Date    WBC 11.3 08/12/2024    HGB 11.9 (L) 08/12/2024    HCT 35.9 (L) 08/12/2024    .9 (H) 08/12/2024    PLT 70 (L) 08/12/2024    ,   Lab Results   Component Value Date/Time     08/12/2024 05:32 AM    K 3.9 08/12/2024 05:32 AM     08/12/2024 05:32 AM    CO2 20 08/12/2024 05:32 AM    BUN 24 08/12/2024 05:32 AM    CREATININE 0.8 08/12/2024 05:32 AM    GLUCOSE 448 08/12/2024 05:32 AM    GLUCOSE 121 07/15/2024 10:34 AM    CALCIUM 7.4 08/12/2024 05:32 AM    ,   Lab Results   Component Value Date    INR 1.4 08/10/2024    INR 1.2 (H) 02/22/2024    PROTIME 16.8 (H) 08/10/2024    PROTIME 12.1 (H) 02/22/2024   , .  Lab Results   Component Value Date    ALT 16 08/12/2024    AST 18 08/12/2024    ALKPHOS 51 08/12/2024    BILITOT 1.5 (H) 08/12/2024   , No results found for: \"CKTOTAL\", \"CKMB\", \"CKMBINDEX\", \"TROPONINI\", No results found for: \"VOL\", \"APPEARANCE\", \"COLORU\", \"LABSPEC\", \"LABPH\", \"LEUKBLD\", \"NITRU\", \"GLUCOSEU\", \"KETUA\", \"UROBILINOGEN\", \"BILIRUBINUR\", \"OCBU\", No results found for: \"AMYLASE\", No results found for: \"LIPASE\", No results found for: \"DDIMER\", No results found for: \"BNP\", No results found for: \"CEA\", No results found for: \"\", No results found for: \"AFPAMN\",   Lab Results   Component Value Date    LACTA 1.7 08/09/2024   ,     CT Result (most recent):  CT HEAD WO CONTRAST 08/10/2024    Narrative  EXAMINATION:  CT OF THE HEAD WITHOUT CONTRAST  8/10/2024 2:40 am    TECHNIQUE:  CT of the head was performed without the administration of intravenous  contrast. Automated exposure control, iterative reconstruction, and/or weight  based adjustment of the mA/kV was utilized to reduce the radiation dose to as  low as reasonably achievable.    COMPARISON:  None.    HISTORY:  ORDERING SYSTEM PROVIDED HISTORY: lethargy  TECHNOLOGIST PROVIDED HISTORY:  lethargy      FINDINGS:  BRAIN/VENTRICLES: There is no acute intracranial hemorrhage, mass effect or  midline shift.  No abnormal extra-axial  fluid collection.  The gray-white  differentiation is maintained without evidence of an acute infarct.  There is  no evidence of hydrocephalus. Moderate diffuse cerebral atrophy and mild  chronic white matter ischemic change.    ORBITS: The visualized portion of the orbits demonstrate no acute abnormality.    SINUSES: The visualized paranasal sinuses and mastoid air cells demonstrate  no acute abnormality.    SOFT TISSUES/SKULL:  No acute abnormality of the visualized skull or soft  tissues.    Impression  No acute intracranial abnormality.       Xray Result (most recent):  XR CHEST PORTABLE 08/12/2024    Narrative  EXAMINATION:  ONE XRAY VIEW OF THE CHEST    8/12/2024 6:11 am    COMPARISON:  August 11, 2024    HISTORY:  ORDERING SYSTEM PROVIDED HISTORY: vent maintenance  TECHNOLOGIST PROVIDED HISTORY:  vent maintenance  Reason for Exam: vent maintenance    FINDINGS:  The cardiomediastinal silhouette is stable in size and contour.  Support  devices appears stable in position.  The lungs are grossly clear.  No new or  progressive airspace disease identified.    No pleural effusion or pneumothorax.    No significant interval change.    Impression  No significant interval change.       MRI Result (most recent):  No results found for this or any previous visit from the past 3650 days.      No results found for this or any previous visit.       Encounter Date: 08/09/24   EKG 12 Lead   Result Value    Ventricular Rate 60    Atrial Rate 60    P-R Interval 190    QRS Duration 82    Q-T Interval 450    QTc Calculation (Bazett) 450    R Axis 76    T Axis 84    Narrative    Normal sinus rhythm  Low voltage QRS  Cannot rule out Anteroseptal infarct (cited on or before 15-JUL-2024)  Abnormal ECG  When compared with ECG of 09-AUG-2024 19:05,  Premature atrial complexes are no longer Present        Code Status: Full Code     ADVANCED CARE PLANNING:  Patient has capacity for medical decisions: no  Health Care Power of :

## 2024-08-12 NOTE — PLAN OF CARE
Problem: Respiratory - Adult  Goal: Achieves optimal ventilation and oxygenation  8/12/2024 0724 by Kathy Bales RCP  Outcome: Progressing  8/12/2024 0500 by Mercedes Calderon RN  Outcome: Progressing  Flowsheets  Taken 8/12/2024 0500 by Mercedes Calderon RN  Achieves optimal ventilation and oxygenation:   Assess for changes in respiratory status   Assess for changes in mentation and behavior   Position to facilitate oxygenation and minimize respiratory effort   Oxygen supplementation based on oxygen saturation or arterial blood gases  Taken 8/12/2024 0313 by Mireya Sánchez RCP  Achieves optimal ventilation and oxygenation:   Assess for changes in respiratory status   Oxygen supplementation based on oxygen saturation or arterial blood gases   Assess the need for suctioning and aspirate as needed   Respiratory therapy support as indicated  Taken 8/11/2024 2355 by Mireya Sánchez RCP  Achieves optimal ventilation and oxygenation:   Assess for changes in respiratory status   Position to facilitate oxygenation and minimize respiratory effort   Assess the need for suctioning and aspirate as needed   Respiratory therapy support as indicated  Taken 8/11/2024 1948 by Mireya Sánchez RCP  Achieves optimal ventilation and oxygenation:   Assess for changes in respiratory status   Oxygen supplementation based on oxygen saturation or arterial blood gases   Assess the need for suctioning and aspirate as needed   Respiratory therapy support as indicated  8/11/2024 1947 by Mireya Sánchez RCP  Outcome: Progressing  Goal: Able to breathe comfortably  8/12/2024 0724 by Kathy Bales RCP  Outcome: Progressing  8/11/2024 1947 by Mireya Sánchez RCP  Outcome: Progressing  Goal: Patient's breath sounds will be clear and equal  8/12/2024 0724 by Kathy Bales RCP  Outcome: Progressing  8/11/2024 1947 by Mireya Sánchez RCP  Outcome: Progressing  Goal: Adequate oxygenation  Description: Adequate

## 2024-08-12 NOTE — CONSULTS
Reason for Consult: Bradycardia  Requesting Physician: Reza, Mohsin Mohammad, MD    CHIEF COMPLAINT:  \" They are taking off his sedation\"    History Obtained From:  spouse, electronic medical record    HISTORY OF PRESENT ILLNESS:      The patient is a 73 y.o. male with significant past medical history of cirrhosis, gastric varices with prior hemorrhagic shock, COPD, cardiomyopathy EF 40 to 45%.  He is admitted with worsened dyspnea  and diagnosed with acute COPD exacerbation for which she was ultimately intubated yesterday.  Placed on pressors for shock..    Cardiology called for bradycardia.  And overnight hours he is not predominantly in sinus bradycardia in the 40s to 50s beats per minute.  This has improved over the last several hours to 70s beats per minute.  Occasional PACs, no nonconducted P waves or episodes of heart block.    Family members at bedside.    Past Medical History:    Past Medical History:   Diagnosis Date    Arthritis     Cirrhosis (HCC)     Emphysema lung (HCC)     GI bleed     Hypertension     MI (myocardial infarction) (HCC)      Past Surgical History:    History reviewed. No pertinent surgical history.  Home Medications:  Prior to Admission medications    Medication Sig Start Date End Date Taking? Authorizing Provider   lactulose (CHRONULAC) 10 GM/15ML solution Take 30 mLs by mouth 3 times daily 5/16/23  Yes Nicholas White MD   albuterol (PROVENTIL) (2.5 MG/3ML) 0.083% nebulizer solution Inhale 3 mLs into the lungs 3 times daily 2/22/24  Yes ProviderNicholas MD   loratadine (CLARITIN) 10 MG tablet Take 1 tablet by mouth daily as needed 5/17/23  Yes Nicholas White MD   famotidine (PEPCID) 40 MG tablet Take 1 tablet by mouth every evening   Yes Nicholas White MD   rifAXIMin (XIFAXAN) 550 MG tablet Take 1 tablet by mouth in the morning and 1 tablet in the evening. 9/13/23 9/7/24  Nicholas White MD   spironolactone (ALDACTONE) 25 MG tablet Take 1 tablet

## 2024-08-12 NOTE — PROGRESS NOTES
Gastroenterology Progress Note      Patient:   Aiden Black   :    1950   Facility:   [facility]   Date:     2024  Consultant:   CHLOE MURILLO      Subjective:     Patient seen and examined.   Lamont being weaned, remains on vent.   No BM remains on lactulose via OG, no evidence of upper GI bleeding.   AFP 2.1  Liver US radiology read pending.     Objective:   Vital Signs:  /63   Pulse 74   Temp 97.7 °F (36.5 °C) (Oral)   Resp 16   Ht 1.803 m (5' 11\")   Wt 90.7 kg (200 lb)   SpO2 99%   BMI 27.89 kg/m²      Physical Exam:   General:  sedated on vent     HEENT:  OG tube in situ, ET tube in situ     Chest: diminished bases, no crackles     Cardiovascular: S1S2, bradycardic     Abdomen:  soft, ND present bs     Skin/Musculoskeltal/Ext:  No jaundice. No +clubbing, no cyanosis, no pitting edema.  ROM not assessed.  + liver stigmata.      Lab and Imaging Review   CBC:   Lab Results   Component Value Date/Time    WBC 11.3 2024 05:32 AM    RBC 3.39 2024 05:32 AM    RBC 3.99 2024 01:15 PM    HGB 11.9 2024 05:32 AM    HCT 35.9 2024 05:32 AM    .9 2024 05:32 AM    MCH 35.1 2024 05:32 AM    MCHC 33.1 2024 05:32 AM    RDW 14.5 2024 05:32 AM    PLT 70 2024 05:32 AM    MPV 10.5 2024 05:32 AM     Platelets:    Lab Results   Component Value Date/Time    PLT 70 2024 05:32 AM     Hemoglobin/Hematocrit:    Lab Results   Component Value Date/Time    HGB 11.9 2024 05:32 AM    HCT 35.9 2024 05:32 AM     BMP:    Lab Results   Component Value Date/Time     2024 05:32 AM    K 3.9 2024 05:32 AM     2024 05:32 AM    CO2 20 2024 05:32 AM    BUN 24 2024 05:32 AM    CREATININE 0.8 2024 05:32 AM    CALCIUM 7.4 2024 05:32 AM    GFRAA 72.1 07/15/2024 10:34 AM    LABGLOM >90 2024 05:32 AM    LABGLOM >60 2023 08:30 AM    GLUCOSE 448 2024  05:32 AM    GLUCOSE 121 07/15/2024 10:34 AM     Sodium:    Lab Results   Component Value Date/Time     08/12/2024 05:32 AM     BUN/Creatinine:    Lab Results   Component Value Date/Time    BUN 24 08/12/2024 05:32 AM    CREATININE 0.8 08/12/2024 05:32 AM     Hepatic Function Panel:    Lab Results   Component Value Date/Time    ALKPHOS 51 08/12/2024 05:32 AM    ALT 16 08/12/2024 05:32 AM    AST 18 08/12/2024 05:32 AM    BILITOT 1.5 08/12/2024 05:32 AM    BILIDIR 0.7 08/10/2024 12:23 PM     PT/INR:    Lab Results   Component Value Date/Time    PROTIME 16.8 08/10/2024 12:23 PM    INR 1.4 08/10/2024 12:23 PM         Assessment/Plan:   74 yo male admitted with SOB, fatigue, hypoxia intubated.   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   Last EGD May 2023 +gastric varices, no esophageal varices.      OG without current evidence of bleeding from such, no melena or hematochezia, hgb      Ok to use for medications, would hold on tube feeds for now. Initial placement may have been contact/tube trauma. If varices were bleeding would expect significant evidence of blood loss, which we are not currently seeing.      Continue BID IV PPI.     Please update GI immediately if any signs of blood loss.      F/u US radiology read.   AFP normal.      If bleeding should occur or worsen, EGD will be required.  If the source of bleeding is gastric varices, there is no reliable endoscopic recourse available.  Transfer for TIPS procedure would be necessary.     This plan was formulated in collaboration with CHLOE Heck  12:25 PM  8/12/2024

## 2024-08-12 NOTE — PROGRESS NOTES
End Of Shift Note  [facility]  Summary of shift: Patient has been off sedation since 1030. C-pap trial attempted but patient was still sedated. NS is running @ 50 mL/hr, Levo @ 4 mcg/minute. Patient has had no residuals at q4 checks. Family has been at bedside throughout the entire shift. Urine output was 800 mL for the shift.     Vitals:    Vitals:    08/12/24 1910 08/12/24 1915 08/12/24 1919 08/12/24 1930   BP:       Pulse: 63 60 69 63   Resp: 16 16 16 16   Temp:       TempSrc:       SpO2: 99% 100% 100% 99%   Weight:       Height:            I&O:   Intake/Output Summary (Last 24 hours) at 8/12/2024 1942  Last data filed at 8/12/2024 1535  Gross per 24 hour   Intake 168.96 ml   Output 1275 ml   Net -1106.04 ml       Resp Status: Room Air    Ventilator Settings:  Vent Mode: AC/PRVC Resp Rate (Set): 16 bpm/Vt (Set, mL): 550 mL/ /FiO2 : 30 %    Critical Care IV infusions:   sodium chloride 50 mL/hr at 08/12/24 0830    dextrose      sodium chloride 10 mL/hr at 08/11/24 0628    midazolam 4 mg/hr (08/12/24 0450)    phenylephrine (REMI-SYNEPHRINE) 50 mg in sodium chloride 0.9 % 250 mL infusion 70 mcg/min (08/12/24 0450)    norepinephrine 5 mcg/min (08/12/24 1029)        LDA:   PICC 08/10/24 Left Brachial (Active)   Number of days: 2       Peripheral IV 08/09/24 Right Forearm (Active)   Number of days: 3       Peripheral IV 08/10/24 Distal;Left;Anterior Forearm (Active)   Number of days: 2       NG/OG/NJ/NE Tube Orogastric Center mouth (Active)   Number of days: 2       Urinary Catheter 08/10/24 Roca (Active)   Number of days: 2       ETT  (Active)   Number of days: 2       Arterial Line 08/10/24 Right Radial (Active)   Number of days: 2

## 2024-08-12 NOTE — ACP (ADVANCE CARE PLANNING)
..Advance Care Planning     Advance Care Planning (ACP) Physician/NP/PA Conversation    Date of Conversation: 8/9/2024  Conducted with: Legal next of kin  Other persons present: Spouse Katrin    Healthcare Decision Maker:   Primary Decision Maker: Katrin Black - Spouse - [phone]    Secondary Decision Maker: Anuel Black - Child - [phone]    Secondary Decision Maker: Richi Black - Child - [phone]     Today we documented Decision Maker(s) consistent with Legal Next of Kin hierarchy.    Care Preferences:    Hospitalization:  \"If your health worsens and it becomes clear that your chance of recovery is unlikely, what would be your preference regarding hospitalization?\"  The patient would prefer hospitalization.    Ventilation:  \"If you were unable to breath on your own and your chance of recovery was unlikely, what would be your preference about the use of a ventilator (breathing machine) if it was available to you?\"  The patient would desire the use of a ventilator.    Resuscitation:  \"In the event your heart stopped as a result of an underlying serious health condition, would you want attempts made to restart your heart, or would you prefer a natural death?\"  No, do NOT attempt to resuscitate.    treatment goals, benefit/burden of treatment options, ventilation preferences, hospitalization preferences, resuscitation preferences, and hospice care    Conversation Outcomes / Follow-Up Plan:  ACP in process - information provided, considering goals and options  Reviewed DNR/DNI and patient elects DNR order - completed portable DNR form & placed order    Length of Voluntary ACP Conversation in minutes:  <16 minutes (Non-Billable)    SHIRLEY YI, APRN - CNP

## 2024-08-12 NOTE — PROGRESS NOTES
Pulmonary Critical Care Progress Note    Patient seen for the follow up of COPD exacerbation (HCC)     Subjective:    He had bradycardia heart  rate down to 40 on Neosynphrine at 75 mcg/ hr  . I was contacted and came in to evaluate I ordered EKG showed low voltage possible old anterior infarct.  He has been on the ventilator intubated sedated on Versed at 6 mg.  He previously got agitated on decreasing sedation yesterday was biting on tube.  OG was inserted yesterday per GI progression and there was blood clots in the OG tube insertion.  He was not started  and tube feeds for that reason per GI.  Lactulose restarted.  He is off Coreg home dose.  He has increased blood sugar    Examination:    Vitals: /63   Pulse 74   Temp 97.7 °F (36.5 °C) (Oral)   Resp 16   Ht 1.803 m (5' 11\")   Wt 90.7 kg (200 lb)   SpO2 99%   BMI 27.89 kg/m²   SpO2  Av.4 %  Min: 97 %  Max: 100 %  General appearance: Intubated sedated unresponsive  Neck: No JVD  Lungs: Decreased breath sound no crackles or wheeze  Heart: regular rate and rhythm, S1, S2 normal, no gallop  Abdomen: Soft, non tender, + BS  Extremities: no cyanosis or clubbing. No significant edema    LABs:    CBC:   Recent Labs     24  0532   WBC 13.7* 11.3   HGB 14.0 11.9*   HCT 39.7* 35.9*   PLT 91* 70*     BMP:   Recent Labs     24  04224  1100 24  1659 24  0532     --   --  138   K 3.5*   < > 4.4 3.9   CO2 22  --   --  20   BUN 27*  --   --  24*   CREATININE 1.0  --   --  0.8   LABGLOM 78  --   --  >90   GLUCOSE 137*  --   --  448*    < > = values in this interval not displayed.     PT/INR:   Recent Labs     08/10/24  1223   PROTIME 16.8*   INR 1.4     APTT:  Recent Labs     08/10/24  1223   APTT 25.2     LIVER PROFILE:  Recent Labs     24  0532   AST 19 18   ALT 17 16      Latest Reference Range & Units 24 05:18   POC pH 7.350 - 7.450  7.405   POC pCO2 35.0 - 48.0 mm Hg 36.1   POC

## 2024-08-12 NOTE — PLAN OF CARE
Problem: Discharge Planning  Goal: Discharge to home or other facility with appropriate resources  Outcome: Progressing  Flowsheets (Taken 8/12/2024 0500)  Discharge to home or other facility with appropriate resources:   Identify barriers to discharge with patient and caregiver   Arrange for needed discharge resources and transportation as appropriate   Identify discharge learning needs (meds, wound care, etc)   Arrange for interpreters to assist at discharge as needed     Problem: Safety - Medical Restraint  Goal: Remains free of injury from restraints (Restraint for Interference with Medical Device)  Description: INTERVENTIONS:  1. Determine that other, less restrictive measures have been tried or would not be effective before applying the restraint  2. Evaluate the patient's condition at the time of restraint application  3. Inform patient/family regarding the reason for restraint  4. Q2H: Monitor safety, psychosocial status, comfort, nutrition and hydration  Outcome: Progressing  Flowsheets  Taken 8/12/2024 0500 by Mercedes Calderon RN  Remains free of injury from restraints (restraint for interference with medical device):   Determine that other, less restrictive measures have been tried or would not be effective before applying the restraint   Evaluate the patient's condition at the time of restraint application   Inform patient/family regarding the reason for restraint   Every 2 hours: Monitor safety, psychosocial status, comfort, nutrition and hydration    Problem: Safety - Adult  Goal: Free from fall injury  Outcome: Progressing  Flowsheets (Taken 8/12/2024 0500)  Free From Fall Injury:   Instruct family/caregiver on patient safety   Based on caregiver fall risk screen, instruct family/caregiver to ask for assistance with transferring infant if caregiver noted to have fall risk factors     Problem: Respiratory - Adult  Goal: Achieves optimal ventilation and oxygenation  8/12/2024 0500 by Mercedes Calderon  RN  Outcome: Progressing  Flowsheets  Taken 8/12/2024 0500 by Mercedes Calderon RN  Achieves optimal ventilation and oxygenation:   Assess for changes in respiratory status   Assess for changes in mentation and behavior   Position to facilitate oxygenation and minimize respiratory effort   Oxygen supplementation based on oxygen saturation or arterial blood gases    Problem: Skin/Tissue Integrity  Goal: Absence of new skin breakdown  Description: 1.  Monitor for areas of redness and/or skin breakdown  2.  Assess vascular access sites hourly  3.  Every 4-6 hours minimum:  Change oxygen saturation probe site  4.  Every 4-6 hours:  If on nasal continuous positive airway pressure, respiratory therapy assess nares and determine need for appliance change or resting period.  Outcome: Progressing     Problem: ABCDS Injury Assessment  Goal: Absence of physical injury  Outcome: Progressing  Flowsheets (Taken 8/12/2024 0500)  Absence of Physical Injury: Implement safety measures based on patient assessment     Problem: Pain  Goal: Verbalizes/displays adequate comfort level or baseline comfort level  Outcome: Progressing  Flowsheets (Taken 8/12/2024 0500)  Verbalizes/displays adequate comfort level or baseline comfort level:   Encourage patient to monitor pain and request assistance   Assess pain using appropriate pain scale   Administer analgesics based on type and severity of pain and evaluate response   Implement non-pharmacological measures as appropriate and evaluate response

## 2024-08-12 NOTE — CARE COORDINATION
Discharge planning     Remains intubated. CPAP trial today.  On sammy and versed. Has NG. Hx of cirrhosis. Fell at home 2 weeks ago. Probable SNF vs HC. Has had both in the past. PT/OT to eval when appropriate.

## 2024-08-12 NOTE — PROGRESS NOTES
Progress note  [facility]    Adult Hospitalist      Name: Aiden Black  MRN: [id number]     Acct: [id number]  Room: [address] [place] [address]    Admit Date: 8/9/2024  6:45 PM  PCP: Angelica Cruz MD    Primary Problem  Principal Problem:    COPD exacerbation (HCC)  Active Problems:    Hypoxia  Resolved Problems:    * No resolved hospital problems. *        Assesment/ plan:     Patient is admitted to ICU  Cardiac monitoring  Patient intubated  IV sedation   IV pressor      Acute COPD exacerbation  IV Solu-Medrol  Duo nebs   Respiratory pathogen panel negative  Sputum culture   Empiric IV ceftriaxone and Zithromax  Consult pulmonology - appreciate recs    Acute hypoxic and hypercarbic respiratory failure   Secondary to above   Mechanical ventilation sedation as per Critical Care   MRSA PCR negative  Critical care following    Shock, etiology unclear  Monitor blood pressure   Initially sammy and now levophed.   IV fluids  Lactate 1.7  Procalcitonin 0.08  Blood culture pending  Sputum culture    Hyperglycemia  - IV fluids switched to normal saline  - 400s-->122    Cirrhosis  Patient has had history of esophageal varices  Patient has OG tube.  Monitor for bleeding  PPI twice a day  Monitor LFTs  Check ammonia  Currently on lactulose and Xifaxan   Liver ultrasound pending  GI consult  AFP 2.1  H/H - 11.9  PPI BID    Goals of care  - palliative care consulted as family is not clear, if patient ever wanted to undergo all these procedures.     Continue to monitor/telemetry/CBC with differential daily/BMP daily  DVT and GI prophylaxis.  Continue medications as below      Scheduled Meds:   insulin lispro  0-4 Units SubCUTAneous TID WC    insulin lispro  0-4 Units SubCUTAneous Nightly    midodrine  5 mg Oral TID WC    lactulose  30 g Oral TID    arformoterol tartrate  15 mcg Nebulization BID RT    ipratropium 0.5 mg-albuterol 2.5 mg  1 Dose Inhalation 4x Daily RT    sodium chloride flush  5-40 mL IntraVENous 2 times per day     8/10/2024  1. Endotracheal tube in satisfactory position. 2. No acute cardiopulmonary process.     CT HEAD WO CONTRAST    Result Date: 8/10/2024  No acute intracranial abnormality.     XR CHEST PORTABLE    Result Date: 8/9/2024  No acute cardiopulmonary disease.         All radiological studies reviewed                Code Status:  Full Code    Electronically signed by Mohsin Mohammad Reza, MD on 8/12/2024 at 12:03 PM     Copy sent to Angelica Jaimes MD    This note was created with the assistance of a speech-recognition program.  Although the intention is to generate a document that actually reflects the content of the visit, no guarantees can be provided that every mistake has been identified and corrected by editing.     Note was updated later by me after  physical examination and  completion of the assessment.

## 2024-08-12 NOTE — PROGRESS NOTES
End Of Shift Note  [facility]  Summary of shift: pt had uneventful shift. Remains on vent, remi weaned to 70mcg/min, versed infusing at 6mg/hr, glu >400 this am see insulin orders and d5 dc'd, NS 50ml/hr started.. Pt to be weaned on sedation today and have a cpap trial. Pt is not purposefully following commands, will open eyes and trace room. UO increased since Friday. No bm (still receiving lactulose tid, wife states last bm was probably 8/8 ammonia >60)    Vitals:    Vitals:    08/12/24 0751 08/12/24 0800 08/12/24 0815 08/12/24 0830   BP:       Pulse:  59 61 60   Resp:  16 16 16   Temp: 97.6 °F (36.4 °C)      TempSrc: Oral      SpO2:  100% 99% 99%   Weight:       Height:            I&O:   Intake/Output Summary (Last 24 hours) at 8/12/2024 0836  Last data filed at 8/12/2024 0630  Gross per 24 hour   Intake 148.96 ml   Output 475 ml   Net -326.04 ml       Resp Status: vent    Ventilator Settings:  Vent Mode: AC/PRVC Resp Rate (Set): 16 bpm/Vt (Set, mL): 550 mL/ /FiO2 : 30 %    Critical Care IV infusions:   sodium chloride 50 mL/hr at 08/12/24 0830    dextrose      sodium chloride 10 mL/hr at 08/11/24 0628    midazolam 4 mg/hr (08/12/24 0450)    phenylephrine (REMI-SYNEPHRINE) 50 mg in sodium chloride 0.9 % 250 mL infusion 70 mcg/min (08/12/24 0450)    norepinephrine Stopped (08/10/24 1045)        LDA:   PICC 08/10/24 Left Brachial (Active)   Number of days: 1       Peripheral IV 08/09/24 Right Forearm (Active)   Number of days: 2       Peripheral IV 08/10/24 Distal;Left;Anterior Forearm (Active)   Number of days: 2       NG/OG/NJ/NE Tube Orogastric Center mouth (Active)   Number of days: 1       Urinary Catheter 08/10/24 Roca (Active)   Number of days: 2       ETT  (Active)   Number of days: 2       Arterial Line 08/10/24 Right Radial (Active)   Number of days: 2

## 2024-08-12 NOTE — PLAN OF CARE
Problem: Discharge Planning  Goal: Discharge to home or other facility with appropriate resources  8/12/2024 1408 by Maggy Polk RN  Outcome: Progressing  Flowsheets (Taken 8/12/2024 0800)  Discharge to home or other facility with appropriate resources: Identify barriers to discharge with patient and caregiver  8/12/2024 0500 by Mercedes Calderon RN  Outcome: Progressing  Flowsheets (Taken 8/12/2024 0500)  Discharge to home or other facility with appropriate resources:   Identify barriers to discharge with patient and caregiver   Arrange for needed discharge resources and transportation as appropriate   Identify discharge learning needs (meds, wound care, etc)   Arrange for interpreters to assist at discharge as needed     Problem: Safety - Medical Restraint  Goal: Remains free of injury from restraints (Restraint for Interference with Medical Device)  Description: INTERVENTIONS:  1. Determine that other, less restrictive measures have been tried or would not be effective before applying the restraint  2. Evaluate the patient's condition at the time of restraint application  3. Inform patient/family regarding the reason for restraint  4. Q2H: Monitor safety, psychosocial status, comfort, nutrition and hydration  8/12/2024 1408 by Maggy Polk, RN  Outcome: Progressing  Flowsheets  Taken 8/12/2024 1401  Remains free of injury from restraints (restraint for interference with medical device):   Determine that other, less restrictive measures have been tried or would not be effective before applying the restraint   Evaluate the patient's condition at the time of restraint application   Inform patient/family regarding the reason for restraint   Every 2 hours: Monitor safety, psychosocial status, comfort, nutrition and hydration  Taken 8/12/2024 1400  Remains free of injury from restraints (restraint for interference with medical device):   Determine that other, less restrictive measures have been tried or would not

## 2024-08-12 NOTE — PROGRESS NOTES
Occupational Therapy  [facility]  Occupational Therapy Not Seen Note    Patient not available for Occupational Therapy due to:    [] Testing:    [] Hemodialysis    [] Cancelled by RN:    []Refusal by Patient:    [] Surgery:     [] Intubation:     [] Pain Medication:    [] Sedation:     [] Spine Precautions :    [] Medical Instability:    [x] Other:8/12: cx eval as pt intubated/not appropriate for skilled OT at this time

## 2024-08-13 ENCOUNTER — APPOINTMENT (OUTPATIENT)
Dept: GENERAL RADIOLOGY | Age: 74
End: 2024-08-13
Payer: MEDICARE

## 2024-08-13 ENCOUNTER — APPOINTMENT (OUTPATIENT)
Age: 74
End: 2024-08-13
Attending: INTERNAL MEDICINE
Payer: MEDICARE

## 2024-08-13 LAB
ALBUMIN SERPL-MCNC: 3.1 G/DL (ref 3.5–5.2)
ALLEN TEST: ABNORMAL
ALP SERPL-CCNC: 59 U/L (ref 40–129)
ALT SERPL-CCNC: 55 U/L (ref 5–41)
AMMONIA PLAS-SCNC: 58 UMOL/L (ref 16–60)
ANION GAP SERPL CALCULATED.3IONS-SCNC: 12 MMOL/L (ref 9–17)
AST SERPL-CCNC: 54 U/L
BASOPHILS # BLD: <0.03 K/UL (ref 0–0.2)
BASOPHILS NFR BLD: 0 % (ref 0–2)
BILIRUB SERPL-MCNC: 1.6 MG/DL (ref 0.3–1.2)
BNP SERPL-MCNC: 275 PG/ML
BUN SERPL-MCNC: 35 MG/DL (ref 8–23)
BUN/CREAT SERPL: 29 (ref 9–20)
CALCIUM SERPL-MCNC: 8.7 MG/DL (ref 8.6–10.4)
CHLORIDE SERPL-SCNC: 111 MMOL/L (ref 98–107)
CO2 SERPL-SCNC: 20 MMOL/L (ref 20–31)
CREAT SERPL-MCNC: 1.2 MG/DL (ref 0.7–1.2)
ECHO AO ROOT DIAM: 3.1 CM
ECHO AO ROOT INDEX: 1.47 CM/M2
ECHO AV PEAK GRADIENT: 6 MMHG
ECHO AV PEAK VELOCITY: 1.2 M/S
ECHO BSA: 2.13 M2
ECHO LA AREA 4C: 15.1 CM2
ECHO LA DIAMETER INDEX: 1.8 CM/M2
ECHO LA DIAMETER: 3.8 CM
ECHO LA MAJOR AXIS: 4.7 CM
ECHO LA TO AORTIC ROOT RATIO: 1.23
ECHO LA VOL MOD A4C: 40 ML (ref 18–58)
ECHO LA VOLUME INDEX MOD A4C: 19 ML/M2 (ref 16–34)
ECHO LV E' LATERAL VELOCITY: 6 CM/S
ECHO LV E' SEPTAL VELOCITY: 5 CM/S
ECHO LV FRACTIONAL SHORTENING: 32 % (ref 28–44)
ECHO LV INTERNAL DIMENSION DIASTOLE INDEX: 2.37 CM/M2
ECHO LV INTERNAL DIMENSION DIASTOLIC: 5 CM (ref 4.2–5.9)
ECHO LV INTERNAL DIMENSION SYSTOLIC INDEX: 1.61 CM/M2
ECHO LV INTERNAL DIMENSION SYSTOLIC: 3.4 CM
ECHO LV IVSD: 0.9 CM (ref 0.6–1)
ECHO LV MASS 2D: 158.2 G (ref 88–224)
ECHO LV MASS INDEX 2D: 75 G/M2 (ref 49–115)
ECHO LV POSTERIOR WALL DIASTOLIC: 0.9 CM (ref 0.6–1)
ECHO LV RELATIVE WALL THICKNESS RATIO: 0.36
ECHO MV A VELOCITY: 0.74 M/S
ECHO MV E DECELERATION TIME (DT): 246 MS
ECHO MV E VELOCITY: 0.67 M/S
ECHO MV E/A RATIO: 0.91
ECHO MV E/E' LATERAL: 11.17
ECHO MV E/E' RATIO (AVERAGED): 12.28
ECHO MV E/E' SEPTAL: 13.4
EOSINOPHIL # BLD: <0.03 K/UL (ref 0–0.44)
EOSINOPHILS RELATIVE PERCENT: 0 % (ref 1–4)
ERYTHROCYTE [DISTWIDTH] IN BLOOD BY AUTOMATED COUNT: 14.2 % (ref 11.8–14.4)
FIO2: 30
GFR, ESTIMATED: 63 ML/MIN/1.73M2
GLUCOSE BLD-MCNC: 110 MG/DL (ref 75–110)
GLUCOSE BLD-MCNC: 111 MG/DL (ref 75–110)
GLUCOSE BLD-MCNC: 115 MG/DL (ref 75–110)
GLUCOSE BLD-MCNC: 123 MG/DL (ref 75–110)
GLUCOSE BLD-MCNC: 127 MG/DL (ref 75–110)
GLUCOSE BLD-MCNC: 132 MG/DL (ref 75–110)
GLUCOSE SERPL-MCNC: 129 MG/DL (ref 70–99)
HCT VFR BLD AUTO: 39.1 % (ref 40.7–50.3)
HGB BLD-MCNC: 13.3 G/DL (ref 13–17)
IMM GRANULOCYTES # BLD AUTO: 0.03 K/UL (ref 0–0.3)
IMM GRANULOCYTES NFR BLD: 0 %
LYMPHOCYTES NFR BLD: 0.72 K/UL (ref 1.1–3.7)
LYMPHOCYTES RELATIVE PERCENT: 10 % (ref 24–43)
MCH RBC QN AUTO: 35 PG (ref 25.2–33.5)
MCHC RBC AUTO-ENTMCNC: 34 G/DL (ref 28.4–34.8)
MCV RBC AUTO: 102.9 FL (ref 82.6–102.9)
MODE: ABNORMAL
MONOCYTES NFR BLD: 0.21 K/UL (ref 0.1–1.2)
MONOCYTES NFR BLD: 3 % (ref 3–12)
NEGATIVE BASE EXCESS, ART: 3.9 MMOL/L (ref 0–2)
NEUTROPHILS NFR BLD: 87 % (ref 36–65)
NEUTS SEG NFR BLD: 6.43 K/UL (ref 1.5–8.1)
NRBC BLD-RTO: 0 PER 100 WBC
O2 DELIVERY DEVICE: ABNORMAL
PLATELET # BLD AUTO: 56 K/UL (ref 138–453)
PMV BLD AUTO: 10.3 FL (ref 8.1–13.5)
POC HCO3: 21.2 MMOL/L (ref 21–28)
POC O2 SATURATION: 99 % (ref 94–98)
POC PCO2: 37.9 MM HG (ref 35–48)
POC PH: 7.36 (ref 7.35–7.45)
POC PO2: 137.8 MM HG (ref 83–108)
POTASSIUM SERPL-SCNC: 4.6 MMOL/L (ref 3.7–5.3)
PROT SERPL-MCNC: 5.5 G/DL (ref 6.4–8.3)
RBC # BLD AUTO: 3.8 M/UL (ref 4.21–5.77)
SAMPLE SITE: ABNORMAL
SODIUM SERPL-SCNC: 143 MMOL/L (ref 135–144)
WBC OTHER # BLD: 7.4 K/UL (ref 3.5–11.3)

## 2024-08-13 PROCEDURE — 2700000000 HC OXYGEN THERAPY PER DAY

## 2024-08-13 PROCEDURE — 6370000000 HC RX 637 (ALT 250 FOR IP): Performed by: FAMILY MEDICINE

## 2024-08-13 PROCEDURE — 6370000000 HC RX 637 (ALT 250 FOR IP): Performed by: INTERNAL MEDICINE

## 2024-08-13 PROCEDURE — 2580000003 HC RX 258: Performed by: NURSE PRACTITIONER

## 2024-08-13 PROCEDURE — 94640 AIRWAY INHALATION TREATMENT: CPT

## 2024-08-13 PROCEDURE — 36415 COLL VENOUS BLD VENIPUNCTURE: CPT

## 2024-08-13 PROCEDURE — 85025 COMPLETE CBC W/AUTO DIFF WBC: CPT

## 2024-08-13 PROCEDURE — 83880 ASSAY OF NATRIURETIC PEPTIDE: CPT

## 2024-08-13 PROCEDURE — 94761 N-INVAS EAR/PLS OXIMETRY MLT: CPT

## 2024-08-13 PROCEDURE — 80053 COMPREHEN METABOLIC PANEL: CPT

## 2024-08-13 PROCEDURE — 82140 ASSAY OF AMMONIA: CPT

## 2024-08-13 PROCEDURE — 71045 X-RAY EXAM CHEST 1 VIEW: CPT

## 2024-08-13 PROCEDURE — 6360000002 HC RX W HCPCS: Performed by: INTERNAL MEDICINE

## 2024-08-13 PROCEDURE — 2000000000 HC ICU R&B

## 2024-08-13 PROCEDURE — 82947 ASSAY GLUCOSE BLOOD QUANT: CPT

## 2024-08-13 PROCEDURE — 2580000003 HC RX 258: Performed by: INTERNAL MEDICINE

## 2024-08-13 PROCEDURE — 94003 VENT MGMT INPAT SUBQ DAY: CPT

## 2024-08-13 PROCEDURE — 82803 BLOOD GASES ANY COMBINATION: CPT

## 2024-08-13 PROCEDURE — 93306 TTE W/DOPPLER COMPLETE: CPT

## 2024-08-13 RX ORDER — SODIUM CHLORIDE, SODIUM LACTATE, POTASSIUM CHLORIDE, AND CALCIUM CHLORIDE .6; .31; .03; .02 G/100ML; G/100ML; G/100ML; G/100ML
500 INJECTION, SOLUTION INTRAVENOUS ONCE
Status: COMPLETED | OUTPATIENT
Start: 2024-08-13 | End: 2024-08-13

## 2024-08-13 RX ORDER — INSULIN LISPRO 100 [IU]/ML
0-4 INJECTION, SOLUTION INTRAVENOUS; SUBCUTANEOUS EVERY 6 HOURS
Status: DISCONTINUED | OUTPATIENT
Start: 2024-08-13 | End: 2024-08-21

## 2024-08-13 RX ADMIN — WATER 125 MG: 1 INJECTION INTRAMUSCULAR; INTRAVENOUS; SUBCUTANEOUS at 03:39

## 2024-08-13 RX ADMIN — SODIUM CHLORIDE, POTASSIUM CHLORIDE, SODIUM LACTATE AND CALCIUM CHLORIDE 500 ML: 600; 310; 30; 20 INJECTION, SOLUTION INTRAVENOUS at 18:18

## 2024-08-13 RX ADMIN — WATER 1000 MG: 1 INJECTION INTRAMUSCULAR; INTRAVENOUS; SUBCUTANEOUS at 02:15

## 2024-08-13 RX ADMIN — SODIUM CHLORIDE, POTASSIUM CHLORIDE, SODIUM LACTATE AND CALCIUM CHLORIDE 500 ML: 600; 310; 30; 20 INJECTION, SOLUTION INTRAVENOUS at 13:00

## 2024-08-13 RX ADMIN — SODIUM CHLORIDE, PRESERVATIVE FREE 10 ML: 5 INJECTION INTRAVENOUS at 22:01

## 2024-08-13 RX ADMIN — ARFORMOTEROL TARTRATE 15 MCG: 15 SOLUTION RESPIRATORY (INHALATION) at 19:28

## 2024-08-13 RX ADMIN — AZITHROMYCIN MONOHYDRATE 500 MG: 500 INJECTION, POWDER, LYOPHILIZED, FOR SOLUTION INTRAVENOUS at 02:15

## 2024-08-13 RX ADMIN — IPRATROPIUM BROMIDE AND ALBUTEROL SULFATE 1 DOSE: 2.5; .5 SOLUTION RESPIRATORY (INHALATION) at 19:28

## 2024-08-13 RX ADMIN — RIFAXIMIN 550 MG: 550 TABLET ORAL at 22:01

## 2024-08-13 RX ADMIN — IPRATROPIUM BROMIDE AND ALBUTEROL SULFATE 1 DOSE: 2.5; .5 SOLUTION RESPIRATORY (INHALATION) at 11:03

## 2024-08-13 RX ADMIN — LACTULOSE 30 G: 20 SOLUTION ORAL at 12:41

## 2024-08-13 RX ADMIN — LACTULOSE 30 G: 20 SOLUTION ORAL at 08:16

## 2024-08-13 RX ADMIN — MIDODRINE HYDROCHLORIDE 5 MG: 5 TABLET ORAL at 12:41

## 2024-08-13 RX ADMIN — MIDODRINE HYDROCHLORIDE 5 MG: 5 TABLET ORAL at 18:20

## 2024-08-13 RX ADMIN — BUDESONIDE 500 MCG: 0.5 SUSPENSION RESPIRATORY (INHALATION) at 19:28

## 2024-08-13 RX ADMIN — ARFORMOTEROL TARTRATE 15 MCG: 15 SOLUTION RESPIRATORY (INHALATION) at 08:00

## 2024-08-13 RX ADMIN — SODIUM CHLORIDE, PRESERVATIVE FREE 10 ML: 5 INJECTION INTRAVENOUS at 08:17

## 2024-08-13 RX ADMIN — CETIRIZINE HYDROCHLORIDE 10 MG: 10 TABLET, FILM COATED ORAL at 08:16

## 2024-08-13 RX ADMIN — WATER 60 MG: 1 INJECTION INTRAMUSCULAR; INTRAVENOUS; SUBCUTANEOUS at 12:40

## 2024-08-13 RX ADMIN — LACTULOSE 30 G: 20 SOLUTION ORAL at 22:01

## 2024-08-13 RX ADMIN — WATER 60 MG: 1 INJECTION INTRAMUSCULAR; INTRAVENOUS; SUBCUTANEOUS at 18:20

## 2024-08-13 RX ADMIN — SODIUM CHLORIDE, PRESERVATIVE FREE 40 MG: 5 INJECTION INTRAVENOUS at 08:17

## 2024-08-13 RX ADMIN — SODIUM CHLORIDE, PRESERVATIVE FREE 40 MG: 5 INJECTION INTRAVENOUS at 22:02

## 2024-08-13 RX ADMIN — BUDESONIDE 500 MCG: 0.5 SUSPENSION RESPIRATORY (INHALATION) at 08:00

## 2024-08-13 RX ADMIN — MIDODRINE HYDROCHLORIDE 5 MG: 5 TABLET ORAL at 08:16

## 2024-08-13 RX ADMIN — RIFAXIMIN 550 MG: 550 TABLET ORAL at 08:17

## 2024-08-13 RX ADMIN — IPRATROPIUM BROMIDE AND ALBUTEROL SULFATE 1 DOSE: 2.5; .5 SOLUTION RESPIRATORY (INHALATION) at 08:00

## 2024-08-13 RX ADMIN — IPRATROPIUM BROMIDE AND ALBUTEROL SULFATE 1 DOSE: 2.5; .5 SOLUTION RESPIRATORY (INHALATION) at 14:53

## 2024-08-13 RX ADMIN — SODIUM CHLORIDE: 9 INJECTION, SOLUTION INTRAVENOUS at 02:24

## 2024-08-13 ASSESSMENT — PAIN SCALES - GENERAL
PAINLEVEL_OUTOF10: 0

## 2024-08-13 ASSESSMENT — PULMONARY FUNCTION TESTS
PIF_VALUE: 17
PIF_VALUE: 18
PIF_VALUE: 17
PIF_VALUE: 21
PIF_VALUE: 22
PIF_VALUE: 21

## 2024-08-13 NOTE — PROGRESS NOTES
Progress note  [facility]    Adult Hospitalist      Name: Aiden Black  MRN: [id number]     Acct: [id number]  Room: [address] [place] [address]    Admit Date: 8/9/2024  6:45 PM  PCP: Angelica Cruz MD    Primary Problem  Principal Problem:    COPD exacerbation (HCC)  Active Problems:    Hypoxia  Resolved Problems:    * No resolved hospital problems. *        Assesment/ plan:     Patient is admitted to ICU  Cardiac monitoring  Patient intubated  IV sedation   IV pressor      Acute COPD exacerbation  IV Solu-Medrol  Duo nebs   Respiratory pathogen panel negative  Sputum culture   Empiric IV ceftriaxone and Zithromax  Consult pulmonology - appreciate recs    Acute hypoxic and hypercarbic respiratory failure   Secondary to above   Mechanical ventilation sedation as per Critical Care   MRSA PCR negative  Critical care following    Shock, etiology unclear  Monitor blood pressure   Initially sammy and now levophed.   IV fluids  Lactate 1.7  Procalcitonin 0.08  Blood culture pending  Sputum culture    Hyperglycemia  - IV fluids switched to normal saline  - 400s-->122    Cirrhosis  Patient has had history of esophageal varices  Patient has OG tube.  Monitor for bleeding  PPI twice a day  Monitor LFTs  Currently on lactulose and Xifaxan   Liver ultrasound pending  GI consult  AFP 2.1  H/H - 11.9  PPI BID    Sinus Bradycardia  - cardiology following. No evidence of High grade AV block or conduction disease. Continue to monitor. Echo pending.     Goals of care  - palliative care consulted.         Continue to monitor/telemetry/CBC with differential daily/BMP daily  DVT and GI prophylaxis.  Continue medications as below      Scheduled Meds:   lactated ringers  500 mL IntraVENous Once    insulin lispro  0-4 Units SubCUTAneous TID WC    insulin lispro  0-4 Units SubCUTAneous Nightly    midodrine  5 mg Oral TID WC    lactulose  30 g Oral TID    arformoterol tartrate  15 mcg Nebulization BID RT    ipratropium 0.5 mg-albuterol 2.5 mg

## 2024-08-13 NOTE — PROGRESS NOTES
End Of Shift Note  [facility]  Summary of shift: Patient remains lethargic and minimally responsive. Sedation has been off since 08/12/24 @ 1030. Patient did c-pap throughout most of the shift. GI was contacted regarding tube feeds, ok to start from their standpoint. Dietician will have TF formula available 08/14/24 in the am. Patient has had minimal urine output this shift. 1 x  mL bolus was given with only 125mL's out after, an additional 500 mL bolus of LR was ordered and is currently running. Patient remains on 3mcg/min of Levo. No BM's this shift. Family has been at bedside throughout the entire shift.     Vitals:    Vitals:    08/13/24 1730 08/13/24 1745 08/13/24 1800 08/13/24 1815   BP:       Pulse: 67 66 68 66   Resp: 19 20 19 18   Temp:       TempSrc:       SpO2: 97% 97% 97% 97%   Weight:       Height:            I&O:   Intake/Output Summary (Last 24 hours) at 8/13/2024 1831  Last data filed at 8/13/2024 1708  Gross per 24 hour   Intake 1769.22 ml   Output 730 ml   Net 1039.22 ml       Resp Status: Ventilator    Ventilator Settings:  Vent Mode: CPAP/PS Resp Rate (Set): 16 bpm/Vt (Set, mL): 550 mL/ /FiO2 : 30 %    Critical Care IV infusions:   [Held by provider] sodium chloride Stopped (08/13/24 1145)    dextrose      sodium chloride 10 mL/hr at 08/11/24 0628    midazolam 4 mg/hr (08/12/24 0450)    phenylephrine (REMI-SYNEPHRINE) 50 mg in sodium chloride 0.9 % 250 mL infusion 70 mcg/min (08/12/24 0450)    norepinephrine 4 mcg/min (08/13/24 0537)        LDA:   PICC 08/10/24 Left Brachial (Active)   Number of days: 3       Peripheral IV 08/09/24 Right Forearm (Active)   Number of days: 3       Peripheral IV 08/10/24 Distal;Left;Anterior Forearm (Active)   Number of days: 3       NG/OG/NJ/NE Tube Orogastric Center mouth (Active)   Number of days: 3       Urinary Catheter 08/10/24 Roca (Active)   Number of days: 3       ETT  (Active)   Number of days: 3       Arterial Line 08/10/24 Right Radial (Active)

## 2024-08-13 NOTE — PROGRESS NOTES
Occupational Therapy  [facility]  Occupational Therapy Not Seen Note    Patient not available for Occupational Therapy due to:    [] Testing:    [] Hemodialysis    [] Cancelled by RN:    []Refusal by Patient:    [] Surgery:     [] Intubation:     [] Pain Medication:    [] Sedation:     [] Spine Precautions :    [] Medical Instability:    [x] Other:cx eval as pt intubated/not appropriate for skilled OT at this time

## 2024-08-13 NOTE — PROGRESS NOTES
Section of Cardiology  Progress Note      Date:  8/13/2024  Patient: Aiden Black  Admission:  8/9/2024  6:45 PM  Admit DX: Hypoxemia [R09.02]  COPD exacerbation (HCC) [J44.1]  Hypoxia [R09.02]  Age:  73 y.o., 1950     LOS: 3 days     Reason for evaluation:   Badycardia      SUBJECTIVE:      No acute events overnight.  No events on telemetry; predominantly normal sinus rhythm in the 50-70s beats per minute.      Patient remains intubated.  He remains poorly responsive off sedation.  Family at bedside.  On low-dose pressors      REVIEW OF SYMPTOMS:   Unable to obtain      OBJECTIVE:      Current Inpatient Medications:   insulin lispro  0-4 Units SubCUTAneous Q6H    midodrine  5 mg Oral TID WC    lactulose  30 g Oral TID    arformoterol tartrate  15 mcg Nebulization BID RT    ipratropium 0.5 mg-albuterol 2.5 mg  1 Dose Inhalation 4x Daily RT    sodium chloride flush  5-40 mL IntraVENous 2 times per day    methylPREDNISolone  60 mg IntraVENous Q8H    budesonide  0.5 mg Nebulization BID RT    cefTRIAXone (ROCEPHIN) IV  1,000 mg IntraVENous Q24H    mirtazapine  15 mg Oral Nightly    rifAXIMin  550 mg Oral BID    cetirizine  10 mg Oral Daily    pantoprazole (PROTONIX) 40 mg in sodium chloride (PF) 0.9 % 10 mL injection  40 mg IntraVENous Q12H       IV Infusions (if any):   [Held by provider] sodium chloride Stopped (08/13/24 1145)    dextrose      sodium chloride 10 mL/hr at 08/11/24 0628    midazolam 4 mg/hr (08/12/24 0450)    phenylephrine (REMI-SYNEPHRINE) 50 mg in sodium chloride 0.9 % 250 mL infusion 70 mcg/min (08/12/24 0450)    norepinephrine 4 mcg/min (08/13/24 0537)           EXAM:   Vitals:    VITALS:  BP (!) 114/52   Pulse 71   Temp 98.2 °F (36.8 °C) (Oral)   Resp 23   Ht 1.803 m (5' 11\")   Wt 90.7 kg (200 lb)   SpO2 96%   BMI 27.89 kg/m²   24HR INTAKE/OUTPUT:    Intake/Output Summary (Last 24 hours) at 8/13/2024 1550  Last data filed at 8/13/2024 1124  Gross per 24 hour   Intake 1769.22 ml  block.  He had what was likely reflex bradycardia early and hospital course which has resolved.    No indication for pacing.    He has low normal LVEF.    We will continue to hold outpatient CHF medications including carvedilol and spironolactone.  These can be resumed  once patient is off of pressors.      No additional cardiac workup at this time.  We will sign off.  Please call if any questions should arise.      Aiden Wang MD  [facility] Cardiology - Electrophysiology

## 2024-08-13 NOTE — PLAN OF CARE
Problem: Respiratory - Adult  Goal: Achieves optimal ventilation and oxygenation  8/13/2024 1923 by Toña Parrish RCP  Outcome: Progressing     Problem: Respiratory - Adult  Goal: Able to breathe comfortably  Outcome: Progressing     Problem: Respiratory - Adult  Goal: Patient's breath sounds will be clear and equal  Outcome: Progressing     Problem: Respiratory - Adult  Goal: Adequate oxygenation  Description: Adequate oxygenation  Outcome: Progressing     Problem: Respiratory - Adult  Goal: Will be able to breathe spontaneously, without ventilator support  Description: Will be able to breathe spontaneously, without ventilator support  Outcome: Progressing         PROVIDE ADEQUATE OXYGENATION WITH ACCEPTABLE SP02/ABG'S    [x]  IDENTIFY APPROPRIATE OXYGEN THERAPY  [x]   MONITOR SP02/ABG'S AS NEEDED   [x]   PATIENT EDUCATION AS NEEDED      MECHANICAL VENTILATION     [x]  PROVIDE OPTIMAL VENTILATION  [x]   ASSESS FOR EXTUBATION READINESS  [x]   ASSESS FOR WEANING READINESS  [x]  EXTUBATE AS TOLERATED  [x]  IMPLEMENT ADULT MECHANICAL VENTILATION PROTOCOL  [x]  MAINTAIN ADEQUATE OXYGENATION  [x]  PERFORM SPONTANEOUS WEANING TRIAL AS TOLERATED      BRONCHOSPASM/BRONCHOCONSTRICTION    IMPROVE  AERATION/BREATHSOUNDS  ADMINISTER BRONCHODILATOR THERAPY AS APPROPRIATE  ASSESS BREATH SOUNDS  INITIATE AEROSOL PROTOCOL IF ORDERED TO DO SO  PATIENT EDUCATION AS NEEDED

## 2024-08-13 NOTE — PLAN OF CARE
Problem: Respiratory - Adult  Goal: Achieves optimal ventilation and oxygenation  Outcome: Progressing  Flowsheets  Taken 8/13/2024 0416 by Mireya Sánchez RCP  Achieves optimal ventilation and oxygenation:   Assess for changes in respiratory status   Oxygen supplementation based on oxygen saturation or arterial blood gases   Assess the need for suctioning and aspirate as needed   Respiratory therapy support as indicated  Taken 8/12/2024 2356 by Mireya Sánchez RCP  Achieves optimal ventilation and oxygenation:   Assess for changes in respiratory status   Oxygen supplementation based on oxygen saturation or arterial blood gases   Assess the need for suctioning and aspirate as needed   Respiratory therapy support as indicated  Taken 8/12/2024 2000 by Claire Arriaga RN  Achieves optimal ventilation and oxygenation:   Assess for changes in respiratory status   Assess for changes in mentation and behavior   Position to facilitate oxygenation and minimize respiratory effort   Oxygen supplementation based on oxygen saturation or arterial blood gases  Taken 8/12/2024 1910 by Jared Hollins RCP  Achieves optimal ventilation and oxygenation:   Assess for changes in respiratory status   Assess for changes in mentation and behavior   Position to facilitate oxygenation and minimize respiratory effort   Oxygen supplementation based on oxygen saturation or arterial blood gases   Initiate smoking cessation protocol as indicated   Assess the need for suctioning and aspirate as needed   Encourage broncho-pulmonary hygiene including cough, deep breathe, incentive spirometry   Assess and instruct to report shortness of breath or any respiratory difficulty   Respiratory therapy support as indicated     Problem: Skin/Tissue Integrity  Goal: Absence of new skin breakdown  Description: 1.  Monitor for areas of redness and/or skin breakdown  2.  Assess vascular access sites hourly  3.  Every 4-6 hours minimum:  Change  oxygen saturation probe site  4.  Every 4-6 hours:  If on nasal continuous positive airway pressure, respiratory therapy assess nares and determine need for appliance change or resting period.  Outcome: Progressing     Problem: ABCDS Injury Assessment  Goal: Absence of physical injury  Outcome: Progressing  Flowsheets (Taken 8/13/2024 0510)  Absence of Physical Injury: Implement safety measures based on patient assessment     Problem: Pain  Goal: Verbalizes/displays adequate comfort level or baseline comfort level  Outcome: Progressing      Problem: Safety - Medical Restraint  Goal: Remains free of injury from restraints (Restraint for Interference with Medical Device)  Description: INTERVENTIONS:  1. Determine that other, less restrictive measures have been tried or would not be effective before applying the restraint  2. Evaluate the patient's condition at the time of restraint application  3. Inform patient/family regarding the reason for restraint  4. Q2H: Monitor safety, psychosocial status, comfort, nutrition and hydration  Outcome: Progressing  Flowsheets  Taken 8/13/2024 0400 by Claire Arriaga RN  Remains free of injury from restraints (restraint for interference with medical device):   Determine that other, less restrictive measures have been tried or would not be effective before applying the restraint   Evaluate the patient's condition at the time of restraint application   Inform patient/family regarding the reason for restraint   Every 2 hours: Monitor safety, psychosocial status, comfort, nutrition and hydration  Taken 8/13/2024 0200 by Claire Arriaga RN  Remains free of injury from restraints (restraint for interference with medical device):   Determine that other, less restrictive measures have been tried or would not be effective before applying the restraint   Evaluate the patient's condition at the time of restraint application   Inform patient/family regarding the reason for restraint    status, comfort, nutrition and hydration  Taken 8/12/2024 1600 by Maggy Polk RN  Remains free of injury from restraints (restraint for interference with medical device):   Determine that other, less restrictive measures have been tried or would not be effective before applying the restraint   Evaluate the patient's condition at the time of restraint application   Inform patient/family regarding the reason for restraint   Every 2 hours: Monitor safety, psychosocial status, comfort, nutrition and hydration     Problem: Safety - Adult  Goal: Free from fall injury  Outcome: Progressing  Flowsheets (Taken 8/13/2024 0510)  Free From Fall Injury:   Instruct family/caregiver on patient safety   Based on caregiver fall risk screen, instruct family/caregiver to ask for assistance with transferring infant if caregiver noted to have fall risk factors

## 2024-08-13 NOTE — PROGRESS NOTES
[facility] - GI Progress Note    Patient:   Aiden Black   :    1950   Med Rec#:                 [id number]   Date:     2024  Consultant:   EMILI CHAVEZ MD        SUBJECTIVE:     Intubated, but no longer sedated.  Patient not responding to verbal stimuli.  Minimal response to noxious stimuli    CURRENT MEDICATIONS:  .  Scheduled Meds:   insulin lispro  0-4 Units SubCUTAneous Q6H    midodrine  5 mg Oral TID WC    lactulose  30 g Oral TID    arformoterol tartrate  15 mcg Nebulization BID RT    ipratropium 0.5 mg-albuterol 2.5 mg  1 Dose Inhalation 4x Daily RT    sodium chloride flush  5-40 mL IntraVENous 2 times per day    methylPREDNISolone  60 mg IntraVENous Q8H    budesonide  0.5 mg Nebulization BID RT    cefTRIAXone (ROCEPHIN) IV  1,000 mg IntraVENous Q24H    mirtazapine  15 mg Oral Nightly    rifAXIMin  550 mg Oral BID    cetirizine  10 mg Oral Daily    pantoprazole (PROTONIX) 40 mg in sodium chloride (PF) 0.9 % 10 mL injection  40 mg IntraVENous Q12H     .  Continuous Infusions:   [Held by provider] sodium chloride Stopped (24 1145)    dextrose      sodium chloride 10 mL/hr at 24 0628    midazolam 4 mg/hr (24 0450)    phenylephrine (REMI-SYNEPHRINE) 50 mg in sodium chloride 0.9 % 250 mL infusion 70 mcg/min (24 0450)    norepinephrine 4 mcg/min (24 0537)     .  PRN Meds:glucose, dextrose bolus **OR** dextrose bolus, glucagon (rDNA), dextrose, potassium chloride **OR** potassium chloride, sodium chloride flush, sodium chloride, magnesium sulfate, ondansetron **OR** ondansetron, polyethylene glycol, acetaminophen **OR** acetaminophen, albuterol, fentanNYL, midazolam, sodium phosphate 15 mmol in sodium chloride 0.9 % 250 mL IVPB  .      PHYSICAL EXAM:   .    Temp (24hrs), Av.7 °F (36.5 °C), Min:96.7 °F (35.9 °C), Max:98.2 °F (36.8 °C)    BP (!) 114/52   Pulse 71   Temp 98.2 °F (36.8 °C) (Oral)   Resp 23   Ht 1.803 m (5' 11\")   Wt 90.7 kg (200 lb)    SpO2 96%   BMI 27.89 kg/m²    .  General:    Intubated with orogastric tube in place  Lungs:   Coarse bilaterally    Heart:   Regular  Abdomen:   Soft, nontender, ND, bowel sounds normal, no masses  Extremities:   No clubbing, No cyanosis, bilateral edema  Skin:    No jaundice      LABS and IMAGING:     CBC  Recent Labs     08/11/24 0421 08/12/24  0532 08/13/24  0343   WBC 13.7* 11.3 7.4   HGB 14.0 11.9* 13.3   .0 105.9* 102.9   RDW 13.9 14.5* 14.2   PLT 91* 70* 56*     BMP  Recent Labs     08/11/24  0421 08/11/24  1100 08/11/24  1659 08/12/24  0532 08/13/24  0343     --   --  138 143   K 3.5*   < > 4.4 3.9 4.6     --   --  109* 111*   CO2 22  --   --  20 20   BUN 27*  --   --  24* 35*   CREATININE 1.0  --   --  0.8 1.2   GLUCOSE 137*  --   --  448* 129*   CALCIUM 8.7  --   --  7.4* 8.7   MG 1.7  --   --   --   --     < > = values in this interval not displayed.     LFTS  Recent Labs     08/11/24 0421 08/12/24  0532 08/13/24  0343   ALKPHOS 62 51 59   ALT 17 16 55*   AST 19 18 54*   BILITOT 2.3* 1.5* 1.6*     AMYLASE/LIPASE/AMMONIA  Recent Labs     08/12/24  1255 08/13/24  0343   AMMONIA 62* 58     ASSESSMENT and PLAN:    .  Decompensated cirrhosis related.  History of gastric varices, and reportedly, esophageal varices.  Reference to his last EGD does not make mention of esophageal varices, however.  We can continue nasogastric tube for administration of lactulose and other medications.  Tube feeds are also acceptable at this time.  CODE STATUS noted.  We will take a less aggressive approach.  Discussed with wife and son extensively.  If hoa bleeding should occur, we may not necessarily perform EGD.  Continue pantoprazole.  Nonselective beta-blocker would be ideal, however, his pulmonary status will almost certainly preclude this type of medication.    Agree with palliative care.    Naresh Foley M.D.  3:45 PM  8/13/2024

## 2024-08-13 NOTE — PROGRESS NOTES
End Of Shift Note  [facility]  Summary of shift: Patient had uneventful night, patient is currently on Levophed 4mcg/min titrated per order. Versed gtt is still off, patient had intermittent response to command, able to wiggle and open eyes. Patient became hypothermic, increased room temp and placed a blanket and patient became normothermic. Jarvis Stoddard sat bedside throughout the night.     Vitals:    Vitals:    08/13/24 0650 08/13/24 0655 08/13/24 0700 08/13/24 0715   BP:       Pulse: 59 60 70 59   Resp: 16 16 16 16   Temp:       TempSrc:       SpO2: 98% 98% 98% 98%   Weight:       Height:            I&O:   Intake/Output Summary (Last 24 hours) at 8/13/2024 0725  Last data filed at 8/13/2024 0630  Gross per 24 hour   Intake 1781.02 ml   Output 1325 ml   Net 456.02 ml       Resp Status: NO changes to vent setting    Ventilator Settings:  Vent Mode: AC/PRVC Resp Rate (Set): 16 bpm/Vt (Set, mL): 550 mL/ /FiO2 : 30 %    Critical Care IV infusions:   sodium chloride 50 mL/hr at 08/13/24 0520    dextrose      sodium chloride 10 mL/hr at 08/11/24 0628    midazolam 4 mg/hr (08/12/24 0450)    phenylephrine (REMI-SYNEPHRINE) 50 mg in sodium chloride 0.9 % 250 mL infusion 70 mcg/min (08/12/24 0450)    norepinephrine 5 mcg/min (08/13/24 0448)        LDA:   PICC 08/10/24 Left Brachial (Active)   Number of days: 2       Peripheral IV 08/09/24 Right Forearm (Active)   Number of days: 3       Peripheral IV 08/10/24 Distal;Left;Anterior Forearm (Active)   Number of days: 3       NG/OG/NJ/NE Tube Orogastric Center mouth (Active)   Number of days: 2       Urinary Catheter 08/10/24 Roca (Active)   Number of days: 3       ETT  (Active)   Number of days: 3       Arterial Line 08/10/24 Right Radial (Active)   Number of days: 3

## 2024-08-13 NOTE — PLAN OF CARE
Problem: Discharge Planning  Goal: Discharge to home or other facility with appropriate resources  8/13/2024 0856 by Maggy Polk RN  Outcome: Progressing  Flowsheets (Taken 8/13/2024 0800)  Discharge to home or other facility with appropriate resources: Identify barriers to discharge with patient and caregiver  8/13/2024 0511 by Claire Arriaga RN  Outcome: Progressing  Flowsheets (Taken 8/12/2024 2000)  Discharge to home or other facility with appropriate resources:   Identify barriers to discharge with patient and caregiver   Arrange for needed discharge resources and transportation as appropriate   Identify discharge learning needs (meds, wound care, etc)   Refer to discharge planning if patient needs post-hospital services based on physician order or complex needs related to functional status, cognitive ability or social support system     Problem: Safety - Medical Restraint  Goal: Remains free of injury from restraints (Restraint for Interference with Medical Device)  Description: INTERVENTIONS:  1. Determine that other, less restrictive measures have been tried or would not be effective before applying the restraint  2. Evaluate the patient's condition at the time of restraint application  3. Inform patient/family regarding the reason for restraint  4. Q2H: Monitor safety, psychosocial status, comfort, nutrition and hydration  8/13/2024 0856 by Maggy Polk RN  Outcome: Progressing  Flowsheets  Taken 8/13/2024 0800 by Maggy Polk RN  Remains free of injury from restraints (restraint for interference with medical device):   Determine that other, less restrictive measures have been tried or would not be effective before applying the restraint   Evaluate the patient's condition at the time of restraint application   Inform patient/family regarding the reason for restraint   Every 2 hours: Monitor safety, psychosocial status, comfort, nutrition and hydration  Taken 8/13/2024 0600 by Bart

## 2024-08-13 NOTE — PROGRESS NOTES
Dr. Bañuelos rounded on the patient. Patient has only had 80 mL's out since 0630,  Notified orders - D/C NS and add 500 mL LR bolus.

## 2024-08-13 NOTE — PROGRESS NOTES
Pulmonary Critical Care Progress Note    Patient seen for the follow up of COPD exacerbation (HCC)     Subjective:    He has been on Levophed at 3 mcg/min.  Heart rate improved.  He is tolerating CPAP 30% FiO2 pressure support of 8.  He has not been following commands but opens eyes on tactile stimulation.  He is off Versed..  No bleeding per NG.  Lactulose restarted.  He is off Coreg home dose.  He had small bowel movement    Examination:    Vitals: BP (!) 114/52   Pulse 66   Temp 98.2 °F (36.8 °C) (Oral)   Resp 20   Ht 1.803 m (5' 11\")   Wt 90.7 kg (200 lb)   SpO2 97%   BMI 27.89 kg/m²   SpO2  Av.4 %  Min: 96 %  Max: 100 %  General appearance: Intubated lethargic not following commands  Neck: No JVD  Lungs: Decreased breath sound no crackles or wheeze  Heart: regular rate and rhythm, S1, S2 normal, no gallop  Abdomen: Soft, non tender, + BS  Extremities: no cyanosis or clubbing. No significant edema    LABs:    CBC:   Recent Labs     24  0532 24   WBC 11.3 7.4   HGB 11.9* 13.3   HCT 35.9* 39.1*   PLT 70* 56*     BMP:   Recent Labs     2432 24    143   K 3.9 4.6   CO2 20 20   BUN 24* 35*   CREATININE 0.8 1.2   LABGLOM >90 63   GLUCOSE 448* 129*     PT/INR:   No results for input(s): \"PROTIME\", \"INR\" in the last 72 hours.    APTT:  No results for input(s): \"APTT\" in the last 72 hours.    LIVER PROFILE:  Recent Labs     24  0532 24   AST 18 54*   ALT 16 55*      Latest Reference Range & Units 24 05:18   POC pH 7.350 - 7.450  7.405   POC pCO2 35.0 - 48.0 mm Hg 36.1   POC PO2 83.0 - 108.0 mm Hg 123.3 (H)   POC HCO3 21.0 - 28.0 mmol/L 22.6   POC O2 SAT 94.0 - 98.0 % 98.8 (H)   (H): Data is abnormally high  (L): Data is abnormally low  Radiology:    Chest x-ray    unchanged without obvious opacities reviewed    Echocardiogram    Left Ventricle: Normal left ventricular systolic function with a visually estimated EF of 50 - 55%. Left

## 2024-08-13 NOTE — PROGRESS NOTES
Patiens urine output after 500 mL bolus of LR was only 125 mL's  . Dr. Bañuelos notified and additional bolus of 500 mL's was ordered.

## 2024-08-13 NOTE — PROGRESS NOTES
Maurice, of [facility], called and is signing off at this time. Unless a significant event or severe neuro decline occurs, at which time they can be re-contacted.

## 2024-08-14 ENCOUNTER — APPOINTMENT (OUTPATIENT)
Dept: GENERAL RADIOLOGY | Age: 74
End: 2024-08-14
Payer: MEDICARE

## 2024-08-14 ENCOUNTER — APPOINTMENT (OUTPATIENT)
Dept: MRI IMAGING | Age: 74
End: 2024-08-14
Payer: MEDICARE

## 2024-08-14 PROBLEM — G93.40 ACUTE ENCEPHALOPATHY: Status: ACTIVE | Noted: 2024-08-14

## 2024-08-14 LAB
ALBUMIN SERPL-MCNC: 3 G/DL (ref 3.5–5.2)
ALP SERPL-CCNC: 55 U/L (ref 40–129)
ALT SERPL-CCNC: 53 U/L (ref 5–41)
ANION GAP SERPL CALCULATED.3IONS-SCNC: 10 MMOL/L (ref 9–17)
AST SERPL-CCNC: 40 U/L
BASOPHILS # BLD: 0 K/UL (ref 0–0.2)
BASOPHILS NFR BLD: 0 %
BILIRUB SERPL-MCNC: 1.5 MG/DL (ref 0.3–1.2)
BUN SERPL-MCNC: 45 MG/DL (ref 8–23)
BUN/CREAT SERPL: 32 (ref 9–20)
CALCIUM SERPL-MCNC: 8.7 MG/DL (ref 8.6–10.4)
CHLORIDE SERPL-SCNC: 112 MMOL/L (ref 98–107)
CO2 SERPL-SCNC: 21 MMOL/L (ref 20–31)
CREAT SERPL-MCNC: 1.4 MG/DL (ref 0.7–1.2)
EKG ATRIAL RATE: 60 BPM
EKG P-R INTERVAL: 190 MS
EKG Q-T INTERVAL: 450 MS
EKG QRS DURATION: 82 MS
EKG QTC CALCULATION (BAZETT): 450 MS
EKG R AXIS: 76 DEGREES
EKG T AXIS: 84 DEGREES
EKG VENTRICULAR RATE: 60 BPM
EOSINOPHIL # BLD: 0 K/UL (ref 0–0.4)
EOSINOPHILS RELATIVE PERCENT: 0 % (ref 1–4)
ERYTHROCYTE [DISTWIDTH] IN BLOOD BY AUTOMATED COUNT: 14.2 % (ref 11.8–14.4)
FIO2: 30
FOLATE SERPL-MCNC: 7.5 NG/ML (ref 4.8–24.2)
GFR, ESTIMATED: 52 ML/MIN/1.73M2
GLUCOSE BLD-MCNC: 116 MG/DL (ref 75–110)
GLUCOSE BLD-MCNC: 126 MG/DL (ref 75–110)
GLUCOSE BLD-MCNC: 130 MG/DL (ref 75–110)
GLUCOSE BLD-MCNC: 153 MG/DL (ref 75–110)
GLUCOSE SERPL-MCNC: 121 MG/DL (ref 70–99)
HCT VFR BLD AUTO: 36.8 % (ref 40.7–50.3)
HGB BLD-MCNC: 12.5 G/DL (ref 13–17)
IMM GRANULOCYTES # BLD AUTO: 0 K/UL (ref 0–0.3)
IMM GRANULOCYTES NFR BLD: 0 %
LYMPHOCYTES NFR BLD: 0.52 K/UL (ref 1–4.8)
LYMPHOCYTES RELATIVE PERCENT: 8 % (ref 24–44)
MCH RBC QN AUTO: 35.4 PG (ref 25.2–33.5)
MCHC RBC AUTO-ENTMCNC: 34 G/DL (ref 28.4–34.8)
MCV RBC AUTO: 104.2 FL (ref 82.6–102.9)
MODE: ABNORMAL
MONOCYTES NFR BLD: 0.07 K/UL (ref 0.2–0.8)
MONOCYTES NFR BLD: 1 % (ref 1–7)
NEGATIVE BASE EXCESS, ART: 3.1 MMOL/L (ref 0–2)
NEUTROPHILS NFR BLD: 91 % (ref 36–66)
NEUTS SEG NFR BLD: 5.91 K/UL (ref 1.8–7.7)
NRBC BLD-RTO: 0 PER 100 WBC
O2 DELIVERY DEVICE: ABNORMAL
PLATELET # BLD AUTO: 52 K/UL (ref 138–453)
PMV BLD AUTO: 10.4 FL (ref 8.1–13.5)
POC HCO3: 21.7 MMOL/L (ref 21–28)
POC O2 SATURATION: 98.2 % (ref 94–98)
POC PCO2: 36.7 MM HG (ref 35–48)
POC PH: 7.38 (ref 7.35–7.45)
POC PO2: 110 MM HG (ref 83–108)
POTASSIUM SERPL-SCNC: 5 MMOL/L (ref 3.7–5.3)
PROT SERPL-MCNC: 5.2 G/DL (ref 6.4–8.3)
RBC # BLD AUTO: 3.53 M/UL (ref 4.21–5.77)
SAMPLE SITE: ABNORMAL
SODIUM SERPL-SCNC: 143 MMOL/L (ref 135–144)
T PALLIDUM AB SER QL IA: NONREACTIVE
TSH SERPL DL<=0.05 MIU/L-ACNC: 0.33 UIU/ML (ref 0.3–5)
VIT B12 SERPL-MCNC: 788 PG/ML (ref 232–1245)
WBC OTHER # BLD: 6.5 K/UL (ref 3.5–11.3)

## 2024-08-14 PROCEDURE — 95816 EEG AWAKE AND DROWSY: CPT | Performed by: PSYCHIATRY & NEUROLOGY

## 2024-08-14 PROCEDURE — 82947 ASSAY GLUCOSE BLOOD QUANT: CPT

## 2024-08-14 PROCEDURE — 94640 AIRWAY INHALATION TREATMENT: CPT

## 2024-08-14 PROCEDURE — 99223 1ST HOSP IP/OBS HIGH 75: CPT | Performed by: PSYCHIATRY & NEUROLOGY

## 2024-08-14 PROCEDURE — 2580000003 HC RX 258: Performed by: NURSE PRACTITIONER

## 2024-08-14 PROCEDURE — 82746 ASSAY OF FOLIC ACID SERUM: CPT

## 2024-08-14 PROCEDURE — 6360000002 HC RX W HCPCS: Performed by: INTERNAL MEDICINE

## 2024-08-14 PROCEDURE — 36415 COLL VENOUS BLD VENIPUNCTURE: CPT

## 2024-08-14 PROCEDURE — 85025 COMPLETE CBC W/AUTO DIFF WBC: CPT

## 2024-08-14 PROCEDURE — 80053 COMPREHEN METABOLIC PANEL: CPT

## 2024-08-14 PROCEDURE — 37799 UNLISTED PX VASCULAR SURGERY: CPT

## 2024-08-14 PROCEDURE — 70551 MRI BRAIN STEM W/O DYE: CPT

## 2024-08-14 PROCEDURE — 82607 VITAMIN B-12: CPT

## 2024-08-14 PROCEDURE — 2700000000 HC OXYGEN THERAPY PER DAY

## 2024-08-14 PROCEDURE — 94003 VENT MGMT INPAT SUBQ DAY: CPT

## 2024-08-14 PROCEDURE — 71045 X-RAY EXAM CHEST 1 VIEW: CPT

## 2024-08-14 PROCEDURE — 82803 BLOOD GASES ANY COMBINATION: CPT

## 2024-08-14 PROCEDURE — 95819 EEG AWAKE AND ASLEEP: CPT

## 2024-08-14 PROCEDURE — 6370000000 HC RX 637 (ALT 250 FOR IP): Performed by: INTERNAL MEDICINE

## 2024-08-14 PROCEDURE — 2580000003 HC RX 258: Performed by: INTERNAL MEDICINE

## 2024-08-14 PROCEDURE — 94761 N-INVAS EAR/PLS OXIMETRY MLT: CPT

## 2024-08-14 PROCEDURE — 86780 TREPONEMA PALLIDUM: CPT

## 2024-08-14 PROCEDURE — 6370000000 HC RX 637 (ALT 250 FOR IP): Performed by: FAMILY MEDICINE

## 2024-08-14 PROCEDURE — 84443 ASSAY THYROID STIM HORMONE: CPT

## 2024-08-14 PROCEDURE — 2000000000 HC ICU R&B

## 2024-08-14 RX ADMIN — WATER 60 MG: 1 INJECTION INTRAMUSCULAR; INTRAVENOUS; SUBCUTANEOUS at 02:25

## 2024-08-14 RX ADMIN — LACTULOSE 30 G: 20 SOLUTION ORAL at 08:27

## 2024-08-14 RX ADMIN — WATER 60 MG: 1 INJECTION INTRAMUSCULAR; INTRAVENOUS; SUBCUTANEOUS at 17:59

## 2024-08-14 RX ADMIN — SODIUM CHLORIDE, PRESERVATIVE FREE 10 ML: 5 INJECTION INTRAVENOUS at 08:29

## 2024-08-14 RX ADMIN — SODIUM CHLORIDE, PRESERVATIVE FREE 40 MG: 5 INJECTION INTRAVENOUS at 21:01

## 2024-08-14 RX ADMIN — WATER 1000 MG: 1 INJECTION INTRAMUSCULAR; INTRAVENOUS; SUBCUTANEOUS at 02:27

## 2024-08-14 RX ADMIN — IPRATROPIUM BROMIDE AND ALBUTEROL SULFATE 1 DOSE: 2.5; .5 SOLUTION RESPIRATORY (INHALATION) at 16:01

## 2024-08-14 RX ADMIN — IPRATROPIUM BROMIDE AND ALBUTEROL SULFATE 1 DOSE: 2.5; .5 SOLUTION RESPIRATORY (INHALATION) at 11:12

## 2024-08-14 RX ADMIN — ARFORMOTEROL TARTRATE 15 MCG: 15 SOLUTION RESPIRATORY (INHALATION) at 19:32

## 2024-08-14 RX ADMIN — CETIRIZINE HYDROCHLORIDE 10 MG: 10 TABLET, FILM COATED ORAL at 08:28

## 2024-08-14 RX ADMIN — ARFORMOTEROL TARTRATE 15 MCG: 15 SOLUTION RESPIRATORY (INHALATION) at 07:38

## 2024-08-14 RX ADMIN — SODIUM CHLORIDE, PRESERVATIVE FREE 40 MG: 5 INJECTION INTRAVENOUS at 08:27

## 2024-08-14 RX ADMIN — IPRATROPIUM BROMIDE AND ALBUTEROL SULFATE 1 DOSE: 2.5; .5 SOLUTION RESPIRATORY (INHALATION) at 07:38

## 2024-08-14 RX ADMIN — RIFAXIMIN 550 MG: 550 TABLET ORAL at 08:28

## 2024-08-14 RX ADMIN — MIDODRINE HYDROCHLORIDE 5 MG: 5 TABLET ORAL at 08:28

## 2024-08-14 RX ADMIN — SODIUM CHLORIDE, PRESERVATIVE FREE 10 ML: 5 INJECTION INTRAVENOUS at 21:01

## 2024-08-14 RX ADMIN — LACTULOSE 30 G: 20 SOLUTION ORAL at 21:00

## 2024-08-14 RX ADMIN — RIFAXIMIN 550 MG: 550 TABLET ORAL at 21:00

## 2024-08-14 RX ADMIN — LACTULOSE 30 G: 20 SOLUTION ORAL at 15:52

## 2024-08-14 RX ADMIN — IPRATROPIUM BROMIDE AND ALBUTEROL SULFATE 1 DOSE: 2.5; .5 SOLUTION RESPIRATORY (INHALATION) at 19:32

## 2024-08-14 RX ADMIN — BUDESONIDE 500 MCG: 0.5 SUSPENSION RESPIRATORY (INHALATION) at 07:38

## 2024-08-14 RX ADMIN — MIDODRINE HYDROCHLORIDE 5 MG: 5 TABLET ORAL at 13:21

## 2024-08-14 RX ADMIN — WATER 60 MG: 1 INJECTION INTRAMUSCULAR; INTRAVENOUS; SUBCUTANEOUS at 13:02

## 2024-08-14 RX ADMIN — MIDODRINE HYDROCHLORIDE 5 MG: 5 TABLET ORAL at 15:52

## 2024-08-14 RX ADMIN — BUDESONIDE 500 MCG: 0.5 SUSPENSION RESPIRATORY (INHALATION) at 19:32

## 2024-08-14 ASSESSMENT — PULMONARY FUNCTION TESTS
PIF_VALUE: 23
PIF_VALUE: 22
PIF_VALUE: 22
PIF_VALUE: 20
PIF_VALUE: 17
PIF_VALUE: 21
PIF_VALUE: 22
PIF_VALUE: 29
PIF_VALUE: 22
PIF_VALUE: 20
PIF_VALUE: 21
PIF_VALUE: 21
PIF_VALUE: 23
PIF_VALUE: 26
PIF_VALUE: 18
PIF_VALUE: 26
PIF_VALUE: 22
PIF_VALUE: 20
PIF_VALUE: 22
PIF_VALUE: 20
PIF_VALUE: 25
PIF_VALUE: 23
PIF_VALUE: 22
PIF_VALUE: 24
PIF_VALUE: 20

## 2024-08-14 ASSESSMENT — PAIN SCALES - GENERAL
PAINLEVEL_OUTOF10: 0

## 2024-08-14 NOTE — PROGRESS NOTES
Radiology called to alert that preliminary report from MRI ready. Message sent to Dr. Castaneda to read results. Waiting on further orders

## 2024-08-14 NOTE — PROGRESS NOTES
Spiritual Health Assessment/Progress Note  [facility]    (P) Palliative Care, Spiritual/Emotional Needs,  ,  ,      Name: Aiden Black MRN: [id number]    Age: 73 y.o.     Sex: male   Language: English   Voodoo: Yazidism   COPD exacerbation (HCC)     Date: 8/14/2024            Total Time Calculated: (P) 18 min              Spiritual Assessment began in [facility]        Referral/Consult From: (P) Palliative Care   Encounter Overview/Reason: (P) Palliative Care, Spiritual/Emotional Needs  Service Provided For: (P) Family  Spouse expressed her [employer] family \"are all praying\" and values  support and prayer.  Ana, Belief, Meaning:   Patient unable to communicate at this time  Family/Friends identify as spiritual, are connected with a ana tradition or spiritual practice, and have beliefs or practices that help with coping during difficult times      Importance and Influence:  Patient unable to communicate at this time  Family/Friends have spiritual/personal beliefs that influence decisions regarding the patient's health    Community:  Patient is connected with a spiritual community  Family/Friends are connected with a spiritual community: and feel well-supported. Support system includes: Children and Ana Community    Assessment and Plan of Care:     Patient Interventions include: Other: Patient unable to communicate but  provided supportive presence and spiritual care for spouse and other family.  Family/Friends Interventions include: Affirmed coping skills/support systems    Patient Plan of Care: Spiritual Care available upon further referral  Family/Friends Plan of Care: Spiritual Care available upon further referral    Electronically signed by Chaplain Frank on 8/14/2024 at 11:17 AM

## 2024-08-14 NOTE — PROGRESS NOTES
Dr. Castaneda notified via perfect serve that EEG and MRI of brain are complete and ready to review

## 2024-08-14 NOTE — PROGRESS NOTES
Physician Progress Note      PATIENT:               CHRIS BROWNE  CSN #:                  [id number]  :                       1950  ADMIT DATE:       2024 6:45 PM  DISCH DATE:  RESPONDING  PROVIDER #:        Georgia Webster MD        QUERY TEXT:    Type of Shock: Please provide further specificity, if known.    Clinical indicators include: shock, blood, blood culture, bleeding, h/h, 4   units, bolus, gi bleed, mi, myocardial infarction, rbc, hgb, hct  Options provided:  -- Cardiogenic shock  -- Septic shock  -- Hypovolemic shock  -- Hemorrhagic shock due to trauma  -- Hemorrhagic shock related to surgery  -- Anaphylactic shock  -- Other - I will add my own diagnosis  -- Disagree - Not applicable / Not valid  -- Disagree - Clinically Unable to determine / Unknown        PROVIDER RESPONSE TEXT:    Shock, unspecified      Electronically signed by:  Georgia Webster MD 2024 2:25 PM

## 2024-08-14 NOTE — CONSULTS
Allergies:     Patient has no known allergies.    Social History:     Tobacco:    reports that he has quit smoking. His smoking use included cigarettes. He has never used smokeless tobacco.  Alcohol:      reports that he does not currently use alcohol.  Drug Use:  reports no history of drug use.    Family History:     History reviewed. No pertinent family history.    Review of Systems:       Unable to obtain 2/2 to mentation      Physical Exam:   BP (!) 112/54   Pulse 54   Temp 96.9 °F (36.1 °C) (Temporal)   Resp 16   Ht 1.803 m (5' 11\")   Wt 92 kg (202 lb 13.2 oz)   SpO2 99%   BMI 28.29 kg/m²   Temp (24hrs), Av.9 °F (36.6 °C), Min:96.9 °F (36.1 °C), Max:98.2 °F (36.8 °C)        General examination:      General Appearance:  Obtunded, intubated on MV  HEENT: Normocephalic, atraumatic  Neck: supple, (-) nuchal rigidity  Lungs:  Mechanical BS  Cardiovascular: normal rate, regular rhythm  Abdomen: Soft, mildly distended  Skin: No gross lesions, rashes  Extremities:  peripheral pulses palpable, mild diffuse edema      Neurological examination:    Mental status   Obtunded, opens eyes minimally to noxious stimuli.  Intermittently tracks but does not follow commands.     Cranial nerves   PERRL (4 mm OU), EOM's grossly intact, intermittently blinks to threat bilaterally, +weak gag and cough reflex, no clear facial droop     Motor function  Intermittent spontaneous movement of all 4 extremities                    Sensory function Withdraws to pain equally x 4 extremities     Cerebellar Unable to assess     Reflex function 2/4 symmetric throughout . Downgoing plantar response bilaterally. (-)Lema's sign bilaterally    Gait                  Unable to assess             Diagnostics:      Laboratory Testing:  CBC:   Recent Labs     24  0532 24  0343 24  0425   WBC 11.3 7.4 6.5   HGB 11.9* 13.3 12.5*   PLT 70* 56* 52*     BMP:    Recent Labs     24  0532 24  0343 24  0425   NA  patient remains minimally responsive despite being off sedation.    Clinical presentation warrants further evaluation.  Possible prolonged anesthetic effects given his baseline cirrhosis limiting clearance of medications.  He also has SHIVANI currently.  Nonetheless, will obtain further workup with MRI brain and EEG.    Acute encephalopathy, possible component of toxic-metabolic confounders as above in a patient with history of cirrhosis  COPD exacerbation    Plan:     Obtain MRI brain and EEG.  Maintain off sedation.  Limit any sedating medications.  Ammonia initially elevated but now wnl. Obtain TSH, vitamin B12, folate, T.pallidum AB.  Rest of care per primary team.  Family updated at bedside.       We will continue to follow.  Above discussed with primary attending, Dr. Webster.      Electronically signed by Gabriella Castaneda DO on 8/14/2024 at 8:50 AM      Gabriella Castaneda DO  [facility]  Neurology

## 2024-08-14 NOTE — PLAN OF CARE
Problem: Respiratory - Adult  Goal: Achieves optimal ventilation and oxygenation  8/14/2024 1925 by Toña Parrish RCP  Outcome: Progressing     Problem: Respiratory - Adult  Goal: Able to breathe comfortably  Outcome: Progressing     Problem: Respiratory - Adult  Goal: Patient's breath sounds will be clear and equal  Outcome: Progressing     Problem: Respiratory - Adult  Goal: Adequate oxygenation  Description: Adequate oxygenation  Outcome: Progressing     Problem: Respiratory - Adult  Goal: Will be able to breathe spontaneously, without ventilator support  Description: Will be able to breathe spontaneously, without ventilator support  Outcome: Progressing        BRONCHOSPASM/BRONCHOCONSTRICTION    IMPROVE  AERATION/BREATHSOUNDS  ADMINISTER BRONCHODILATOR THERAPY AS APPROPRIATE  ASSESS BREATH SOUNDS  INITIATE AEROSOL PROTOCOL IF ORDERED TO DO SO  PATIENT EDUCATION AS NEEDED      PROVIDE ADEQUATE OXYGENATION WITH ACCEPTABLE SP02/ABG'S    [x]  IDENTIFY APPROPRIATE OXYGEN THERAPY  [x]   MONITOR SP02/ABG'S AS NEEDED   [x]   PATIENT EDUCATION AS NEEDED      MECHANICAL VENTILATION     [x]  PROVIDE OPTIMAL VENTILATION  [x]   ASSESS FOR EXTUBATION READINESS  [x]   ASSESS FOR WEANING READINESS  [x]  EXTUBATE AS TOLERATED  [x]  IMPLEMENT ADULT MECHANICAL VENTILATION PROTOCOL  [x]  MAINTAIN ADEQUATE OXYGENATION  [x]  PERFORM SPONTANEOUS WEANING TRIAL AS TOLERATED

## 2024-08-14 NOTE — PROGRESS NOTES
Progress note  [facility]    Adult Hospitalist      Name: Aiden Black  MRN: [id number]     Acct: [id number]  Room: [address] [place] [address]    Admit Date: 8/9/2024  6:45 PM  PCP: Angelica Cruz MD    Primary Problem  Principal Problem:    COPD exacerbation (HCC)  Active Problems:    Hypoxia    Acute encephalopathy  Resolved Problems:    * No resolved hospital problems. *        Assesment/ plan:       Acute COPD exacerbation  IV Solu-Medrol  Duo nebs   Respiratory pathogen panel negative  Sputum culture   Empiric IV ceftriaxone and Zithromax  Consult pulmonology - appreciate recs    Acute hypoxic and hypercarbic respiratory failure   Secondary to above   Mechanical ventilation sedation as per Critical Care   MRSA PCR negative  Neurology consulted for acute encephalopathy  Tube feeds started     Encephalopathy:  -MRI EEG ordered and pending.   -Neurology following.    Shock, etiology unclear  Monitor blood pressure   Initially sammy and now levophed.   IV fluids  Lactate 1.7  Procalcitonin 0.08  Blood culture NTD  Sputum culture    Hyperglycemia  - IV fluids switched to normal saline  - 400s-->122    Cirrhosis  Patient has had history of esophageal varices  Patient has OG tube.  Monitor for bleeding  PPI twice a day  Monitor LFTs  Currently on lactulose and Xifaxan   Liver ultrasound   GI consult - no egd planned.   AFP 2.1  H/H - 11.9  PPI BID    Sinus Bradycardia  - cardiology following. No evidence of High grade AV block or conduction disease. Continue to monitor. Echo .     Goals of care  - palliative care consulted.       Continue to monitor/telemetry/CBC with differential daily/BMP daily  DVT and GI prophylaxis.  Continue medications as below      Scheduled Meds:   insulin lispro  0-4 Units SubCUTAneous Q6H    midodrine  5 mg Oral TID WC    lactulose  30 g Oral TID    arformoterol tartrate  15 mcg Nebulization BID RT    ipratropium 0.5 mg-albuterol 2.5 mg  1 Dose Inhalation 4x Daily RT    sodium chloride flush  MD Nicholas        Allergies:       Patient has no known allergies.    Social History:     Tobacco:    reports that he has quit smoking. His smoking use included cigarettes. He has never used smokeless tobacco.  Alcohol:      reports that he does not currently use alcohol.  Drug Use:  reports no history of drug use.    Family History:     History reviewed. No pertinent family history.      Physical Exam:     Vitals:  BP (!) 112/54   Pulse 72   Temp 96.8 °F (36 °C) (Temporal)   Resp 13   Ht 1.803 m (5' 11\")   Wt 92 kg (202 lb 13.2 oz)   SpO2 99%   BMI 28.29 kg/m²   Temp (24hrs), Av.6 °F (36.4 °C), Min:96.8 °F (36 °C), Max:98.2 °F (36.8 °C)          General appearance -  intubated  Mental status - unable to assess  Head - normocephalic and atraumatic  Eyes - pupils equal and reactive, extraocular eye movements intact, conjunctiva clear  Ears - hearing appears to be intact  Nose - no drainage noted  Mouth - mucous membranes moist  Neck - supple, no carotid bruits, thyroid not palpable  Chest -  bilateral air  entry  Heart - normal rate, regular rhythm, no murmur  Abdomen - soft, nontender, nondistended, bowel sounds present all four quadrants, no masses, hepatomegaly or splenomegaly  Neurological -  unable to assess  Extremities - peripheral pulses palpable, no pedal edema or calf pain with palpation  Skin - no gross lesions, rashes, or induration noted        Data:     Labs:    Hematology:  Recent Labs     24  0532 24  0343 24  0425   WBC 11.3 7.4 6.5   RBC 3.39* 3.80* 3.53*   HGB 11.9* 13.3 12.5*   HCT 35.9* 39.1* 36.8*   .9* 102.9 104.2*   MCH 35.1* 35.0* 35.4*   MCHC 33.1 34.0 34.0   RDW 14.5* 14.2 14.2   PLT 70* 56* 52*   MPV 10.5 10.3 10.4     Chemistry:  Recent Labs     24  0532 24  1255 24  2315 24  0343 24  1218 24  0425     --   --  143  --  143   K 3.9  --   --  4.6  --  5.0   *  --   --  111*  --  112*   CO2 20  --   --

## 2024-08-14 NOTE — PROGRESS NOTES
End Of Shift Note  [facility]  Summary of shift: Patient has not been able to follow commands; has been able to intermittently able to track RN with eyes. Patient has not received any sedation. Patient had multiple liquid BM over night. Gtt: Levophed 2mcg/min.   Vitals:    Vitals:    08/14/24 0550 08/14/24 0555 08/14/24 0600 08/14/24 0615   BP:       Pulse: 63 56 61 53   Resp: 16 16 16 16   Temp:       TempSrc:       SpO2: 98% 98% 98% 98%   Weight:       Height:            I&O:   Intake/Output Summary (Last 24 hours) at 8/14/2024 0632  Last data filed at 8/14/2024 0620  Gross per 24 hour   Intake 468.96 ml   Output 1755 ml   Net -1286.04 ml       Resp Status: No changes to vent setting     Ventilator Settings:  Vent Mode: AC/PRVC Resp Rate (Set): 16 bpm/Vt (Set, mL): 550 mL/ /FiO2 : 30 %    Critical Care IV infusions:   [Held by provider] sodium chloride Stopped (08/13/24 1139)    dextrose      sodium chloride 10 mL/hr at 08/11/24 0628    midazolam 4 mg/hr (08/12/24 0450)    phenylephrine (REMI-SYNEPHRINE) 50 mg in sodium chloride 0.9 % 250 mL infusion 70 mcg/min (08/12/24 0450)    norepinephrine 2 mcg/min (08/13/24 1914)        LDA:   PICC 08/10/24 Left Brachial (Active)   Number of days: 3       Peripheral IV 08/09/24 Right Forearm (Active)   Number of days: 4       Peripheral IV 08/10/24 Distal;Left;Anterior Forearm (Active)   Number of days: 4       NG/OG/NJ/NE Tube Orogastric Center mouth (Active)   Number of days: 3       Urinary Catheter 08/10/24 Roca (Active)   Number of days: 4       ETT  (Active)   Number of days: 4       Arterial Line 08/10/24 Right Radial (Active)   Number of days: 4

## 2024-08-14 NOTE — PROGRESS NOTES
Pulmonary Critical Care Progress Note    Patient seen for the follow up of COPD exacerbation (HCC)     Subjective:    He  had decreased urine output yesterday.  I ordered 2 boluses of 500 LR.  Urine output improved but now decreased borderline.  He as been on Levophed at 1.5 mcg/min.  Heart rate improved.  He is is unresponsive off sedation for over 24 hours.  He is on full vent support he has not been following commands but opens eyes on tactile stimulation.  He is off Coreg home dose.  He had small bowel movement    Examination:    Vitals: BP (!) 112/54   Pulse 67   Temp 96.8 °F (36 °C) (Temporal)   Resp 29   Ht 1.803 m (5' 11\")   Wt 92 kg (202 lb 13.2 oz)   SpO2 98%   BMI 28.29 kg/m²   SpO2  Av.7 %  Min: 94 %  Max: 100 %  General appearance: Intubated lethargic not following commands  Neck: No JVD  Lungs: Decreased breath sound no crackles or wheeze  Heart: regular rate and rhythm, S1, S2 normal, no gallop  Abdomen: Soft, non tender, + BS  Extremities: no cyanosis or clubbing. No significant edema    LABs:    CBC:   Recent Labs     243 24   WBC 7.4 6.5   HGB 13.3 12.5*   HCT 39.1* 36.8*   PLT 56* 52*     BMP:   Recent Labs     243 24  042    143   K 4.6 5.0   CO2 20 21   BUN 35* 45*   CREATININE 1.2 1.4*   LABGLOM 63 52*   GLUCOSE 129* 121*         LIVER PROFILE:  Recent Labs     243 24  0425   AST 54* 40*   ALT 55* 53*      Latest Reference Range & Units 24 04:29   POC HCO3 21.0 - 28.0 mmol/L 21.7   POC O2 SAT 94.0 - 98.0 % 98.2 (H)   POC pCO2 35.0 - 48.0 mm Hg 36.7   POC pH 7.350 - 7.450  7.379   POC PO2 83.0 - 108.0 mm Hg 110.0 (H)   (H): Data is abnormally high  Radiology:    Chest x-ray   Reviewed  Bibasilar atelectasis, mildly increased from previous exam. Cannot exclude trace bilateral pleural effusions.     Echocardiogram    Left Ventricle: Normal left ventricular systolic function with a visually estimated EF of 50 -  55%. Left ventricle size is normal. Normal wall thickness. Mild hypokinesis of the following segments: mid inferoseptal. Abnormal diastolic function.    Aortic Valve: Trileaflet valve.    Mitral Valve: Mildly thickened leaflets. Mild regurgitation.    Image quality is fair.       Impression/recommendations;    Acute hypoxic and hypercarbic respiratory failure requiring intubation  Continue assist-control ventilation FiO2 30% PEEP of 8 respiratory 16 tidal volume 550     Sedation with Versed RASS -2  Versed 2 mg IV as needed every hour  Fentanyl 25 every hour as needed for pain  Sedation vacation  Start tube feeding/cleared by GI for tube feeds     COPD exacerbation  DuoNeb by nebulizer every 4 hours  Pulmicort 0.5 twice daily by nebulizer  Brovana by nebulizer twice daily  Rocephin/off Zithromax  Solu-Medrol 60 IV every 8 hours       Hypotension requiring pressors/bradycardia  Continue Levophed to MAP above 65  Cardiology consult     liver cirrhosis history esophageal varices/thrombocytopenia  GI on consult no EGD planned for now  Monitor OG output/monitor hemoglobin hematocrit  Increase lactulose to 30 g 3 times daily/continue Xifaxan/monitor ammonia  Monitor platelet count       CO2 narcosis/possible encephalopathy with normalized ammonia  MRI brain per neurology ; if negative will consider Romazicon x 1  Monitor off sedation       Hyperglycemia on steroids  Monitor blood sugars  Insulin scale    suspected sleep apnea   Outpatient sleep evaluation     discussed with wife and sons at bedside again today updated on above  I previously advised to address CODE STATUS patient did not want prolonged ventilation  Discussed with RN   Discussed with clinical pharmacist  Peptic ulcer disease prophylaxis with Protonix 40 IV twice daily  DVT prophylaxis EPC    Sathish Bañuelos MD, MD, [facility]  Pulmonary Critical Care and Sleep Medicine,  [facility]  Cell: [phone]  Office: [phone]

## 2024-08-14 NOTE — PROGRESS NOTES
Comprehensive Nutrition Assessment    Type and Reason for Visit:  Consult (Tube feedings order and management)    Nutrition Recommendations/Plan:   NPO diet  Start Vital AF 1.2 Itz goal rate 65 mL/hr (1560 mL total volume). Water flush 30 mL every 4 hours  Monitor tube feeding rate, tolerance, vent status and labs     Malnutrition Assessment:  Malnutrition Status:  At risk for malnutrition (Comment) (08/14/24 2166)        Nutrition Assessment:    Patient admission is related to hypoxemia and COPD. Patient fell at home from weakness, shortness of breath and cough. Patient's family states he has been declining at home for a while and has been sleeping for 20 hours per day. Patient was intubated in ED upon arrival. Patient remains intubated but has not received sedation for over 24 hours and has not became responsive. Patient was recently intubated at [facility] and it was a rough extubation. Palliative is on the case. Nutrition consult received for tube feeding order and management. Recommend Vital AF 1.2 Itz at goal rate 65 mL/hr (1560 mL total volume). Will monitor tube feeding rate, tolerance, vent status and labs.    Nutrition Related Findings:    Edema: +2 pitting generalized edema. Active bowel sounds. Labs: BUN/Cr: 45/1.4 (H) Wound Type: None       Current Nutrition Intake & Therapies:    Average Meal Intake: NPO  Average Supplements Intake: NPO  Diet NPO  ADULT TUBE FEEDING; Orogastric; Peptide Based; Continuous; 15; Yes; 15; Q 4 hours; 65; 30; Q 4 hours  Current Tube Feeding (TF) Orders:  Feeding Route: Orogastric  Formula: Peptide Based  Schedule: Continuous  Feeding Regimen: Vital AF 1.2 Itz goal rate 65 mL/hr (1560 mL total volume)  Additives/Modulars: None  Water Flushes: 30 mL every 4 hours (180 mL total volume)  Current TF & Flush Orders Provides: 432 kcal, 27 gm of protein and 472 mL of free water  Goal TF & Flush Orders Provides: 1872 kcal, 117 gm of protein and 1445 mL of free

## 2024-08-14 NOTE — PROCEDURES
PROCEDURE NOTE  Date: 8/14/2024   Name: Aiden Black  YOB: 1950    Procedures            Date: 8/14/2024  Referring physician: Dr. Castaneda    Indication  Patient aged 73 y with encephalopathy. EEG done to assess for epileptiform activity.    Introduction  This routine 30-minute EEG was recorded using the [employer] 10-20 System on a [employer] workstation at 256 samples/s. Automated spike and seizure detection algorithms were applied.    Description  The background consistent of diffuse none reactive polymorphic delta and theta slowing with occasional triphasic eave discharges. No consistent focal slowing or interhemispheric asymmetry was noted. Stage I and stage II sleep were not observed. There were no interictal epileptiform discharges or electrographic seizures.    Activations  Hyperventilation was not performed. Intermittent photic stimulation was performed and demonstrated no posterior driving response.    Impression  Abnormal awake EEG. The slowing mentioned above suggests moderate non specific encephalopathy.     No epileptiform discharges were identified. Please note the absence of such activity on this record cannot conclusively rule out an epileptic disorder. If such is still clinically suspected, a repeat study with sleep deprivation and/or prolonged sampling may be helpful.    Georgia Medeiros MD  Epilepsy Board Certified.  Neurology Board Certified.    Electronically Signed

## 2024-08-14 NOTE — PLAN OF CARE
Problem: Discharge Planning  Goal: Discharge to home or other facility with appropriate resources  Outcome: Progressing     Problem: Safety - Medical Restraint  Goal: Remains free of injury from restraints (Restraint for Interference with Medical Device)  Description: INTERVENTIONS:  1. Determine that other, less restrictive measures have been tried or would not be effective before applying the restraint  2. Evaluate the patient's condition at the time of restraint application  3. Inform patient/family regarding the reason for restraint  4. Q2H: Monitor safety, psychosocial status, comfort, nutrition and hydration  Outcome: Progressing  Flowsheets  Taken 8/14/2024 0000 by Claire Arriaga RN  Remains free of injury from restraints (restraint for interference with medical device):   Determine that other, less restrictive measures have been tried or would not be effective before applying the restraint   Evaluate the patient's condition at the time of restraint application   Inform patient/family regarding the reason for restraint   Every 2 hours: Monitor safety, psychosocial status, comfort, nutrition and hydration  Taken 8/13/2024 2200 by Claire Arriaga RN  Remains free of injury from restraints (restraint for interference with medical device):   Determine that other, less restrictive measures have been tried or would not be effective before applying the restraint   Evaluate the patient's condition at the time of restraint application   Inform patient/family regarding the reason for restraint   Every 2 hours: Monitor safety, psychosocial status, comfort, nutrition and hydration  Taken 8/13/2024 2000 by Claire Arriaga RN  Remains free of injury from restraints (restraint for interference with medical device):   Determine that other, less restrictive measures have been tried or would not be effective before applying the restraint   Evaluate the patient's condition at the time of restraint application    restraint   Every 2 hours: Monitor safety, psychosocial status, comfort, nutrition and hydration  Taken 8/13/2024 1200 by Maggy Polk RN  Remains free of injury from restraints (restraint for interference with medical device):   Determine that other, less restrictive measures have been tried or would not be effective before applying the restraint   Evaluate the patient's condition at the time of restraint application   Inform patient/family regarding the reason for restraint   Every 2 hours: Monitor safety, psychosocial status, comfort, nutrition and hydration     Problem: Safety - Adult  Goal: Free from fall injury  Recent Flowsheet Documentation  Taken 8/14/2024 0136 by Claire Arriaga RN  Free From Fall Injury:   Instruct family/caregiver on patient safety   Based on caregiver fall risk screen, instruct family/caregiver to ask for assistance with transferring infant if caregiver noted to have fall risk factors     Problem: Respiratory - Adult  Goal: Achieves optimal ventilation and oxygenation  8/14/2024 0140 by Claire Arriaga RN  Outcome: Progressing  8/13/2024 1923 by Toña Parrish RCP  Outcome: Progressing  Goal: Able to breathe comfortably  8/13/2024 1923 by Toña Parrish RCP  Outcome: Progressing  Goal: Patient's breath sounds will be clear and equal  8/13/2024 1923 by Toña Parrish RCP  Outcome: Progressing  Goal: Adequate oxygenation  Description: Adequate oxygenation  8/13/2024 1923 by Toña Parrish RCP  Outcome: Progressing  Goal: Will be able to breathe spontaneously, without ventilator support  Description: Will be able to breathe spontaneously, without ventilator support  8/13/2024 1923 by Toña Parrish RCP  Outcome: Progressing     Problem: Skin/Tissue Integrity  Goal: Absence of new skin breakdown  Description: 1.  Monitor for areas of redness and/or skin breakdown  2.  Assess vascular access sites hourly  3.  Every 4-6 hours minimum:  Change oxygen saturation probe site  4.

## 2024-08-14 NOTE — PLAN OF CARE
Problem: Safety - Medical Restraint  Goal: Remains free of injury from restraints (Restraint for Interference with Medical Device)  Description: INTERVENTIONS:  1. Determine that other, less restrictive measures have been tried or would not be effective before applying the restraint  2. Evaluate the patient's condition at the time of restraint application  3. Inform patient/family regarding the reason for restraint  4. Q2H: Monitor safety, psychosocial status, comfort, nutrition and hydration  8/14/2024 1051 by Bella Gibbs RN  Outcome: Progressing  Flowsheets  Taken 8/14/2024 1000 by Bella Gibbs RN  Remains free of injury from restraints (restraint for interference with medical device): Evaluate the patient's condition at the time of restraint application  Taken 8/14/2024 0800 by Bella Gibbs RN  Remains free of injury from restraints (restraint for interference with medical device): Evaluate the patient's condition at the time of restraint application  Taken 8/14/2024 0600 by Claire Arriaga RN  Remains free of injury from restraints (restraint for interference with medical device):   Evaluate the patient's condition at the time of restraint application   Determine that other, less restrictive measures have been tried or would not be effective before applying the restraint   Inform patient/family regarding the reason for restraint   Every 2 hours: Monitor safety, psychosocial status, comfort, nutrition and hydration  Taken 8/14/2024 0400 by Claire Arriaga RN  Remains free of injury from restraints (restraint for interference with medical device):   Determine that other, less restrictive measures have been tried or would not be effective before applying the restraint   Evaluate the patient's condition at the time of restraint application   Inform patient/family regarding the reason for restraint   Every 2 hours: Monitor safety, psychosocial status, comfort, nutrition and  psychosocial status, comfort, nutrition and hydration  Taken 8/13/2024 1916 by Maggy Polk RN  Remains free of injury from restraints (restraint for interference with medical device):   Determine that other, less restrictive measures have been tried or would not be effective before applying the restraint   Evaluate the patient's condition at the time of restraint application   Inform patient/family regarding the reason for restraint   Every 2 hours: Monitor safety, psychosocial status, comfort, nutrition and hydration  Taken 8/13/2024 1800 by Maggy Polk RN  Remains free of injury from restraints (restraint for interference with medical device):   Determine that other, less restrictive measures have been tried or would not be effective before applying the restraint   Evaluate the patient's condition at the time of restraint application   Inform patient/family regarding the reason for restraint   Every 2 hours: Monitor safety, psychosocial status, comfort, nutrition and hydration  Taken 8/13/2024 1600 by Maggy Polk RN  Remains free of injury from restraints (restraint for interference with medical device):   Determine that other, less restrictive measures have been tried or would not be effective before applying the restraint   Evaluate the patient's condition at the time of restraint application   Inform patient/family regarding the reason for restraint   Every 2 hours: Monitor safety, psychosocial status, comfort, nutrition and hydration  Taken 8/13/2024 1400 by Maggy Polk RN  Remains free of injury from restraints (restraint for interference with medical device):   Determine that other, less restrictive measures have been tried or would not be effective before applying the restraint   Evaluate the patient's condition at the time of restraint application   Inform patient/family regarding the reason for restraint   Every 2 hours: Monitor safety, psychosocial status, comfort, nutrition and  Bella JOHNSON RN  Outcome: Progressing  8/14/2024 0140 by Claire Arriaga RN  Outcome: Progressing  Flowsheets (Taken 8/14/2024 0136)  Absence of Physical Injury: Implement safety measures based on patient assessment     Problem: Pain  Goal: Verbalizes/displays adequate comfort level or baseline comfort level  8/14/2024 1051 by Bella Gibbs RN  Outcome: Progressing  Flowsheets (Taken 8/14/2024 0715)  Verbalizes/displays adequate comfort level or baseline comfort level: Assess pain using appropriate pain scale  8/14/2024 0140 by Claire Arriaga RN  Outcome: Progressing  Flowsheets (Taken 8/13/2024 2000)  Verbalizes/displays adequate comfort level or baseline comfort level:   Encourage patient to monitor pain and request assistance   Administer analgesics based on type and severity of pain and evaluate response   Assess pain using appropriate pain scale   Implement non-pharmacological measures as appropriate and evaluate response

## 2024-08-15 ENCOUNTER — APPOINTMENT (OUTPATIENT)
Dept: GENERAL RADIOLOGY | Age: 74
End: 2024-08-15
Payer: MEDICARE

## 2024-08-15 ENCOUNTER — APPOINTMENT (OUTPATIENT)
Dept: INTERVENTIONAL RADIOLOGY/VASCULAR | Age: 74
End: 2024-08-15
Payer: MEDICARE

## 2024-08-15 PROBLEM — R90.89 ABNORMAL FINDING ON MRI OF BRAIN: Status: ACTIVE | Noted: 2024-08-15

## 2024-08-15 LAB
ALBUMIN SERPL-MCNC: 2.9 G/DL (ref 3.5–5.2)
ALP SERPL-CCNC: 75 U/L (ref 40–129)
ALT SERPL-CCNC: 117 U/L (ref 5–41)
AMMONIA PLAS-SCNC: 92 UMOL/L (ref 16–60)
ANION GAP SERPL CALCULATED.3IONS-SCNC: 11 MMOL/L (ref 9–17)
AST SERPL-CCNC: 112 U/L
BASOPHILS # BLD: 0 K/UL (ref 0–0.2)
BASOPHILS NFR BLD: 0 %
BILIRUB SERPL-MCNC: 1.7 MG/DL (ref 0.3–1.2)
BUN SERPL-MCNC: 46 MG/DL (ref 8–23)
BUN/CREAT SERPL: 38 (ref 9–20)
CALCIUM SERPL-MCNC: 8.8 MG/DL (ref 8.6–10.4)
CHLORIDE SERPL-SCNC: 111 MMOL/L (ref 98–107)
CO2 SERPL-SCNC: 20 MMOL/L (ref 20–31)
CREAT SERPL-MCNC: 1.2 MG/DL (ref 0.7–1.2)
EOSINOPHIL # BLD: 0 K/UL (ref 0–0.4)
EOSINOPHILS RELATIVE PERCENT: 0 % (ref 1–4)
ERYTHROCYTE [DISTWIDTH] IN BLOOD BY AUTOMATED COUNT: 14.1 % (ref 11.8–14.4)
FIO2: 30
GFR, ESTIMATED: 63 ML/MIN/1.73M2
GLUCOSE BLD-MCNC: 122 MG/DL (ref 75–110)
GLUCOSE BLD-MCNC: 145 MG/DL (ref 75–110)
GLUCOSE BLD-MCNC: 148 MG/DL (ref 75–110)
GLUCOSE BLD-MCNC: 155 MG/DL (ref 75–110)
GLUCOSE SERPL-MCNC: 172 MG/DL (ref 70–99)
HCT VFR BLD AUTO: 37.3 % (ref 40.7–50.3)
HGB BLD-MCNC: 12.9 G/DL (ref 13–17)
IMM GRANULOCYTES # BLD AUTO: 0.08 K/UL (ref 0–0.3)
IMM GRANULOCYTES NFR BLD: 1 %
INR PPP: 1.6
LYMPHOCYTES NFR BLD: 0.5 K/UL (ref 1–4.8)
LYMPHOCYTES RELATIVE PERCENT: 6 % (ref 24–44)
MCH RBC QN AUTO: 35.8 PG (ref 25.2–33.5)
MCHC RBC AUTO-ENTMCNC: 34.6 G/DL (ref 28.4–34.8)
MCV RBC AUTO: 103.6 FL (ref 82.6–102.9)
MICROORGANISM SPEC CULT: NORMAL
MICROORGANISM SPEC CULT: NORMAL
MODE: ABNORMAL
MONOCYTES NFR BLD: 0.25 K/UL (ref 0.2–0.8)
MONOCYTES NFR BLD: 3 % (ref 1–7)
NEGATIVE BASE EXCESS, ART: 1.6 MMOL/L (ref 0–2)
NEUTROPHILS NFR BLD: 90 % (ref 36–66)
NEUTS SEG NFR BLD: 7.57 K/UL (ref 1.8–7.7)
NRBC BLD-RTO: 0 PER 100 WBC
PLATELET # BLD AUTO: 65 K/UL (ref 138–453)
PMV BLD AUTO: 10.5 FL (ref 8.1–13.5)
POC HCO3: 23.5 MMOL/L (ref 21–28)
POC O2 SATURATION: 98.3 % (ref 94–98)
POC PCO2: 39.9 MM HG (ref 35–48)
POC PH: 7.38 (ref 7.35–7.45)
POC PO2: 112.1 MM HG (ref 83–108)
POTASSIUM SERPL-SCNC: 4.8 MMOL/L (ref 3.7–5.3)
PROT SERPL-MCNC: 5.3 G/DL (ref 6.4–8.3)
PROTHROMBIN TIME: 19.3 SEC (ref 11.5–14.2)
RBC # BLD AUTO: 3.6 M/UL (ref 4.21–5.77)
SAMPLE SITE: ABNORMAL
SERVICE CMNT-IMP: NORMAL
SERVICE CMNT-IMP: NORMAL
SODIUM SERPL-SCNC: 142 MMOL/L (ref 135–144)
SPECIMEN DESCRIPTION: NORMAL
SPECIMEN DESCRIPTION: NORMAL
WBC OTHER # BLD: 8.4 K/UL (ref 3.5–11.3)

## 2024-08-15 PROCEDURE — 85610 PROTHROMBIN TIME: CPT

## 2024-08-15 PROCEDURE — 94640 AIRWAY INHALATION TREATMENT: CPT

## 2024-08-15 PROCEDURE — 80053 COMPREHEN METABOLIC PANEL: CPT

## 2024-08-15 PROCEDURE — 6360000002 HC RX W HCPCS: Performed by: INTERNAL MEDICINE

## 2024-08-15 PROCEDURE — 94003 VENT MGMT INPAT SUBQ DAY: CPT

## 2024-08-15 PROCEDURE — 37799 UNLISTED PX VASCULAR SURGERY: CPT

## 2024-08-15 PROCEDURE — 71045 X-RAY EXAM CHEST 1 VIEW: CPT

## 2024-08-15 PROCEDURE — 85025 COMPLETE CBC W/AUTO DIFF WBC: CPT

## 2024-08-15 PROCEDURE — 2000000000 HC ICU R&B

## 2024-08-15 PROCEDURE — 6370000000 HC RX 637 (ALT 250 FOR IP): Performed by: FAMILY MEDICINE

## 2024-08-15 PROCEDURE — 6370000000 HC RX 637 (ALT 250 FOR IP): Performed by: INTERNAL MEDICINE

## 2024-08-15 PROCEDURE — 94761 N-INVAS EAR/PLS OXIMETRY MLT: CPT

## 2024-08-15 PROCEDURE — 2700000000 HC OXYGEN THERAPY PER DAY

## 2024-08-15 PROCEDURE — 99233 SBSQ HOSP IP/OBS HIGH 50: CPT | Performed by: PSYCHIATRY & NEUROLOGY

## 2024-08-15 PROCEDURE — 36415 COLL VENOUS BLD VENIPUNCTURE: CPT

## 2024-08-15 PROCEDURE — 2580000003 HC RX 258: Performed by: INTERNAL MEDICINE

## 2024-08-15 PROCEDURE — 82947 ASSAY GLUCOSE BLOOD QUANT: CPT

## 2024-08-15 PROCEDURE — 82140 ASSAY OF AMMONIA: CPT

## 2024-08-15 PROCEDURE — 2580000003 HC RX 258: Performed by: NURSE PRACTITIONER

## 2024-08-15 PROCEDURE — 82803 BLOOD GASES ANY COMBINATION: CPT

## 2024-08-15 RX ADMIN — BUDESONIDE 500 MCG: 0.5 SUSPENSION RESPIRATORY (INHALATION) at 19:26

## 2024-08-15 RX ADMIN — ARFORMOTEROL TARTRATE 15 MCG: 15 SOLUTION RESPIRATORY (INHALATION) at 07:16

## 2024-08-15 RX ADMIN — SODIUM CHLORIDE, PRESERVATIVE FREE 40 MG: 5 INJECTION INTRAVENOUS at 10:13

## 2024-08-15 RX ADMIN — IPRATROPIUM BROMIDE AND ALBUTEROL SULFATE 1 DOSE: 2.5; .5 SOLUTION RESPIRATORY (INHALATION) at 07:16

## 2024-08-15 RX ADMIN — SODIUM CHLORIDE, PRESERVATIVE FREE 10 ML: 5 INJECTION INTRAVENOUS at 21:21

## 2024-08-15 RX ADMIN — ARFORMOTEROL TARTRATE 15 MCG: 15 SOLUTION RESPIRATORY (INHALATION) at 19:26

## 2024-08-15 RX ADMIN — LACTULOSE 30 G: 20 SOLUTION ORAL at 10:12

## 2024-08-15 RX ADMIN — IPRATROPIUM BROMIDE AND ALBUTEROL SULFATE 1 DOSE: 2.5; .5 SOLUTION RESPIRATORY (INHALATION) at 19:26

## 2024-08-15 RX ADMIN — SODIUM CHLORIDE, PRESERVATIVE FREE 40 MG: 5 INJECTION INTRAVENOUS at 21:21

## 2024-08-15 RX ADMIN — WATER 60 MG: 1 INJECTION INTRAMUSCULAR; INTRAVENOUS; SUBCUTANEOUS at 04:25

## 2024-08-15 RX ADMIN — MIDODRINE HYDROCHLORIDE 5 MG: 5 TABLET ORAL at 13:13

## 2024-08-15 RX ADMIN — RIFAXIMIN 550 MG: 550 TABLET ORAL at 10:13

## 2024-08-15 RX ADMIN — LACTULOSE 30 G: 20 SOLUTION ORAL at 17:01

## 2024-08-15 RX ADMIN — WATER 40 MG: 1 INJECTION INTRAMUSCULAR; INTRAVENOUS; SUBCUTANEOUS at 18:41

## 2024-08-15 RX ADMIN — MIDODRINE HYDROCHLORIDE 5 MG: 5 TABLET ORAL at 17:01

## 2024-08-15 RX ADMIN — RIFAXIMIN 550 MG: 550 TABLET ORAL at 21:21

## 2024-08-15 RX ADMIN — LACTULOSE 30 G: 20 SOLUTION ORAL at 21:20

## 2024-08-15 RX ADMIN — MIDODRINE HYDROCHLORIDE 5 MG: 5 TABLET ORAL at 10:13

## 2024-08-15 RX ADMIN — IPRATROPIUM BROMIDE AND ALBUTEROL SULFATE 1 DOSE: 2.5; .5 SOLUTION RESPIRATORY (INHALATION) at 11:16

## 2024-08-15 RX ADMIN — CETIRIZINE HYDROCHLORIDE 10 MG: 10 TABLET, FILM COATED ORAL at 10:13

## 2024-08-15 RX ADMIN — IPRATROPIUM BROMIDE AND ALBUTEROL SULFATE 1 DOSE: 2.5; .5 SOLUTION RESPIRATORY (INHALATION) at 15:06

## 2024-08-15 RX ADMIN — BUDESONIDE 500 MCG: 0.5 SUSPENSION RESPIRATORY (INHALATION) at 07:16

## 2024-08-15 RX ADMIN — SODIUM CHLORIDE, PRESERVATIVE FREE 10 ML: 5 INJECTION INTRAVENOUS at 10:13

## 2024-08-15 ASSESSMENT — PULMONARY FUNCTION TESTS
PIF_VALUE: 21
PIF_VALUE: 19
PIF_VALUE: 23
PIF_VALUE: 22
PIF_VALUE: 26
PIF_VALUE: 21
PIF_VALUE: 20
PIF_VALUE: 22
PIF_VALUE: 20
PIF_VALUE: 20
PIF_VALUE: 22
PIF_VALUE: 20
PIF_VALUE: 17
PIF_VALUE: 23
PIF_VALUE: 19
PIF_VALUE: 24
PIF_VALUE: 21
PIF_VALUE: 20
PIF_VALUE: 23
PIF_VALUE: 20
PIF_VALUE: 22
PIF_VALUE: 29
PIF_VALUE: 20
PIF_VALUE: 21
PIF_VALUE: 20
PIF_VALUE: 20
PIF_VALUE: 23
PIF_VALUE: 22
PIF_VALUE: 19
PIF_VALUE: 20
PIF_VALUE: 24
PIF_VALUE: 24
PIF_VALUE: 20
PIF_VALUE: 20
PIF_VALUE: 23
PIF_VALUE: 21
PIF_VALUE: 17
PIF_VALUE: 19
PIF_VALUE: 19
PIF_VALUE: 23
PIF_VALUE: 20
PIF_VALUE: 20
PIF_VALUE: 25
PIF_VALUE: 23
PIF_VALUE: 29
PIF_VALUE: 21
PIF_VALUE: 23
PIF_VALUE: 20
PIF_VALUE: 22
PIF_VALUE: 22
PIF_VALUE: 23
PIF_VALUE: 15
PIF_VALUE: 18
PIF_VALUE: 20
PIF_VALUE: 23
PIF_VALUE: 22
PIF_VALUE: 24

## 2024-08-15 ASSESSMENT — PAIN SCALES - GENERAL
PAINLEVEL_OUTOF10: 0

## 2024-08-15 NOTE — PLAN OF CARE
Problem: Respiratory - Adult  Goal: Achieves optimal ventilation and oxygenation  8/15/2024 1930 by Eren Newsome RCP  Outcome: Progressing     Problem: Respiratory - Adult  Goal: Able to breathe comfortably  8/15/2024 1930 by Eren Newsome RCP  Outcome: Progressing     Problem: Respiratory - Adult  Goal: Patient's breath sounds will be clear and equal  8/15/2024 1930 by Eren Newsome RCP  Outcome: Progressing     Problem: Respiratory - Adult  Goal: Adequate oxygenation  Description: Adequate oxygenation  8/15/2024 1930 by Eren Newsome RCP  Outcome: Progressing     Problem: Respiratory - Adult  Goal: Will be able to breathe spontaneously, without ventilator support  Description: Will be able to breathe spontaneously, without ventilator support  8/15/2024 1930 by Eren Newsome RCP  Outcome: Progressing

## 2024-08-15 NOTE — PLAN OF CARE
Problem: Discharge Planning  Goal: Discharge to home or other facility with appropriate resources  8/14/2024 2159 by Angelica Russell RN  Outcome: Progressing  Flowsheets (Taken 8/14/2024 2000)  Discharge to home or other facility with appropriate resources: Identify barriers to discharge with patient and caregiver  8/14/2024 1051 by Bella Gibbs RN  Outcome: Progressing  Flowsheets (Taken 8/14/2024 0800)  Discharge to home or other facility with appropriate resources: Identify barriers to discharge with patient and caregiver     Problem: Safety - Medical Restraint  Goal: Remains free of injury from restraints (Restraint for Interference with Medical Device)  Description: INTERVENTIONS:  1. Determine that other, less restrictive measures have been tried or would not be effective before applying the restraint  2. Evaluate the patient's condition at the time of restraint application  3. Inform patient/family regarding the reason for restraint  4. Q2H: Monitor safety, psychosocial status, comfort, nutrition and hydration  8/14/2024 2159 by Angelica Russell RN  Outcome: Progressing  Flowsheets  Taken 8/14/2024 2000 by Angelica Russell RN  Remains free of injury from restraints (restraint for interference with medical device):   Determine that other, less restrictive measures have been tried or would not be effective before applying the restraint   Evaluate the patient's condition at the time of restraint application   Inform patient/family regarding the reason for restraint   Every 2 hours: Monitor safety, psychosocial status, comfort, nutrition and hydration  Taken 8/14/2024 1800 by Bella Gibbs RN  Remains free of injury from restraints (restraint for interference with medical device): Evaluate the patient's condition at the time of restraint application  Taken 8/14/2024 1645 by Bella Gibbs RN  Remains free of injury from restraints (restraint for interference with medical

## 2024-08-15 NOTE — CARE COORDINATION
Discharge planning    Remains intubated. CPAPing. MRI and EEG completed. On levo. Once extubated, PT/OT will eval. SNF vs HC. Lives with spouse. Hx of cirrhosis. Fell at home 2 weeks ago.

## 2024-08-15 NOTE — PROGRESS NOTES
Pulmonary Critical Care Progress Note    Patient seen for the follow up of COPD exacerbation (HCC)     Subjective:    He has tolerated CPAP 30% FiO2 today.  He is still unresponsive opens eyes does not follow commands off sedation he is on full vent support FiO2 30% tidal volume 550 rate of 18 PEEP of 8.  He tolerates tube feeds       Examination:    Vitals: /60   Pulse 58   Temp 97.3 °F (36.3 °C) (Temporal)   Resp 16   Ht 1.803 m (5' 11\")   Wt 92.4 kg (203 lb 11.3 oz)   SpO2 97%   BMI 28.41 kg/m²   SpO2  Av.8 %  Min: 96 %  Max: 100 %  General appearance: Intubated lethargic not following commands  Neck: No JVD  Lungs: Decreased breath sound no crackles or wheeze  Heart: regular rate and rhythm, S1, S2 normal, no gallop  Abdomen: Soft, non tender, + BS  Extremities: no cyanosis or clubbing. No significant edema    LABs:    CBC:   Recent Labs     24  0425 08/15/24  0440   WBC 6.5 8.4   HGB 12.5* 12.9*   HCT 36.8* 37.3*   PLT 52* 65*     BMP:   Recent Labs     245 08/15/24  0440    142   K 5.0 4.8   CO2 21 20   BUN 45* 46*   CREATININE 1.4* 1.2   LABGLOM 52* 63   GLUCOSE 121* 172*         LIVER PROFILE:  Recent Labs     245 08/15/24  0440   AST 40* 112*   ALT 53* 117*      Latest Reference Range & Units 24 04:29   POC HCO3 21.0 - 28.0 mmol/L 21.7   POC O2 SAT 94.0 - 98.0 % 98.2 (H)   POC pCO2 35.0 - 48.0 mm Hg 36.7   POC pH 7.350 - 7.450  7.379   POC PO2 83.0 - 108.0 mm Hg 110.0 (H)   (H): Data is abnormally high  Radiology:    Chest x-ray   Reviewed  Bibasilar atelectasis, mildly increased from previous exam. Cannot exclude trace bilateral pleural effusions.     Echocardiogram    Left Ventricle: Normal left ventricular systolic function with a visually estimated EF of 50 - 55%. Left ventricle size is normal. Normal wall thickness. Mild hypokinesis of the following segments: mid inferoseptal. Abnormal diastolic function.    Aortic Valve: Trileaflet valve.

## 2024-08-15 NOTE — PROGRESS NOTES
End Of Shift Note  [facility]  Summary of shift: Patient had uneventful shift, was opening eyes to speech and moving arms. Blood pressure was soft went up on levo now running at 4 mcg. Art line wave keeps in and out. Had 966 out in griffith. Had 2 BM liquid brown.Tube feed is at goal with residuals 10, 10, 10. Patient is following commands shaking head yes or no. Plan is for neuro to see patient and family today. Son resting in room with patient     Vitals:    Vitals:    08/15/24 0530 08/15/24 0542 08/15/24 0545 08/15/24 0600   BP:       Pulse: 63 67 57 58   Resp: 16 18 15 12   Temp:       TempSrc:       SpO2: 99% 99% 99% 99%   Weight:       Height:            I&O:   Intake/Output Summary (Last 24 hours) at 8/15/2024 0617  Last data filed at 8/15/2024 0616  Gross per 24 hour   Intake 868.32 ml   Output 2230 ml   Net -1361.68 ml       Resp Status: FiO2    Ventilator Settings:  Vent Mode: AC/PRVC Resp Rate (Set): 14 bpm/Vt (Set, mL): 550 mL/ /FiO2 : 30 %    Critical Care IV infusions:   [Held by provider] sodium chloride Stopped (08/13/24 1139)    dextrose      sodium chloride 10 mL/hr at 08/11/24 0628    midazolam 4 mg/hr (08/12/24 0450)    phenylephrine (REMI-SYNEPHRINE) 50 mg in sodium chloride 0.9 % 250 mL infusion 70 mcg/min (08/12/24 0450)    norepinephrine 4 mcg/min (08/15/24 0616)        LDA:   PICC 08/10/24 Left Brachial (Active)   Number of days: 4       Peripheral IV 08/09/24 Right Forearm (Active)   Number of days: 5       Peripheral IV 08/10/24 Distal;Left;Anterior Forearm (Active)   Number of days: 5       NG/OG/NJ/NE Tube Orogastric Center mouth (Active)   Number of days: 4       Urinary Catheter 08/10/24 Griffith (Active)   Number of days: 5       ETT  (Active)   Number of days: 5       Arterial Line 08/10/24 Right Radial (Active)   Number of days: 5

## 2024-08-15 NOTE — PROGRESS NOTES
[facility] Neurology   IN-PATIENT SERVICE      NEUROLOGY PROGRESS  NOTE            Date:   8/15/2024  Patient name:  Aiden Black  Date of admission:  8/9/2024  YOB: 1950      Interval History:     No acute events.  Per nursing staff, overnight and this morning, he has been opening his eyes and intermittently following commands.  Family at bedside also report that he appears more awake.  He remains intubated, currently undergoing CPAP trial.  MRI brain and EEG done.    History of Present Illness:     73 year old male with past medical history of emphysema/COPD, HTN, CAD, cirrhosis, who is currently admitted to ICU for COPD exacerbation s/p intubation/MV. Neurology consulted as patient remains minimally responsive despite being off sedation.  History limited given patient's current clinical status.  Obtained primarily from primary team, nursing staff, and family at bedside.     Patient has been admitted since 8/9/2024.  He initially presented to OSH for shortness of breath.  Diagnosed with COPD exacerbation.  He was given breathing treatments and had mild improvement in symptoms.  Subsequently transferred to [facility] for further evaluation.  Upon arrival, he was noted to be obtunded, unresponsive.  ABG demonstrated significant CO2 retention with pCO2 119.  He was initially started on BiPAP but continued to be hypercapnic and obtunded.  Subsequently, was intubated and started on mechanical ventilation.  He was maintained on sedation with Versed gtt and fentanyl PRN.  Versed gtt. was stopped 2 days ago.  He continues to be minimally responsive, does not follow commands, did not pass CPAP trial.  Per nursing staff, pupils and cranial nerve reflexes have been intact.  He does have intermittent spontaneous movements of all 4 extremities and withdraws to pain.  No seizures or seizure-like activity noticed.     He has no prior history of stroke, seizures.  Does not follow with neurology outpatient.  Per

## 2024-08-15 NOTE — PROGRESS NOTES
Progress note  [facility]    Adult Hospitalist      Name: Aiden Black  MRN: [id number]     Acct: [id number]  Room: [address] [place] [address]-    Admit Date: 8/9/2024  6:45 PM  PCP: Angelica Cruz MD    Primary Problem  Principal Problem:    COPD exacerbation (HCC)  Active Problems:    Hypoxia    Acute encephalopathy  Resolved Problems:    * No resolved hospital problems. *        Assesment/ plan:       Acute COPD exacerbation  IV Solu-Medrol  Duo nebs   Respiratory pathogen panel negative  Sputum culture   S/p IV ceftriaxone and Zithromax  Consult pulmonology - appreciate recs    Acute hypoxic and hypercarbic respiratory failure   Secondary to above   Mechanical ventilation sedation as per Critical Care   MRSA PCR negative  Neurology consulted for acute encephalopathy  Tube feeds started     Encephalopathy:  -MRI EEG done  - Discussed case with neuro - plans for LP. Discussed with family in conjunction with neuro. Questions answered. If required, will give platelets prior to procedure, per IR protocols  -Neurology following.    Shock, etiology unclear  Monitor blood pressure   Initially sammy and now levophed.   Midodrine tid  Infectious work up otherwise NTD.   IV fluids  Lactate 1.7  Procalcitonin 0.08  Blood culture NTD  Sputum culture    Hyperglycemia  - IV fluids switched to normal saline  - 400s-->122    Cirrhosis  Patient has had history of esophageal varices  Patient has OG tube.  Monitor for bleeding  PPI twice a day  Monitor LFTs  Currently on lactulose and Xifaxan   Liver ultrasound - cirrhotic liver and hepatomegaly  GI consult - no egd planned.   AFP 2.1  H/H - 11.9  PPI BID  LFTs trending up.   T bili trending down    Sinus Bradycardia  - cardiology following. No evidence of High grade AV block or conduction disease. Continue to monitor. Echo .     Goals of care  - palliative care consulted.       Continue to monitor/telemetry/CBC with differential daily/BMP daily  DVT and GI prophylaxis.  Continue  medications as below      Scheduled Meds:   insulin lispro  0-4 Units SubCUTAneous Q6H    midodrine  5 mg Oral TID WC    lactulose  30 g Oral TID    arformoterol tartrate  15 mcg Nebulization BID RT    ipratropium 0.5 mg-albuterol 2.5 mg  1 Dose Inhalation 4x Daily RT    sodium chloride flush  5-40 mL IntraVENous 2 times per day    methylPREDNISolone  60 mg IntraVENous Q8H    budesonide  0.5 mg Nebulization BID RT    rifAXIMin  550 mg Oral BID    cetirizine  10 mg Oral Daily    pantoprazole (PROTONIX) 40 mg in sodium chloride (PF) 0.9 % 10 mL injection  40 mg IntraVENous Q12H     Continuous Infusions:   [Held by provider] sodium chloride Stopped (08/13/24 1139)    dextrose      sodium chloride 10 mL/hr at 08/11/24 0628    midazolam 4 mg/hr (08/12/24 0450)    phenylephrine (REMI-SYNEPHRINE) 50 mg in sodium chloride 0.9 % 250 mL infusion 70 mcg/min (08/12/24 0450)    norepinephrine 4 mcg/min (08/15/24 0616)     PRN Meds:  glucose, 4 tablet, PRN  dextrose bolus, 125 mL, PRN   Or  dextrose bolus, 250 mL, PRN  glucagon (rDNA), 1 mg, PRN  dextrose, , Continuous PRN  potassium chloride, 20 mEq, PRN   Or  potassium chloride, 10 mEq, PRN  sodium chloride flush, 10 mL, PRN  sodium chloride, , PRN  magnesium sulfate, 1,000 mg, PRN  ondansetron, 4 mg, Q8H PRN   Or  ondansetron, 4 mg, Q6H PRN  polyethylene glycol, 17 g, Daily PRN  acetaminophen, 650 mg, Q6H PRN   Or  acetaminophen, 650 mg, Q6H PRN  albuterol, 2.5 mg, Q6H PRN  fentanNYL, 25 mcg, Q1H PRN  midazolam, 2 mg, Q1H PRN  sodium phosphate 15 mmol in sodium chloride 0.9 % 250 mL IVPB, 15 mmol, PRN        Chief Complaint:     Chief Complaint   Patient presents with    Shortness of Breath           Subjective:      Patient seen and examined at bedside.  Patient remains in the ICU.  Intubated, sedated, on IV pressors.    Wife and sons at bedside.   Discussed Neuro findings and plan for LP.     History of Present Illness:  Aiden Black is a 73 y.o.  male who presents with    RDW 14.2 14.2 14.1   PLT 56* 52* 65*   MPV 10.3 10.4 10.5     Chemistry:  Recent Labs     08/12/24  1255 08/12/24  2315 08/13/24  0343 08/13/24  1218 08/14/24  0425 08/15/24  0440   NA  --   --  143  --  143 142   K  --   --  4.6  --  5.0 4.8   CL  --   --  111*  --  112* 111*   CO2  --   --  20  --  21 20   GLUCOSE  --   --  129*  --  121* 172*   BUN  --   --  35*  --  45* 46*   CREATININE  --   --  1.2  --  1.4* 1.2   ANIONGAP  --   --  12  --  10 11   LABGLOM  --   --  63  --  52* 63   CALCIUM  --   --  8.7  --  8.7 8.8   PROBNP  --   --   --  275  --   --    TROPHS 10 11  --   --   --   --    MYOGLOBIN 237* 128*  --   --   --   --      [facility] Labs     08/12/24  1255 08/12/24  1614 08/13/24  0343 08/13/24  0702 08/13/24  2333 08/14/24  0425 08/14/24  0521 08/14/24  1210 08/14/24  1808 08/14/24  2320 08/15/24  0440 08/15/24  0536   TSH  --   --   --   --   --  0.33  --   --   --   --   --   --    AST  --   --  54*  --   --  40*  --   --   --   --  112*  --    ALT  --   --  55*  --   --  53*  --   --   --   --  117*  --    ALKPHOS  --   --  59  --   --  55  --   --   --   --  75  --    BILITOT  --   --  1.6*  --   --  1.5*  --   --   --   --  1.7*  --    AMMONIA 62*  --  58  --   --   --   --   --   --   --   --   --    POCGLU  --    < >  --    < > 127*  --  126* 116* 130* 153*  --  155*    < > = values in this interval not displayed.       Lab Results   Component Value Date    INR 1.4 08/10/2024    INR 1.2 (H) 02/22/2024    PROTIME 16.8 (H) 08/10/2024    PROTIME 12.1 (H) 02/22/2024       Lab Results   Component Value Date/Time    SPECIAL LH 10 ML 08/10/2024 12:30 PM     Lab Results   Component Value Date/Time    CULTURE NO GROWTH 4 DAYS 08/10/2024 12:30 PM       Lab Results   Component Value Date/Time    POCPH 7.378 08/15/2024 04:34 AM    POCPCO2 39.9 08/15/2024 04:34 AM    POCPO2 112.1 08/15/2024 04:34 AM    POCHCO3 23.5 08/15/2024 04:34 AM    NBEA 1.6 08/15/2024 04:34 AM    PBEA 1.6 08/11/2024 06:01 AM

## 2024-08-15 NOTE — PLAN OF CARE
Problem: Respiratory - Adult  Goal: Achieves optimal ventilation and oxygenation  8/15/2024 0721 by Marti Mccollum RCP  Outcome: Progressing     Problem: Respiratory - Adult  Goal: Able to breathe comfortably  8/15/2024 0721 by Marti Mccollum RCP  Outcome: Progressing     Problem: Respiratory - Adult  Goal: Patient's breath sounds will be clear and equal  8/15/2024 0721 by Marti Mccollum RCP  Outcome: Progressing     Problem: Respiratory - Adult  Goal: Adequate oxygenation  Description: Adequate oxygenation  8/15/2024 0721 by Marti Mccollum RCP  Outcome: Progressing     Problem: Respiratory - Adult  Goal: Will be able to breathe spontaneously, without ventilator support  Description: Will be able to breathe spontaneously, without ventilator support  8/15/2024 0721 by Marti Mccollum RCP  Outcome: Progressing

## 2024-08-15 NOTE — PROGRESS NOTES
GASTROENTEROLOGY NOTE       Patient:   Aiden Black   :    1950   Facility:   [facility]  Date:     8/15/2024  Consultant:   Pablo Roy MD, DO      SUBJECTIVE:    73 y.o. male admitted 2024 with Hypoxemia [R09.02]  COPD exacerbation (HCC) [J44.1]  Hypoxia [R09.02].      Patient remains on vent and pressors.  Chart reviewed.  Hb stable.            OBJECTIVE:   Vital Signs:  /60   Pulse 63   Temp 97.3 °F (36.3 °C) (Temporal)   Resp 27   Ht 1.803 m (5' 11\")   Wt 92.4 kg (203 lb 11.3 oz)   SpO2 96%   BMI 28.41 kg/m²      Physical Exam:   General appearance: Patient remains on vent.      Lab and Imaging Review   Recent Labs     24  1255 24  0343 24  0425 08/15/24  0440 08/15/24  0844 08/15/24  0900   WBC  --  7.4 6.5 8.4  --   --    HGB  --  13.3 12.5* 12.9*  --   --    MCV  --  102.9 104.2* 103.6*  --   --    PLT  --  56* 52* 65*  --   --    INR  --   --   --   --  1.6  --    NA  --  143 143 142  --   --    K  --  4.6 5.0 4.8  --   --    CL  --  111* 112* 111*  --   --    CO2  --  20 21 20  --   --    BUN  --  35* 45* 46*  --   --    CREATININE  --  1.2 1.4* 1.2  --   --    GLUCOSE  --  129* 121* 172*  --   --    CALCIUM  --  8.7 8.7 8.8  --   --    AST  --  54* 40* 112*  --   --    ALT  --  55* 53* 117*  --   --    ALKPHOS  --  59 55 75  --   --    BILITOT  --  1.6* 1.5* 1.7*  --   --    AMMONIA 62* 58  --   --   --  92*     Recent Labs     08/15/24  0844   INR 1.6   PROTIME 19.3*         Impression:    Copied last note from my partner, Dr. Foley:   Decompensated cirrhosis related.  History of gastric varices, and reportedly, esophageal varices.  Reference to his last EGD does not make mention of esophageal varices, however.  We can continue nasogastric tube for administration of lactulose and other medications.  Tube feeds are also acceptable at this time.  CODE STATUS noted.  We will take a less aggressive approach.  Discussed with wife and son

## 2024-08-15 NOTE — PLAN OF CARE
Problem: Discharge Planning  Goal: Discharge to home or other facility with appropriate resources  Outcome: Progressing  Flowsheets (Taken 8/15/2024 0800)  Discharge to home or other facility with appropriate resources: Identify barriers to discharge with patient and caregiver     Problem: Safety - Medical Restraint  Goal: Remains free of injury from restraints (Restraint for Interference with Medical Device)  Description: INTERVENTIONS:  1. Determine that other, less restrictive measures have been tried or would not be effective before applying the restraint  2. Evaluate the patient's condition at the time of restraint application  3. Inform patient/family regarding the reason for restraint  4. Q2H: Monitor safety, psychosocial status, comfort, nutrition and hydration  Outcome: Progressing  Flowsheets  Taken 8/15/2024 1200 by Bella Gibbs RN  Remains free of injury from restraints (restraint for interference with medical device): Determine that other, less restrictive measures have been tried or would not be effective before applying the restraint  Taken 8/15/2024 1000 by Bella Gibbs RN  Remains free of injury from restraints (restraint for interference with medical device): Determine that other, less restrictive measures have been tried or would not be effective before applying the restraint  Taken 8/15/2024 0800 by Bella Gibbs RN  Remains free of injury from restraints (restraint for interference with medical device): Determine that other, less restrictive measures have been tried or would not be effective before applying the restraint  Taken 8/15/2024 0600 by Angelica Russell RN  Remains free of injury from restraints (restraint for interference with medical device):   Determine that other, less restrictive measures have been tried or would not be effective before applying the restraint   Evaluate the patient's condition at the time of restraint application   Inform  JASMINA  Outcome: Progressing  Goal: Patient's breath sounds will be clear and equal  8/15/2024 0721 by Marti Mccollum RCP  Outcome: Progressing  Goal: Adequate oxygenation  Description: Adequate oxygenation  8/15/2024 0721 by Marti Mccollum RCP  Outcome: Progressing  Goal: Will be able to breathe spontaneously, without ventilator support  Description: Will be able to breathe spontaneously, without ventilator support  8/15/2024 0721 by Marti Mccollum RCP  Outcome: Progressing     Problem: Skin/Tissue Integrity  Goal: Absence of new skin breakdown  Description: 1.  Monitor for areas of redness and/or skin breakdown  2.  Assess vascular access sites hourly  3.  Every 4-6 hours minimum:  Change oxygen saturation probe site  4.  Every 4-6 hours:  If on nasal continuous positive airway pressure, respiratory therapy assess nares and determine need for appliance change or resting period.  Outcome: Progressing     Problem: ABCDS Injury Assessment  Goal: Absence of physical injury  Outcome: Progressing     Problem: Pain  Goal: Verbalizes/displays adequate comfort level or baseline comfort level  Outcome: Progressing  Flowsheets  Taken 8/15/2024 1115  Verbalizes/displays adequate comfort level or baseline comfort level: Assess pain using appropriate pain scale  Taken 8/15/2024 0800  Verbalizes/displays adequate comfort level or baseline comfort level: Assess pain using appropriate pain scale     Problem: Nutrition Deficit:  Goal: Optimize nutritional status  Outcome: Progressing      As medically able, pt to continue to consume 75% or > meals & snacks throughout LOS.Reassess 7 days

## 2024-08-16 ENCOUNTER — APPOINTMENT (OUTPATIENT)
Dept: GENERAL RADIOLOGY | Age: 74
End: 2024-08-16
Payer: MEDICARE

## 2024-08-16 ENCOUNTER — APPOINTMENT (OUTPATIENT)
Dept: INTERVENTIONAL RADIOLOGY/VASCULAR | Age: 74
End: 2024-08-16
Payer: MEDICARE

## 2024-08-16 LAB
ALBUMIN SERPL-MCNC: 2.6 G/DL (ref 3.5–5.2)
ALP SERPL-CCNC: 83 U/L (ref 40–129)
ALT SERPL-CCNC: 88 U/L (ref 5–41)
AMMONIA PLAS-SCNC: 127 UMOL/L (ref 16–60)
ANION GAP SERPL CALCULATED.3IONS-SCNC: 6 MMOL/L (ref 9–17)
ANION GAP SERPL CALCULATED.3IONS-SCNC: 6 MMOL/L (ref 9–17)
ANION GAP SERPL CALCULATED.3IONS-SCNC: 7 MMOL/L (ref 9–17)
APPEARANCE CSF: CLEAR
AST SERPL-CCNC: 56 U/L
BASOPHILS # BLD: 0 K/UL (ref 0–0.2)
BASOPHILS NFR BLD: 0 %
BILIRUB SERPL-MCNC: 1.5 MG/DL (ref 0.3–1.2)
BUN SERPL-MCNC: 46 MG/DL (ref 8–23)
BUN SERPL-MCNC: 48 MG/DL (ref 8–23)
BUN SERPL-MCNC: 48 MG/DL (ref 8–23)
BUN/CREAT SERPL: 42 (ref 9–20)
BUN/CREAT SERPL: 44 (ref 9–20)
BUN/CREAT SERPL: 44 (ref 9–20)
C GATTII+NEOFOR DNA CSF QL NAA+NON-PROBE: NOT DETECTED
CALCIUM SERPL-MCNC: 8.8 MG/DL (ref 8.6–10.4)
CASE NUMBER:: NORMAL
CHLORIDE SERPL-SCNC: 110 MMOL/L (ref 98–107)
CHLORIDE SERPL-SCNC: 111 MMOL/L (ref 98–107)
CHLORIDE SERPL-SCNC: 112 MMOL/L (ref 98–107)
CMV DNA CSF QL NAA+NON-PROBE: NOT DETECTED
CO2 SERPL-SCNC: 25 MMOL/L (ref 20–31)
CO2 SERPL-SCNC: 26 MMOL/L (ref 20–31)
CO2 SERPL-SCNC: 26 MMOL/L (ref 20–31)
COLOR CSF: YELLOW
CREAT SERPL-MCNC: 1.1 MG/DL (ref 0.7–1.2)
E COLI K1 DNA CSF QL NAA+NON-PROBE: NOT DETECTED
EOSINOPHIL # BLD: 0 K/UL (ref 0–0.4)
EOSINOPHILS RELATIVE PERCENT: 0 % (ref 1–4)
ERYTHROCYTE [DISTWIDTH] IN BLOOD BY AUTOMATED COUNT: 14.3 % (ref 11.8–14.4)
EV RNA CSF QL NAA+NON-PROBE: NOT DETECTED
GFR, ESTIMATED: 70 ML/MIN/1.73M2
GLUCOSE BLD-MCNC: 141 MG/DL (ref 75–110)
GLUCOSE BLD-MCNC: 155 MG/DL (ref 75–110)
GLUCOSE BLD-MCNC: 160 MG/DL (ref 75–110)
GLUCOSE BLD-MCNC: 161 MG/DL (ref 75–110)
GLUCOSE CSF-MCNC: 79 MG/DL (ref 40–70)
GLUCOSE SERPL-MCNC: 147 MG/DL (ref 70–99)
GLUCOSE SERPL-MCNC: 157 MG/DL (ref 70–99)
GLUCOSE SERPL-MCNC: 168 MG/DL (ref 70–99)
GP B STREP DNA CSF QL NAA+NON-PROBE: NOT DETECTED
HAEM INFLU DNA CSF QL NAA+NON-PROBE: NOT DETECTED
HCT VFR BLD AUTO: 34.3 % (ref 40.7–50.3)
HGB BLD-MCNC: 11.4 G/DL (ref 13–17)
HHV6 DNA CSF QL NAA+NON-PROBE: NOT DETECTED
HSV1 DNA CSF QL NAA+NON-PROBE: NOT DETECTED
HSV2 DNA CSF QL NAA+NON-PROBE: NOT DETECTED
IMM GRANULOCYTES # BLD AUTO: 0 K/UL (ref 0–0.3)
IMM GRANULOCYTES NFR BLD: 0 %
L MONOCYTOG DNA CSF QL NAA+NON-PROBE: NOT DETECTED
LYMPHOCYTES NFR BLD: 0.42 K/UL (ref 1–4.8)
LYMPHOCYTES RELATIVE PERCENT: 7 % (ref 24–44)
MCH RBC QN AUTO: 35 PG (ref 25.2–33.5)
MCHC RBC AUTO-ENTMCNC: 33.2 G/DL (ref 28.4–34.8)
MCV RBC AUTO: 105.2 FL (ref 82.6–102.9)
MONOCYTES NFR BLD: 0.42 K/UL (ref 0.2–0.8)
MONOCYTES NFR BLD: 7 % (ref 1–7)
N MEN DNA CSF QL NAA+NON-PROBE: NOT DETECTED
NEUTROPHILS NFR BLD: 86 % (ref 36–66)
NEUTS SEG NFR BLD: 5.16 K/UL (ref 1.8–7.7)
NRBC BLD-RTO: 0.3 PER 100 WBC
NUC CELL # FLD MANUAL: 2 CELLS/UL
PARECHOVIRUS A RNA CSF QL NAA+NON-PROBE: NOT DETECTED
PLATELET # BLD AUTO: 53 K/UL (ref 138–453)
PMV BLD AUTO: 10.9 FL (ref 8.1–13.5)
POTASSIUM SERPL-SCNC: 5.1 MMOL/L (ref 3.7–5.3)
POTASSIUM SERPL-SCNC: 5.4 MMOL/L (ref 3.7–5.3)
POTASSIUM SERPL-SCNC: 5.4 MMOL/L (ref 3.7–5.3)
PROT CSF-MCNC: 181 MG/DL (ref 15–45)
PROT SERPL-MCNC: 4.6 G/DL (ref 6.4–8.3)
RBC # BLD AUTO: 3.26 M/UL (ref 4.21–5.77)
RBC # FLD MANUAL: 1 CELLS/UL
S PNEUM DNA CSF QL NAA+NON-PROBE: NOT DETECTED
SODIUM SERPL-SCNC: 143 MMOL/L (ref 135–144)
SPECIMEN DESCRIPTION: NORMAL
SPECIMEN DESCRIPTION: NORMAL
SPECIMEN VOL CSF: 8 ML
SURGICAL PATHOLOGY REPORT: NORMAL
TUBE # CSF: 3
VZV DNA CSF QL NAA+NON-PROBE: NOT DETECTED
WBC OTHER # BLD: 6 K/UL (ref 3.5–11.3)
XANTHOCHROMIA CSF QL: PRESENT

## 2024-08-16 PROCEDURE — 82140 ASSAY OF AMMONIA: CPT

## 2024-08-16 PROCEDURE — 87483 CNS DNA AMP PROBE TYPE 12-25: CPT

## 2024-08-16 PROCEDURE — 86618 LYME DISEASE ANTIBODY: CPT

## 2024-08-16 PROCEDURE — 86592 SYPHILIS TEST NON-TREP QUAL: CPT

## 2024-08-16 PROCEDURE — 80048 BASIC METABOLIC PNL TOTAL CA: CPT

## 2024-08-16 PROCEDURE — 94761 N-INVAS EAR/PLS OXIMETRY MLT: CPT

## 2024-08-16 PROCEDURE — 82042 OTHER SOURCE ALBUMIN QUAN EA: CPT

## 2024-08-16 PROCEDURE — 2580000003 HC RX 258: Performed by: NURSE PRACTITIONER

## 2024-08-16 PROCEDURE — 6370000000 HC RX 637 (ALT 250 FOR IP): Performed by: INTERNAL MEDICINE

## 2024-08-16 PROCEDURE — 86788 WEST NILE VIRUS AB IGM: CPT

## 2024-08-16 PROCEDURE — 87070 CULTURE OTHR SPECIMN AEROBIC: CPT

## 2024-08-16 PROCEDURE — 2000000000 HC ICU R&B

## 2024-08-16 PROCEDURE — 6370000000 HC RX 637 (ALT 250 FOR IP): Performed by: FAMILY MEDICINE

## 2024-08-16 PROCEDURE — 6360000002 HC RX W HCPCS: Performed by: INTERNAL MEDICINE

## 2024-08-16 PROCEDURE — 87205 SMEAR GRAM STAIN: CPT

## 2024-08-16 PROCEDURE — 82945 GLUCOSE OTHER FLUID: CPT

## 2024-08-16 PROCEDURE — 2700000000 HC OXYGEN THERAPY PER DAY

## 2024-08-16 PROCEDURE — 36415 COLL VENOUS BLD VENIPUNCTURE: CPT

## 2024-08-16 PROCEDURE — 94003 VENT MGMT INPAT SUBQ DAY: CPT

## 2024-08-16 PROCEDURE — 84157 ASSAY OF PROTEIN OTHER: CPT

## 2024-08-16 PROCEDURE — 009U3ZX DRAINAGE OF SPINAL CANAL, PERCUTANEOUS APPROACH, DIAGNOSTIC: ICD-10-PCS | Performed by: RADIOLOGY

## 2024-08-16 PROCEDURE — 83916 OLIGOCLONAL BANDS: CPT

## 2024-08-16 PROCEDURE — 82164 ANGIOTENSIN I ENZYME TEST: CPT

## 2024-08-16 PROCEDURE — 85025 COMPLETE CBC W/AUTO DIFF WBC: CPT

## 2024-08-16 PROCEDURE — 88108 CYTOPATH CONCENTRATE TECH: CPT

## 2024-08-16 PROCEDURE — 82040 ASSAY OF SERUM ALBUMIN: CPT

## 2024-08-16 PROCEDURE — 89050 BODY FLUID CELL COUNT: CPT

## 2024-08-16 PROCEDURE — 6370000000 HC RX 637 (ALT 250 FOR IP): Performed by: NURSE PRACTITIONER

## 2024-08-16 PROCEDURE — 82784 ASSAY IGA/IGD/IGG/IGM EACH: CPT

## 2024-08-16 PROCEDURE — 62328 DX LMBR SPI PNXR W/FLUOR/CT: CPT

## 2024-08-16 PROCEDURE — 80053 COMPREHEN METABOLIC PANEL: CPT

## 2024-08-16 PROCEDURE — 87327 CRYPTOCOCCUS NEOFORM AG IA: CPT

## 2024-08-16 PROCEDURE — 86789 WEST NILE VIRUS ANTIBODY: CPT

## 2024-08-16 PROCEDURE — 2709999900 IR LUMBAR PUNCTURE FOR DIAGNOSIS

## 2024-08-16 PROCEDURE — 99232 SBSQ HOSP IP/OBS MODERATE 35: CPT | Performed by: PSYCHIATRY & NEUROLOGY

## 2024-08-16 PROCEDURE — 94640 AIRWAY INHALATION TREATMENT: CPT

## 2024-08-16 PROCEDURE — 82947 ASSAY GLUCOSE BLOOD QUANT: CPT

## 2024-08-16 PROCEDURE — 71045 X-RAY EXAM CHEST 1 VIEW: CPT

## 2024-08-16 PROCEDURE — 2580000003 HC RX 258: Performed by: INTERNAL MEDICINE

## 2024-08-16 RX ORDER — CETIRIZINE HYDROCHLORIDE 10 MG/1
10 TABLET ORAL DAILY
Status: DISCONTINUED | OUTPATIENT
Start: 2024-08-17 | End: 2024-08-21

## 2024-08-16 RX ORDER — LACTULOSE 10 G/15ML
30 SOLUTION ORAL 3 TIMES DAILY
Status: DISCONTINUED | OUTPATIENT
Start: 2024-08-16 | End: 2024-08-16

## 2024-08-16 RX ORDER — MIDODRINE HYDROCHLORIDE 5 MG/1
5 TABLET ORAL
Status: DISCONTINUED | OUTPATIENT
Start: 2024-08-16 | End: 2024-08-21

## 2024-08-16 RX ORDER — LACTULOSE 10 G/15ML
30 SOLUTION ORAL EVERY 6 HOURS SCHEDULED
Status: DISCONTINUED | OUTPATIENT
Start: 2024-08-16 | End: 2024-08-19

## 2024-08-16 RX ADMIN — LACTULOSE 30 G: 10 SOLUTION ORAL at 14:20

## 2024-08-16 RX ADMIN — WATER 40 MG: 1 INJECTION INTRAMUSCULAR; INTRAVENOUS; SUBCUTANEOUS at 04:08

## 2024-08-16 RX ADMIN — SODIUM CHLORIDE, PRESERVATIVE FREE 10 ML: 5 INJECTION INTRAVENOUS at 20:01

## 2024-08-16 RX ADMIN — WATER 40 MG: 1 INJECTION INTRAMUSCULAR; INTRAVENOUS; SUBCUTANEOUS at 18:17

## 2024-08-16 RX ADMIN — SODIUM CHLORIDE, PRESERVATIVE FREE 40 MG: 5 INJECTION INTRAVENOUS at 20:01

## 2024-08-16 RX ADMIN — ARFORMOTEROL TARTRATE 15 MCG: 15 SOLUTION RESPIRATORY (INHALATION) at 07:22

## 2024-08-16 RX ADMIN — BUDESONIDE 500 MCG: 0.5 SUSPENSION RESPIRATORY (INHALATION) at 07:12

## 2024-08-16 RX ADMIN — IPRATROPIUM BROMIDE AND ALBUTEROL SULFATE 1 DOSE: 2.5; .5 SOLUTION RESPIRATORY (INHALATION) at 11:01

## 2024-08-16 RX ADMIN — LACTULOSE 30 G: 20 SOLUTION ORAL at 09:50

## 2024-08-16 RX ADMIN — MIDODRINE HYDROCHLORIDE 5 MG: 5 TABLET ORAL at 16:34

## 2024-08-16 RX ADMIN — RIFAXIMIN 550 MG: 550 TABLET ORAL at 20:01

## 2024-08-16 RX ADMIN — LACTULOSE 30 G: 20 SOLUTION ORAL at 18:18

## 2024-08-16 RX ADMIN — SODIUM ZIRCONIUM CYCLOSILICATE 10 G: 10 POWDER, FOR SUSPENSION ORAL at 06:37

## 2024-08-16 RX ADMIN — CETIRIZINE HYDROCHLORIDE 10 MG: 10 TABLET, FILM COATED ORAL at 09:50

## 2024-08-16 RX ADMIN — RIFAXIMIN 550 MG: 550 TABLET ORAL at 09:50

## 2024-08-16 RX ADMIN — SODIUM CHLORIDE, PRESERVATIVE FREE 40 MG: 5 INJECTION INTRAVENOUS at 09:50

## 2024-08-16 RX ADMIN — IPRATROPIUM BROMIDE AND ALBUTEROL SULFATE 1 DOSE: 2.5; .5 SOLUTION RESPIRATORY (INHALATION) at 19:06

## 2024-08-16 RX ADMIN — WATER 40 MG: 1 INJECTION INTRAMUSCULAR; INTRAVENOUS; SUBCUTANEOUS at 09:50

## 2024-08-16 RX ADMIN — ARFORMOTEROL TARTRATE 15 MCG: 15 SOLUTION RESPIRATORY (INHALATION) at 19:06

## 2024-08-16 RX ADMIN — SODIUM ZIRCONIUM CYCLOSILICATE 10 G: 10 POWDER, FOR SUSPENSION ORAL at 16:34

## 2024-08-16 RX ADMIN — MIDODRINE HYDROCHLORIDE 5 MG: 5 TABLET ORAL at 12:38

## 2024-08-16 RX ADMIN — IPRATROPIUM BROMIDE AND ALBUTEROL SULFATE 1 DOSE: 2.5; .5 SOLUTION RESPIRATORY (INHALATION) at 07:12

## 2024-08-16 RX ADMIN — IPRATROPIUM BROMIDE AND ALBUTEROL SULFATE 1 DOSE: 2.5; .5 SOLUTION RESPIRATORY (INHALATION) at 14:47

## 2024-08-16 RX ADMIN — BUDESONIDE 500 MCG: 0.5 SUSPENSION RESPIRATORY (INHALATION) at 19:06

## 2024-08-16 RX ADMIN — MIDODRINE HYDROCHLORIDE 5 MG: 5 TABLET ORAL at 09:50

## 2024-08-16 RX ADMIN — SODIUM CHLORIDE, PRESERVATIVE FREE 10 ML: 5 INJECTION INTRAVENOUS at 09:51

## 2024-08-16 ASSESSMENT — PULMONARY FUNCTION TESTS
PIF_VALUE: 20
PIF_VALUE: 25
PIF_VALUE: 22
PIF_VALUE: 24
PIF_VALUE: 22
PIF_VALUE: 22
PIF_VALUE: 23
PIF_VALUE: 22
PIF_VALUE: 23
PIF_VALUE: 17
PIF_VALUE: 23
PIF_VALUE: 26
PIF_VALUE: 23
PIF_VALUE: 22
PIF_VALUE: 22
PIF_VALUE: 24
PIF_VALUE: 22
PIF_VALUE: 23
PIF_VALUE: 18
PIF_VALUE: 22

## 2024-08-16 ASSESSMENT — PAIN SCALES - GENERAL
PAINLEVEL_OUTOF10: 0

## 2024-08-16 NOTE — PROGRESS NOTES
Progress note  [facility]    Adult Hospitalist      Name: Aiden Black  MRN: [id number]     Acct: [id number]  Room: [address] [place] [address]-    Admit Date: 8/9/2024  6:45 PM  PCP: Angelica Cruz MD    Primary Problem  Principal Problem:    COPD exacerbation (HCC)  Active Problems:    Hypoxia    Acute encephalopathy    Abnormal finding on MRI of brain  Resolved Problems:    * No resolved hospital problems. *        Assesment/ plan:       Acute COPD exacerbation  IV Solu-Medrol  Duo nebs   Respiratory pathogen panel negative  Sputum culture   S/p IV ceftriaxone and Zithromax  Consult pulmonology - appreciate recs    Acute hypoxic and hypercarbic respiratory failure   Secondary to above   Mechanical ventilation sedation as per Critical Care   MRSA PCR negative  Neurology consulted for acute encephalopathy  Tube feeds started     Encephalopathy:  -MRI EEG done  - Discussed case with neuro - plans for LP. Discussed with family in conjunction with neuro. Questions answered. If required, will give platelets prior to procedure, per IR protocols  -Neurology following.  Physical restraints for one day    Shock, etiology unclear  Monitor blood pressure   Initially sammy and now levophed.   Midodrine tid  Infectious work up otherwise NTD.   IV fluids  Lactate 1.7  Procalcitonin 0.08  Blood culture NTD  Sputum culture    Hyperglycemia  - IV fluids switched to normal saline  - 400s-->122    Cirrhosis  Patient has had history of esophageal varices  Patient has OG tube.  Monitor for bleeding  PPI twice a day  Monitor LFTs  Currently on lactulose and Xifaxan   Liver ultrasound - cirrhotic liver and hepatomegaly  GI consult - no egd planned.   AFP 2.1  H/H - 11.9  PPI BID  LFTs trending up.   T bili trending down    Sinus Bradycardia  - cardiology following. No evidence of High grade AV block or conduction disease. Continue to monitor. Echo .     Goals of care  - palliative care consulted.       Wife at bedside  Continue to

## 2024-08-16 NOTE — PROGRESS NOTES
Physical Therapy  DATE: 2024    NAME: Aiden Black  MRN: [id number]   : 1950    Patient not seen this date for Physical Therapy due to:      [] Cancel by RN or physician due to:    [] Hemodialysis    [] Critical Lab Value Level     [] Blood transfusion in progress    [] Acute or unstable cardiovascular status   _MAP < 55 or more than >115  _HR < 40 or > 130    [] Acute or unstable pulmonary status   -FiO2 > 60%   _RR < 5 or >40    _O2 sats < 85%    [] Strict Bedrest    [] Off Unit for surgery or procedure    [] Off Unit for testing       [] Pending imaging to R/O fracture    [] Refusal by Patient      [x] Other: Patient intubated      [] PT being discontinued at this time. Patient independent. No further needs.     [] PT being discontinued at this time as the patient has been transferred to hospice care. No further needs.      Dee Dee Montelongo, PT

## 2024-08-16 NOTE — PLAN OF CARE
Problem: Discharge Planning  Goal: Discharge to home or other facility with appropriate resources  Outcome: Progressing  Flowsheets (Taken 8/16/2024 0800)  Discharge to home or other facility with appropriate resources:   Identify barriers to discharge with patient and caregiver   Arrange for needed discharge resources and transportation as appropriate   Identify discharge learning needs (meds, wound care, etc)   Refer to discharge planning if patient needs post-hospital services based on physician order or complex needs related to functional status, cognitive ability or social support system     Problem: Safety - Medical Restraint  Goal: Remains free of injury from restraints (Restraint for Interference with Medical Device)  Description: INTERVENTIONS:  1. Determine that other, less restrictive measures have been tried or would not be effective before applying the restraint  2. Evaluate the patient's condition at the time of restraint application  3. Inform patient/family regarding the reason for restraint  4. Q2H: Monitor safety, psychosocial status, comfort, nutrition and hydration  Outcome: Progressing  Flowsheets  Taken 8/16/2024 1400 by Teresa Marroquin RN  Remains free of injury from restraints (restraint for interference with medical device): Determine that other, less restrictive measures have been tried or would not be effective before applying the restraint  Taken 8/16/2024 1200 by Teresa Marroquin RN  Remains free of injury from restraints (restraint for interference with medical device): Determine that other, less restrictive measures have been tried or would not be effective before applying the restraint  Taken 8/16/2024 1000 by Teresa Marroquin RN  Remains free of injury from restraints (restraint for interference with medical device): Determine that other, less restrictive measures have been tried or would not be effective before applying the restraint  Taken 8/16/2024 0800 by Teresa Marroquin RN  Remains  Verbalizes/displays adequate comfort level or baseline comfort level  Outcome: Progressing     Problem: Nutrition Deficit:  Goal: Optimize nutritional status  Outcome: Progressing

## 2024-08-16 NOTE — PLAN OF CARE
Problem: Respiratory - Adult  Goal: Achieves optimal ventilation and oxygenation  8/16/2024 0830 by Vereniec Montoya RCP  Outcome: Progressing  8/15/2024 2309 by Angelica Russell RN  Outcome: Progressing  8/15/2024 1930 by Eren Newsome RCP  Outcome: Progressing  Goal: Able to breathe comfortably  8/16/2024 0830 by Verenice Montoya RCP  Outcome: Progressing  8/15/2024 1930 by Eren Newsome RCP  Outcome: Progressing  Goal: Patient's breath sounds will be clear and equal  8/16/2024 0830 by Verenice Montoya RCP  Outcome: Progressing  8/15/2024 1930 by Eren Newsome RCP  Outcome: Progressing  Goal: Adequate oxygenation  Description: Adequate oxygenation  8/16/2024 0830 by Verenice Montoya RCP  Outcome: Progressing  8/15/2024 1930 by Eren Newsome RCP  Outcome: Progressing  Goal: Will be able to breathe spontaneously, without ventilator support  Description: Will be able to breathe spontaneously, without ventilator support  8/16/2024 0830 by Verenice Montoya RCP  Outcome: Progressing  8/15/2024 1930 by Eren Newsome RCP  Outcome: Progressing

## 2024-08-16 NOTE — PROGRESS NOTES
GASTROENTEROLOGY NOTE       Patient:   Aiden Black   :    1950   Facility:   [facility]  Date:     2024  Consultant:   JUDY James CNP      SUBJECTIVE:    Family at bedside.  Awake.  Patient able to follow some commands.  Remains intubated.        OBJECTIVE:   Vital Signs:  BP (!) 112/57   Pulse 65   Temp 97.7 °F (36.5 °C) (Temporal)   Resp 30   Ht 1.803 m (5' 11\")   Wt 92.1 kg (203 lb 0.7 oz)   SpO2 98%   BMI 28.32 kg/m²      Physical Exam:   General appearance: Alert, NAD  Lungs: Diminished bilaterally, unlabored pattern, intubated  Heart: S1S2, RRR  Abdomen: Soft, NT, ND +BS  Skin/Musculoskeletal:  No jaundice.     Lab and Imaging Review   Recent Labs     24  0425 08/15/24  0440 08/15/24  0844 08/15/24  0900 24  0412   WBC 6.5 8.4  --   --  6.0   HGB 12.5* 12.9*  --   --  11.4*   .2* 103.6*  --   --  105.2*   PLT 52* 65*  --   --  53*   INR  --   --  1.6  --   --     142  --   --  143   K 5.0 4.8  --   --  5.4*   * 111*  --   --  112*   CO2 21 20  --   --  25   BUN 45* 46*  --   --  46*   CREATININE 1.4* 1.2  --   --  1.1   GLUCOSE 121* 172*  --   --  147*   CALCIUM 8.7 8.8  --   --  8.8   AST 40* 112*  --   --  56*   ALT 53* 117*  --   --  88*   ALKPHOS 55 75  --   --  83   BILITOT 1.5* 1.7*  --   --  1.5*   AMMONIA  --   --   --  92* 127*     Recent Labs     08/15/24  0844   INR 1.6   PROTIME 19.3*         Impression:    Decompensated cirrhosis      PLAN:    Continue Lactulose, Xifaxan, and Protonix  Eventual EGD d/t hx gastric varices.  Will consider as outpatient, unless needed sooner.  Should f/u with [facility], where he is established.    Call GI as needed.       Althea Sanchez, CNP

## 2024-08-16 NOTE — PROGRESS NOTES
Patient potassium came back at 5.4 and ammonia at 127 messaged on call NP, liver enzymes are still elevated on lactulose 3x a day.

## 2024-08-16 NOTE — PROGRESS NOTES
End Of Shift Note  [place] [facility]  Summary of shift: patient had uneventful shift, would follow commands, no BM, 825 out in urine. Patient had 10, 10, 10 for residuals. Gave patient lokelma for potassium of 5.4. Patient wife and patient resting in room     Vitals:    Vitals:    08/16/24 0515 08/16/24 0530 08/16/24 0545 08/16/24 0600   BP:  (!) 106/57  (!) 113/57   Pulse: 59 59 61 66   Resp: 16 17 17 19   Temp:       TempSrc:       SpO2: 99% 99% 99% 100%   Weight:  92.1 kg (203 lb 0.7 oz)     Height:            I&O:   Intake/Output Summary (Last 24 hours) at 8/16/2024 0616  Last data filed at 8/16/2024 0612  Gross per 24 hour   Intake 120 ml   Output 1750 ml   Net -1630 ml       Resp Status: Ventilator     Ventilator Settings:  Vent Mode: AC/PRVC Resp Rate (Set): 14 bpm/Vt (Set, mL): 550 mL/ /FiO2 : 30 %    Critical Care IV infusions:   [Held by provider] sodium chloride Stopped (08/13/24 1139)    dextrose      sodium chloride 10 mL/hr at 08/11/24 0628    midazolam 4 mg/hr (08/12/24 0450)    norepinephrine 4 mcg/min (08/15/24 0616)        LDA:   PICC 08/10/24 Left Brachial (Active)   Number of days: 5       Peripheral IV 08/09/24 Right Forearm (Active)   Number of days: 6       Peripheral IV 08/10/24 Distal;Left;Anterior Forearm (Active)   Number of days: 6       NG/OG/NJ/NE Tube Orogastric Center mouth (Active)   Number of days: 5       Urinary Catheter 08/10/24 Roca (Active)   Number of days: 6       ETT  (Active)   Number of days: 6

## 2024-08-16 NOTE — PROGRESS NOTES
Pulmonary Critical Care Progress Note    Patient seen for the follow up of COPD exacerbation (HCC)     Subjective:    He is tolerated CPAP 8 cm H2O 30% FiO2 today pressure support of 10.  He is more unresponsive opens eyes and follow commands.  He was on full vent support FiO2 30% tidal volume 550 rate of 18 PEEP of 8.  He tolerates tube feeds   .  LP was done    Examination:    Vitals: BP (!) 116/56   Pulse 64   Temp 97.7 °F (36.5 °C) (Temporal)   Resp 24   Ht 1.803 m (5' 11\")   Wt 92.1 kg (203 lb 0.7 oz)   SpO2 99%   BMI 28.32 kg/m²   SpO2  Av.3 %  Min: 91 %  Max: 100 %  General appearance: Intubated lethargic eyes open  Neck: No JVD  Lungs: Decreased breath sound no crackles or wheeze  Heart: regular rate and rhythm, S1, S2 normal, no gallop  Abdomen: Soft, non tender, + BS  Extremities: no cyanosis or clubbing. No significant edema    LABs:    CBC:   Recent Labs     08/15/24  0440 24  0412   WBC 8.4 6.0   HGB 12.9* 11.4*   HCT 37.3* 34.3*   PLT 65* 53*     BMP:   Recent Labs     24  0412 24  1400    143   K 5.4* 5.4*   CO2 25 26   BUN 46* 48*   CREATININE 1.1 1.1   LABGLOM 70 70   GLUCOSE 147* 168*         LIVER PROFILE:  Recent Labs     08/15/24  0440 24  0412   * 56*   * 88*      Latest Reference Range & Units 24 04:29   POC HCO3 21.0 - 28.0 mmol/L 21.7   POC O2 SAT 94.0 - 98.0 % 98.2 (H)   POC pCO2 35.0 - 48.0 mm Hg 36.7   POC pH 7.350 - 7.450  7.379   POC PO2 83.0 - 108.0 mm Hg 110.0 (H)   (H): Data is abnormally high     Latest Reference Range & Units 24 12:55 24 03:43 08/15/24 09:00 24 04:12   Ammonia 16 - 60 umol/L 62 (H) 58 92 (H) 127 (HH)   (HH): Data is critically high  (H): Data is abnormally high    Radiology:    Chest x-ray   Reviewed  1. No acute cardiopulmonary process.  2. Support devices are stable in position.     Echocardiogram    Left Ventricle: Normal left ventricular systolic function with a visually  estimated EF of 50 - 55%. Left ventricle size is normal. Normal wall thickness. Mild hypokinesis of the following segments: mid inferoseptal. Abnormal diastolic function.    Aortic Valve: Trileaflet valve.    Mitral Valve: Mildly thickened leaflets. Mild regurgitation.    Image quality is fair.       Impression/recommendations;    Acute hypoxic and hypercarbic respiratory failure requiring intubation  Continue assist-control ventilation FiO2 30% PEEP of 8 respiratory 18 tidal volume 550  CPAP trial as tolerated/resume vent support at night     Monitor off Versed drip  Versed 2 mg IV as needed every hour  Fentanyl 25 every hour as needed for pain  Sedation vacation  Tube feeds as tolerated     COPD exacerbation  DuoNeb by nebulizer every 4 hours  Pulmicort 0.5 twice daily by nebulizer  Brovana by nebulizer twice daily  Finished Rocephin/off Zithromax  Solu-Medrol 40 IV every 8 hours       Hypotension requiring pressors/bradycardia  Monitor blood pressure off Levophed.  Cardiology consult     liver cirrhosis history esophageal varices/thrombocytopenia  GI on consult no EGD planned for now  Monitor OG output/monitor hemoglobin hematocrit  Increase lactulose to 30 g 4 times daily/continue Xifaxan/monitor ammonia  Monitor platelet count       CO2 narcosis/possible encephalopathy with normalized ammonia  Monitor mental status off sedation  Neurology on consult  Check LP results         Hyperglycemia on steroids  Monitor blood sugars  Insulin scale    suspected sleep apnea   Outpatient sleep evaluation     discussed again today with wife and sister-in-law at bedside again today updated on above  I previously advised to address CODE STATUS patient did not want prolonged ventilation  Discussed with RN and RT    Peptic ulcer disease prophylaxis with Protonix 40 IV twice daily  DVT prophylaxis EPC    Sathish Bañuelos MD, MD, [facility]  Pulmonary Critical Care and [facility] MedicinePremier Health  Cell: [phone]  Office:

## 2024-08-16 NOTE — PROGRESS NOTES
[facility] Neurology   IN-PATIENT SERVICE      NEUROLOGY PROGRESS  NOTE            Date:   8/16/2024  Patient name:  Aiden Black  Date of admission:  8/9/2024  YOB: 1950      Interval History:     8/16: LP done today.  Results pending.  He has been more awake, has been following commands.  Remains intubated.  Family at bedside also agree that he is improved.    8/15: No acute events.  Per nursing staff, overnight and this morning, he has been opening his eyes and intermittently following commands.  Family at bedside also report that he appears more awake.  He remains intubated, currently undergoing CPAP trial.  MRI brain and EEG done.    History of Present Illness:     73 year old male with past medical history of emphysema/COPD, HTN, CAD, cirrhosis, who is currently admitted to ICU for COPD exacerbation s/p intubation/MV. Neurology consulted as patient remains minimally responsive despite being off sedation.  History limited given patient's current clinical status.  Obtained primarily from primary team, nursing staff, and family at bedside.     Patient has been admitted since 8/9/2024.  He initially presented to OSH for shortness of breath.  Diagnosed with COPD exacerbation.  He was given breathing treatments and had mild improvement in symptoms.  Subsequently transferred to [facility] for further evaluation.  Upon arrival, he was noted to be obtunded, unresponsive.  ABG demonstrated significant CO2 retention with pCO2 119.  He was initially started on BiPAP but continued to be hypercapnic and obtunded.  Subsequently, was intubated and started on mechanical ventilation.  He was maintained on sedation with Versed gtt and fentanyl PRN.  Versed gtt. was stopped 2 days ago.  He continues to be minimally responsive, does not follow commands, did not pass CPAP trial.  Per nursing staff, pupils and cranial nerve reflexes have been intact.  He does have intermittent spontaneous movements of all 4  subarachnoid hemorrhage is  difficult to entirely exclude in the appropriate clinical setting.    Fluid in the nasopharynx for a decreased state of consciousness.  Partial  opacification of the mastoid air cells.    The marrow signal of the clivus and upper cervical vertebral bodies are low  on T1-weighted images.  This is a nonspecific finding.  This sometimes can be  normal in a young patient.  Sometimes anemia or marrow infiltrative processes  can have this finding.  A patient in an immunocompromised state can have this  appearance to the marrow. Clinically correlate.        Results for orders placed during the hospital encounter of 08/09/24    CT HEAD WO CONTRAST    Narrative  EXAMINATION:  CT OF THE HEAD WITHOUT CONTRAST  8/10/2024 2:40 am    TECHNIQUE:  CT of the head was performed without the administration of intravenous  contrast. Automated exposure control, iterative reconstruction, and/or weight  based adjustment of the mA/kV was utilized to reduce the radiation dose to as  low as reasonably achievable.    COMPARISON:  None.    HISTORY:  ORDERING SYSTEM PROVIDED HISTORY: lethargy  TECHNOLOGIST PROVIDED HISTORY:  lethargy      FINDINGS:  BRAIN/VENTRICLES: There is no acute intracranial hemorrhage, mass effect or  midline shift.  No abnormal extra-axial fluid collection.  The gray-white  differentiation is maintained without evidence of an acute infarct.  There is  no evidence of hydrocephalus. Moderate diffuse cerebral atrophy and mild  chronic white matter ischemic change.    ORBITS: The visualized portion of the orbits demonstrate no acute abnormality.    SINUSES: The visualized paranasal sinuses and mastoid air cells demonstrate  no acute abnormality.    SOFT TISSUES/SKULL:  No acute abnormality of the visualized skull or soft  tissues.    Impression  No acute intracranial abnormality.          I personally reviewed all of the above medications, clinical laboratory, imaging and other diagnostic tests.

## 2024-08-16 NOTE — PLAN OF CARE
Problem: Discharge Planning  Goal: Discharge to home or other facility with appropriate resources  8/15/2024 2309 by Angelica Russell RN  Outcome: Progressing  Flowsheets (Taken 8/15/2024 2000)  Discharge to home or other facility with appropriate resources: Identify barriers to discharge with patient and caregiver  8/15/2024 1351 by Bella Gibbs RN  Outcome: Progressing  Flowsheets (Taken 8/15/2024 0800)  Discharge to home or other facility with appropriate resources: Identify barriers to discharge with patient and caregiver     Problem: Safety - Medical Restraint  Goal: Remains free of injury from restraints (Restraint for Interference with Medical Device)  Description: INTERVENTIONS:  1. Determine that other, less restrictive measures have been tried or would not be effective before applying the restraint  2. Evaluate the patient's condition at the time of restraint application  3. Inform patient/family regarding the reason for restraint  4. Q2H: Monitor safety, psychosocial status, comfort, nutrition and hydration  8/15/2024 2309 by Angelica Russell RN  Outcome: Progressing  Flowsheets  Taken 8/15/2024 2200 by Angelica Russell RN  Remains free of injury from restraints (restraint for interference with medical device):   Determine that other, less restrictive measures have been tried or would not be effective before applying the restraint   Evaluate the patient's condition at the time of restraint application   Inform patient/family regarding the reason for restraint   Every 2 hours: Monitor safety, psychosocial status, comfort, nutrition and hydration  Taken 8/15/2024 2000 by Angelica Russlel RN  Remains free of injury from restraints (restraint for interference with medical device):   Determine that other, less restrictive measures have been tried or would not be effective before applying the restraint   Evaluate the patient's condition at the time of restraint application   Inform

## 2024-08-16 NOTE — BRIEF OP NOTE
Brief Postoperative Note Lumbar Puncture    Aiden Black  YOB: 1950  [id number]    Pre-operative Diagnosis: Concern for CNS infection     Post-operative Diagnosis: Same    Procedure: Fluoro guided Lumbar Puncture    Anesthesia: 1% Lidocaine    Surgeons/Assistants: KASSIDY RAZA MD    Complications: None    Specimens: Obtained and sent to lab    Fluoro guided lumbar puncture. 20 gauge spinal needle. L3-L4. 8 ml yellow tinted, sl cloudy CSF obtained.  Dressing applied.  Vital signs were reviewed and were stable after the procedure.  Discharged to floor.    Electronically signed by KASSIDY RAZA MD on 8/16/2024 at 8:47 AM

## 2024-08-17 ENCOUNTER — APPOINTMENT (OUTPATIENT)
Dept: GENERAL RADIOLOGY | Age: 74
End: 2024-08-17
Payer: MEDICARE

## 2024-08-17 PROBLEM — R83.8 ELEVATED CSF PROTEIN: Status: ACTIVE | Noted: 2024-08-17

## 2024-08-17 LAB
ALBUMIN CSF-MCNC: 932 MG/L (ref 70–350)
ALBUMIN INDEX: 321.4
ALBUMIN SERPL-MCNC: 2.7 G/DL (ref 3.5–5.2)
ALBUMIN SERPL-MCNC: 2.9 G/DL (ref 3.5–5.2)
ALP SERPL-CCNC: 116 U/L (ref 40–129)
ALT SERPL-CCNC: 115 U/L (ref 5–41)
AMMONIA PLAS-SCNC: 119 UMOL/L (ref 16–60)
ANION GAP SERPL CALCULATED.3IONS-SCNC: 8 MMOL/L (ref 9–17)
AST SERPL-CCNC: 70 U/L
BASOPHILS # BLD: 0 K/UL (ref 0–0.2)
BASOPHILS NFR BLD: 0 % (ref 0–2)
BILIRUB SERPL-MCNC: 1.6 MG/DL (ref 0.3–1.2)
BUN SERPL-MCNC: 48 MG/DL (ref 8–23)
BUN/CREAT SERPL: 44 (ref 9–20)
CALCIUM SERPL-MCNC: 8.7 MG/DL (ref 8.6–10.4)
CHLORIDE SERPL-SCNC: 110 MMOL/L (ref 98–107)
CO2 SERPL-SCNC: 25 MMOL/L (ref 20–31)
CREAT SERPL-MCNC: 1.1 MG/DL (ref 0.7–1.2)
CRYPTOC AG CSF QL: NEGATIVE
EOSINOPHIL # BLD: 0 K/UL (ref 0–0.44)
EOSINOPHILS RELATIVE PERCENT: 0 % (ref 1–4)
ERYTHROCYTE [DISTWIDTH] IN BLOOD BY AUTOMATED COUNT: 14.5 % (ref 11.8–14.4)
GFR, ESTIMATED: 70 ML/MIN/1.73M2
GLUCOSE BLD-MCNC: 147 MG/DL (ref 75–110)
GLUCOSE BLD-MCNC: 148 MG/DL (ref 75–110)
GLUCOSE BLD-MCNC: 165 MG/DL (ref 75–110)
GLUCOSE BLD-MCNC: 166 MG/DL (ref 75–110)
GLUCOSE BLD-MCNC: 173 MG/DL (ref 75–110)
GLUCOSE SERPL-MCNC: 180 MG/DL (ref 70–99)
HCT VFR BLD AUTO: 35.4 % (ref 40.7–50.3)
HGB BLD-MCNC: 11.7 G/DL (ref 13–17)
IGG CSF-MCNC: 18 MG/DL (ref 1–3)
IGG INDEX CSF: 0.62
IGG SERPL-MCNC: 900 MG/DL (ref 700–1600)
IGG SYNTHESIS RATE CSF: 24 MG/24 H
IMM GRANULOCYTES # BLD AUTO: 0.06 K/UL (ref 0–0.3)
IMM GRANULOCYTES NFR BLD: 1 %
LYMPHOCYTES NFR BLD: 0.41 K/UL (ref 1.1–3.7)
LYMPHOCYTES RELATIVE PERCENT: 7 % (ref 24–43)
MCH RBC QN AUTO: 35.3 PG (ref 25.2–33.5)
MCHC RBC AUTO-ENTMCNC: 33.1 G/DL (ref 28.4–34.8)
MCV RBC AUTO: 106.9 FL (ref 82.6–102.9)
MONOCYTES NFR BLD: 0.46 K/UL (ref 0.1–1.2)
MONOCYTES NFR BLD: 8 % (ref 3–12)
NEUTROPHILS NFR BLD: 84 % (ref 36–65)
NEUTS SEG NFR BLD: 4.87 K/UL (ref 1.5–8.1)
NRBC BLD-RTO: 0 PER 100 WBC
OLIGOCLONAL BANDS: ABNORMAL
PLATELET # BLD AUTO: ABNORMAL K/UL (ref 138–453)
PLATELET, FLUORESCENCE: 49 K/UL (ref 138–453)
PLATELETS.RETICULATED NFR BLD AUTO: 3.9 % (ref 1.1–10.3)
POTASSIUM SERPL-SCNC: 5 MMOL/L (ref 3.7–5.3)
PROT SERPL-MCNC: 4.8 G/DL (ref 6.4–8.3)
RBC # BLD AUTO: 3.31 M/UL (ref 4.21–5.77)
SODIUM SERPL-SCNC: 143 MMOL/L (ref 135–144)
WBC OTHER # BLD: 5.8 K/UL (ref 3.5–11.3)

## 2024-08-17 PROCEDURE — 2700000000 HC OXYGEN THERAPY PER DAY

## 2024-08-17 PROCEDURE — 99233 SBSQ HOSP IP/OBS HIGH 50: CPT | Performed by: PSYCHIATRY & NEUROLOGY

## 2024-08-17 PROCEDURE — 6370000000 HC RX 637 (ALT 250 FOR IP): Performed by: FAMILY MEDICINE

## 2024-08-17 PROCEDURE — 2580000003 HC RX 258: Performed by: NURSE PRACTITIONER

## 2024-08-17 PROCEDURE — 6360000002 HC RX W HCPCS: Performed by: INTERNAL MEDICINE

## 2024-08-17 PROCEDURE — 82947 ASSAY GLUCOSE BLOOD QUANT: CPT

## 2024-08-17 PROCEDURE — 71045 X-RAY EXAM CHEST 1 VIEW: CPT

## 2024-08-17 PROCEDURE — 6370000000 HC RX 637 (ALT 250 FOR IP): Performed by: INTERNAL MEDICINE

## 2024-08-17 PROCEDURE — 85055 RETICULATED PLATELET ASSAY: CPT

## 2024-08-17 PROCEDURE — 94761 N-INVAS EAR/PLS OXIMETRY MLT: CPT

## 2024-08-17 PROCEDURE — 94003 VENT MGMT INPAT SUBQ DAY: CPT

## 2024-08-17 PROCEDURE — 2580000003 HC RX 258: Performed by: INTERNAL MEDICINE

## 2024-08-17 PROCEDURE — 80053 COMPREHEN METABOLIC PANEL: CPT

## 2024-08-17 PROCEDURE — 94640 AIRWAY INHALATION TREATMENT: CPT

## 2024-08-17 PROCEDURE — 82140 ASSAY OF AMMONIA: CPT

## 2024-08-17 PROCEDURE — 36415 COLL VENOUS BLD VENIPUNCTURE: CPT

## 2024-08-17 PROCEDURE — 85025 COMPLETE CBC W/AUTO DIFF WBC: CPT

## 2024-08-17 PROCEDURE — 2000000000 HC ICU R&B

## 2024-08-17 RX ADMIN — SODIUM CHLORIDE, PRESERVATIVE FREE 10 ML: 5 INJECTION INTRAVENOUS at 20:36

## 2024-08-17 RX ADMIN — LACTULOSE 30 G: 20 SOLUTION ORAL at 05:34

## 2024-08-17 RX ADMIN — BUDESONIDE 500 MCG: 0.5 SUSPENSION RESPIRATORY (INHALATION) at 19:02

## 2024-08-17 RX ADMIN — CETIRIZINE HYDROCHLORIDE 10 MG: 10 TABLET, FILM COATED ORAL at 09:42

## 2024-08-17 RX ADMIN — LACTULOSE 30 G: 20 SOLUTION ORAL at 00:04

## 2024-08-17 RX ADMIN — RIFAXIMIN 550 MG: 550 TABLET ORAL at 09:42

## 2024-08-17 RX ADMIN — ARFORMOTEROL TARTRATE 15 MCG: 15 SOLUTION RESPIRATORY (INHALATION) at 07:23

## 2024-08-17 RX ADMIN — WATER 40 MG: 1 INJECTION INTRAMUSCULAR; INTRAVENOUS; SUBCUTANEOUS at 13:13

## 2024-08-17 RX ADMIN — IPRATROPIUM BROMIDE AND ALBUTEROL SULFATE 1 DOSE: 2.5; .5 SOLUTION RESPIRATORY (INHALATION) at 10:57

## 2024-08-17 RX ADMIN — SODIUM CHLORIDE, PRESERVATIVE FREE 40 MG: 5 INJECTION INTRAVENOUS at 20:36

## 2024-08-17 RX ADMIN — WATER 40 MG: 1 INJECTION INTRAMUSCULAR; INTRAVENOUS; SUBCUTANEOUS at 04:19

## 2024-08-17 RX ADMIN — BUDESONIDE 500 MCG: 0.5 SUSPENSION RESPIRATORY (INHALATION) at 07:23

## 2024-08-17 RX ADMIN — IPRATROPIUM BROMIDE AND ALBUTEROL SULFATE 1 DOSE: 2.5; .5 SOLUTION RESPIRATORY (INHALATION) at 07:23

## 2024-08-17 RX ADMIN — ARFORMOTEROL TARTRATE 15 MCG: 15 SOLUTION RESPIRATORY (INHALATION) at 19:02

## 2024-08-17 RX ADMIN — IPRATROPIUM BROMIDE AND ALBUTEROL SULFATE 1 DOSE: 2.5; .5 SOLUTION RESPIRATORY (INHALATION) at 15:03

## 2024-08-17 RX ADMIN — SODIUM CHLORIDE, PRESERVATIVE FREE 10 ML: 5 INJECTION INTRAVENOUS at 09:42

## 2024-08-17 RX ADMIN — MIDODRINE HYDROCHLORIDE 5 MG: 5 TABLET ORAL at 13:13

## 2024-08-17 RX ADMIN — LACTULOSE 30 G: 20 SOLUTION ORAL at 18:38

## 2024-08-17 RX ADMIN — MIDODRINE HYDROCHLORIDE 5 MG: 5 TABLET ORAL at 18:38

## 2024-08-17 RX ADMIN — WATER 40 MG: 1 INJECTION INTRAMUSCULAR; INTRAVENOUS; SUBCUTANEOUS at 18:38

## 2024-08-17 RX ADMIN — IPRATROPIUM BROMIDE AND ALBUTEROL SULFATE 1 DOSE: 2.5; .5 SOLUTION RESPIRATORY (INHALATION) at 19:02

## 2024-08-17 RX ADMIN — RIFAXIMIN 550 MG: 550 TABLET ORAL at 20:36

## 2024-08-17 RX ADMIN — SODIUM CHLORIDE, PRESERVATIVE FREE 40 MG: 5 INJECTION INTRAVENOUS at 09:42

## 2024-08-17 RX ADMIN — LACTULOSE 30 G: 20 SOLUTION ORAL at 13:15

## 2024-08-17 ASSESSMENT — PULMONARY FUNCTION TESTS
PIF_VALUE: 18
PIF_VALUE: 0
PIF_VALUE: 32
PIF_VALUE: 18
PIF_VALUE: 18
PIF_VALUE: 30
PIF_VALUE: 24
PIF_VALUE: 24

## 2024-08-17 ASSESSMENT — PAIN SCALES - GENERAL: PAINLEVEL_OUTOF10: 0

## 2024-08-17 NOTE — PLAN OF CARE
Problem: Respiratory - Adult  Goal: Achieves optimal ventilation and oxygenation  Outcome: Progressing     Problem: Respiratory - Adult  Goal: Able to breathe comfortably  Outcome: Progressing     Problem: Respiratory - Adult  Goal: Patient's breath sounds will be clear and equal  Outcome: Progressing     Problem: Respiratory - Adult  Goal: Adequate oxygenation  Description: Adequate oxygenation  Outcome: Progressing     Problem: Respiratory - Adult  Goal: Will be able to breathe spontaneously, without ventilator support  Description: Will be able to breathe spontaneously, without ventilator support  Outcome: Progressing

## 2024-08-17 NOTE — PLAN OF CARE
Problem: Discharge Planning  Goal: Discharge to home or other facility with appropriate resources  Outcome: Progressing  Flowsheets (Taken 8/17/2024 0800)  Discharge to home or other facility with appropriate resources: Identify barriers to discharge with patient and caregiver     Problem: Safety - Medical Restraint  Goal: Remains free of injury from restraints (Restraint for Interference with Medical Device)  Description: INTERVENTIONS:  1. Determine that other, less restrictive measures have been tried or would not be effective before applying the restraint  2. Evaluate the patient's condition at the time of restraint application  3. Inform patient/family regarding the reason for restraint  4. Q2H: Monitor safety, psychosocial status, comfort, nutrition and hydration  Outcome: Progressing  Flowsheets  Taken 8/17/2024 1811  Remains free of injury from restraints (restraint for interference with medical device): Determine that other, less restrictive measures have been tried or would not be effective before applying the restraint  Taken 8/17/2024 1800  Remains free of injury from restraints (restraint for interference with medical device): Determine that other, less restrictive measures have been tried or would not be effective before applying the restraint  Taken 8/17/2024 1600  Remains free of injury from restraints (restraint for interference with medical device): Determine that other, less restrictive measures have been tried or would not be effective before applying the restraint  Taken 8/17/2024 1400  Remains free of injury from restraints (restraint for interference with medical device): Determine that other, less restrictive measures have been tried or would not be effective before applying the restraint  Taken 8/17/2024 1200  Remains free of injury from restraints (restraint for interference with medical device): Determine that other, less restrictive measures have been tried or would not be effective  before applying the restraint  Taken 8/17/2024 1000  Remains free of injury from restraints (restraint for interference with medical device): Determine that other, less restrictive measures have been tried or would not be effective before applying the restraint  Taken 8/17/2024 0800  Remains free of injury from restraints (restraint for interference with medical device): Determine that other, less restrictive measures have been tried or would not be effective before applying the restraint     Problem: Safety - Adult  Goal: Free from fall injury  Outcome: Progressing  Flowsheets (Taken 8/17/2024 1700)  Free From Fall Injury: Instruct family/caregiver on patient safety     Problem: Respiratory - Adult  Goal: Achieves optimal ventilation and oxygenation  8/17/2024 1938 by Angelica Hodgson, RN  Outcome: Progressing  8/17/2024 1913 by Ana Lilia Serrato RCP  Outcome: Progressing  Flowsheets (Taken 8/17/2024 0800 by Angelica Hodgson, RN)  Achieves optimal ventilation and oxygenation: Assess for changes in respiratory status  Goal: Able to breathe comfortably  8/17/2024 1913 by Ana Lilia Serrato RCP  Outcome: Progressing  Goal: Patient's breath sounds will be clear and equal  8/17/2024 1913 by Ana Lilia Serrato RCP  Outcome: Progressing  Goal: Adequate oxygenation  Description: Adequate oxygenation  8/17/2024 1913 by Ana Lilia Serrato RCP  Outcome: Progressing  Goal: Will be able to breathe spontaneously, without ventilator support  Description: Will be able to breathe spontaneously, without ventilator support  8/17/2024 1913 by Ana Lilia Serrato RCP  Outcome: Progressing     Problem: ABCDS Injury Assessment  Goal: Absence of physical injury  Outcome: Progressing  Flowsheets (Taken 8/17/2024 1700)  Absence of Physical Injury: Implement safety measures based on patient assessment     Problem: Pain  Goal: Verbalizes/displays adequate comfort level or baseline comfort level  Outcome: Progressing  Flowsheets  Taken 8/17/2024

## 2024-08-17 NOTE — PROGRESS NOTES
End Of Shift Note  [facility]  Summary of shift: Uneventful shift; pt follows commands with nodding and gestures but unable to squeeze hands/wiggle toes, responsive to pain in extremities; pt alert, denies discomfort/pain; 1 loose lg light brown BM, passing flatus; wife and son at bedside most of shift; Dr. Toney doesn't want to extubate today d/t elevated ammonia level; no residuals, tolerating enteral nutrition well; urinary output increases this shift; Q2 hr turn    Vitals:    Vitals:    08/17/24 1900 08/17/24 1902 08/17/24 1915 08/17/24 1930   BP: 128/63   111/74   Pulse: 72 74 72 70   Resp: 28 25 11 (!) 7   Temp:       TempSrc:       SpO2: 97% 98% 98% 97%   Weight:       Height:            I&O:   Intake/Output Summary (Last 24 hours) at 8/17/2024 1942  Last data filed at 8/17/2024 1740  Gross per 24 hour   Intake 2753 ml   Output 1025 ml   Net 1728 ml       Resp Status: Clear/diminished t/o; no coughing, tolerating vent.    Ventilator Settings:  Vent Mode: AC/PRVC Resp Rate (Set): 14 bpm/Vt (Set, mL): 550 mL/ /FiO2 : 40 %    Critical Care IV infusions:   [Held by provider] sodium chloride Stopped (08/13/24 1139)    dextrose      sodium chloride 10 mL/hr at 08/11/24 0628    midazolam Stopped (08/16/24 0700)    norepinephrine Stopped (08/16/24 0700)        LDA:   PICC 08/10/24 Left Brachial (Active)   Number of days: 7       Peripheral IV 08/10/24 Distal;Left;Anterior Forearm (Active)   Number of days: 7       NG/OG/NJ/NE Tube Orogastric Center mouth (Active)   Number of days: 7       Urinary Catheter 08/10/24 Roca (Active)   Number of days: 7       ETT  (Active)   Number of days: 7

## 2024-08-17 NOTE — PROGRESS NOTES
End Of Shift Note  [facility]  Summary of shift: Pt had uneventful shift. Pt unable to follow commands but will nod/gesture head appropriately. Restraints remain applied d/t pt reaching for ETT. Pt is compliant with ventilator although tachypneic with stimulation.  No insulin coverage needed this shift. TF cont at goal with little residuals. Recheck K 6hr after lokelma dose- 5.1. Lactulose given x2 throughout shift, no BM. Ammonia 119 this morning. No AM lab replacements needed. Q2 turns, bath given. MERCY removed d/t infiltration. BP soft throughout shift but MAP >65 and SBP>90. No sedation given. CXR given. Pt's son, Richi, at bedside throughout night, all concerns/questions addressed. 275mL urine output measured.     Vitals:    Vitals:    08/17/24 0500 08/17/24 0515 08/17/24 0530 08/17/24 0545   BP: (!) 96/56  (!) 99/55    Pulse: 58 63 61 70   Resp: 16 17 24 18   Temp:       TempSrc:       SpO2: 98% 99% 98% 98%   Weight:       Height:            I&O:   Intake/Output Summary (Last 24 hours) at 8/17/2024 0603  Last data filed at 8/17/2024 0400  Gross per 24 hour   Intake 1320 ml   Output 1433 ml   Net -113 ml       Resp Status: Vent settings below    Ventilator Settings:  Vent Mode: AC/PRVC Resp Rate (Set): 14 bpm/Vt (Set, mL): 550 mL/ /FiO2 : 40 %    Critical Care IV infusions:   [Held by provider] sodium chloride Stopped (08/13/24 1139)    dextrose      sodium chloride 10 mL/hr at 08/11/24 0628    midazolam Stopped (08/16/24 0700)    norepinephrine Stopped (08/16/24 0700)        LDA:   PICC 08/10/24 Left Brachial (Active)   Number of days: 6       Peripheral IV 08/10/24 Distal;Left;Anterior Forearm (Active)   Number of days: 7       NG/OG/NJ/NE Tube Orogastric Center mouth (Active)   Number of days: 6       Urinary Catheter 08/10/24 Roca (Active)   Number of days: 7       ETT  (Active)   Number of days: 7

## 2024-08-17 NOTE — PLAN OF CARE
Problem: Discharge Planning  Goal: Discharge to home or other facility with appropriate resources  8/16/2024 2045 by Leila Rooney RN  Outcome: Progressing  8/16/2024 1723 by Teresa Marroquin RN  Outcome: Progressing  Flowsheets (Taken 8/16/2024 0800)  Discharge to home or other facility with appropriate resources:   Identify barriers to discharge with patient and caregiver   Arrange for needed discharge resources and transportation as appropriate   Identify discharge learning needs (meds, wound care, etc)   Refer to discharge planning if patient needs post-hospital services based on physician order or complex needs related to functional status, cognitive ability or social support system     Problem: Safety - Medical Restraint  Goal: Remains free of injury from restraints (Restraint for Interference with Medical Device)  Description: INTERVENTIONS:  1. Determine that other, less restrictive measures have been tried or would not be effective before applying the restraint  2. Evaluate the patient's condition at the time of restraint application  3. Inform patient/family regarding the reason for restraint  4. Q2H: Monitor safety, psychosocial status, comfort, nutrition and hydration  8/16/2024 2045 by Leila Rooney RN  Outcome: Progressing  Flowsheets  Taken 8/16/2024 2000 by Leila Rooney RN  Remains free of injury from restraints (restraint for interference with medical device):   Determine that other, less restrictive measures have been tried or would not be effective before applying the restraint   Evaluate the patient's condition at the time of restraint application   Inform patient/family regarding the reason for restraint   Every 2 hours: Monitor safety, psychosocial status, comfort, nutrition and hydration  Taken 8/16/2024 1807 by Teresa Marroquin RN  Remains free of injury from restraints (restraint for interference with medical device): Determine that other, less restrictive measures have been tried or

## 2024-08-17 NOTE — PROGRESS NOTES
Nutrition Assessment     Type and Reason for Visit: Reassess    Nutrition Recommendations/Plan:   NPO diet  Continue Vital AF 1.2 Itz goal rate 65 mL/hr (1560 mL total volume). Water flush 30 mL every 4 hours  Monitor tube feeding rate, tolerance, GI function, vent status and labs     Malnutrition Assessment:  Malnutrition Status: At risk for malnutrition (Comment)    Nutrition Assessment:  Patient remains intubated and sedated on the vent. Patient is tolerating tube feeding Vital AF 1.2 Itz at goal rate 65 mL/hr (1560 mL total volume). Patient is receiving Lokelma and Lactulose for elevated potassium and ammonia. Patient is status post lumbar puncture done today (8/16). Continue tube feedings at goal rate. Will monitor vent status, tube feeding rate, tolerance, GI function and labs.    Estimated Daily Nutrient Needs:  Energy (kcal):  3279-6834 kcal (20-25 kcal/kg) Weight Used for Energy Requirements: Current     Protein (g):  101-117 gm of protein (1.3-1.5 gm/kg) Weight Used for Protein Requirements: Ideal        Fluid (ml/day):  0160-6343 mL Method Used for Fluid Requirements: 1 ml/kcal    Nutrition Related Findings:   Edema: +2 pitting RUE, +1 LUE, +1 BLE. Active bowel sounds. Diarrhea- on lactulose. Labs: NH3: 127 (H), K+: 5.4 (H); AST/ALT: 56/88 (H) Wound Type: None    Current Nutrition Therapies:    Diet NPO  ADULT TUBE FEEDING; Orogastric; Peptide Based; Continuous; 15; Yes; 15; Q 4 hours; 65; 30; Q 4 hours    Anthropometric Measures:  Height: 180.3 cm (5' 11\")  Current Body Wt: 92 kg (202 lb 13.2 oz)   BMI: 28.3    Nutrition Diagnosis:   Inadequate oral intake related to inadequate protein-energy intake, impaired respiratory function as evidenced by NPO or clear liquid status due to medical condition, intubation, nutrition support - enteral nutrition    Nutrition Interventions:   Food and/or Nutrient Delivery: Continue NPO, Continue Current Tube Feeding  Nutrition Education/Counseling: Education not  indicated  Coordination of Nutrition Care: Continue to monitor while inpatient       Goals:  Previous Goal Met: Progressing toward Goal(s)  Goals: Meet at least 75% of estimated needs, Tolerate nutrition support at goal rate       Nutrition Monitoring and Evaluation:      Food/Nutrient Intake Outcomes: Diet Advancement/Tolerance, Enteral Nutrition Intake/Tolerance  Physical Signs/Symptoms Outcomes: Biochemical Data, Fluid Status or Edema, Skin, Weight, GI Status    Discharge Planning:    Too soon to determine       Iman ALMEIDAN, RDN, LDN  Lead Clinical Dietitian  RD Office Phone [phone]

## 2024-08-17 NOTE — PROGRESS NOTES
Pulmonary Critical Care Progress Note    Patient seen for the follow up of COPD exacerbation (HCC) , resp failure    Subjective:    Currently on wean peep 8 / PS 10, following commands. Ammonia remains >100, will recheck in am and leave on CPAP as tolerated    Examination:    Vitals: BP (!) 147/75   Pulse 89   Temp 98 °F (36.7 °C) (Oral)   Resp 29   Ht 1.803 m (5' 11\")   Wt 92.1 kg (203 lb)   SpO2 99%   BMI 28.31 kg/m²   SpO2  Av.6 %  Min: 96 %  Max: 100 %  General appearance: Intubated, on SBT, following commands  Neck: No JVD  Lungs: Decreased breath sound no crackles or wheeze  Heart: irregular rate and rhythm, S1, S2 normal, no gallop  Abdomen: Soft, non tender, + BS  Extremities: no cyanosis or clubbing. Bilat edema of hands    LABs:    CBC:   Recent Labs     24  0412 24  0346   WBC 6.0 5.8   HGB 11.4* 11.7*   HCT 34.3* 35.4*   PLT 53* See Reflexed IPF Result     BMP:   Recent Labs     24  2043 24  0346    143   K 5.1 5.0   CO2 26 25   BUN 48* 48*   CREATININE 1.1 1.1   LABGLOM 70 70   GLUCOSE 157* 180*         LIVER PROFILE:  Recent Labs     24  0412 24  0346   AST 56* 70*   ALT 88* 115*      Latest Reference Range & Units 24 04:29   POC HCO3 21.0 - 28.0 mmol/L 21.7   POC O2 SAT 94.0 - 98.0 % 98.2 (H)   POC pCO2 35.0 - 48.0 mm Hg 36.7   POC pH 7.350 - 7.450  7.379   POC PO2 83.0 - 108.0 mm Hg 110.0 (H)   (H): Data is abnormally high     Latest Reference Range & Units 24 12:55 24 03:43 08/15/24 09:00 24 04:12   Ammonia 16 - 60 umol/L 62 (H) 58 92 (H) 127 (HH)   (HH): Data is critically high  (H): Data is abnormally high    Radiology:    Chest x-ray   Reviewed  1. No acute cardiopulmonary process.  2. Support devices are stable in position.        Impression/recommendations;    Acute hypoxic and hypercarbic respiratory failure requiring intubation  On CPAP, Keep on CPAP as tolerated - possible extubation 24     Monitor off

## 2024-08-18 ENCOUNTER — APPOINTMENT (OUTPATIENT)
Dept: GENERAL RADIOLOGY | Age: 74
End: 2024-08-18
Payer: MEDICARE

## 2024-08-18 LAB
ALBUMIN SERPL-MCNC: 2.9 G/DL (ref 3.5–5.2)
ALP SERPL-CCNC: 139 U/L (ref 40–129)
ALT SERPL-CCNC: 133 U/L (ref 5–41)
AMMONIA PLAS-SCNC: 118 UMOL/L (ref 16–60)
ANION GAP SERPL CALCULATED.3IONS-SCNC: 7 MMOL/L (ref 9–17)
AST SERPL-CCNC: 74 U/L
BASOPHILS # BLD: 0 K/UL (ref 0–0.2)
BASOPHILS NFR BLD: 0 %
BILIRUB SERPL-MCNC: 1.9 MG/DL (ref 0.3–1.2)
BUN SERPL-MCNC: 52 MG/DL (ref 8–23)
BUN/CREAT SERPL: 52 (ref 9–20)
CALCIUM SERPL-MCNC: 8.9 MG/DL (ref 8.6–10.4)
CHLORIDE SERPL-SCNC: 110 MMOL/L (ref 98–107)
CO2 SERPL-SCNC: 26 MMOL/L (ref 20–31)
CREAT SERPL-MCNC: 1 MG/DL (ref 0.7–1.2)
EOSINOPHIL # BLD: 0 K/UL (ref 0–0.4)
EOSINOPHILS RELATIVE PERCENT: 0 % (ref 1–4)
ERYTHROCYTE [DISTWIDTH] IN BLOOD BY AUTOMATED COUNT: 14.6 % (ref 11.8–14.4)
FIO2: 40
GFR, ESTIMATED: 78 ML/MIN/1.73M2
GLUCOSE BLD-MCNC: 177 MG/DL (ref 75–110)
GLUCOSE BLD-MCNC: 193 MG/DL (ref 75–110)
GLUCOSE BLD-MCNC: 199 MG/DL (ref 75–110)
GLUCOSE BLD-MCNC: 205 MG/DL (ref 75–110)
GLUCOSE SERPL-MCNC: 174 MG/DL (ref 70–99)
HCT VFR BLD AUTO: 39.2 % (ref 40.7–50.3)
HGB BLD-MCNC: 12.9 G/DL (ref 13–17)
IMM GRANULOCYTES # BLD AUTO: 0.17 K/UL (ref 0–0.3)
IMM GRANULOCYTES NFR BLD: 2 %
LYMPHOCYTES NFR BLD: 0.69 K/UL (ref 1–4.8)
LYMPHOCYTES RELATIVE PERCENT: 8 % (ref 24–44)
MCH RBC QN AUTO: 35.4 PG (ref 25.2–33.5)
MCHC RBC AUTO-ENTMCNC: 32.9 G/DL (ref 28.4–34.8)
MCV RBC AUTO: 107.7 FL (ref 82.6–102.9)
MONOCYTES NFR BLD: 0.77 K/UL (ref 0.2–0.8)
MONOCYTES NFR BLD: 9 % (ref 1–7)
NEUTROPHILS NFR BLD: 81 % (ref 36–66)
NEUTS SEG NFR BLD: 6.97 K/UL (ref 1.8–7.7)
NRBC BLD-RTO: 0.2 PER 100 WBC
PATIENT TEMP: 37
PLATELET # BLD AUTO: 50 K/UL (ref 138–453)
PMV BLD AUTO: 11.1 FL (ref 8.1–13.5)
POC HCO3: 29.4 MMOL/L (ref 21–28)
POC O2 SATURATION: 94 % (ref 94–98)
POC PCO2: 52.7 MM HG (ref 35–48)
POC PH: 7.36 (ref 7.35–7.45)
POC PO2: 75.5 MM HG (ref 83–108)
POSITIVE BASE EXCESS, ART: 3 MMOL/L (ref 0–3)
POTASSIUM SERPL-SCNC: 5.6 MMOL/L (ref 3.7–5.3)
PROT SERPL-MCNC: 5.3 G/DL (ref 6.4–8.3)
RBC # BLD AUTO: 3.64 M/UL (ref 4.21–5.77)
SODIUM SERPL-SCNC: 143 MMOL/L (ref 135–144)
WBC OTHER # BLD: 8.6 K/UL (ref 3.5–11.3)

## 2024-08-18 PROCEDURE — 94640 AIRWAY INHALATION TREATMENT: CPT

## 2024-08-18 PROCEDURE — 80053 COMPREHEN METABOLIC PANEL: CPT

## 2024-08-18 PROCEDURE — 36415 COLL VENOUS BLD VENIPUNCTURE: CPT

## 2024-08-18 PROCEDURE — 94003 VENT MGMT INPAT SUBQ DAY: CPT

## 2024-08-18 PROCEDURE — 6370000000 HC RX 637 (ALT 250 FOR IP): Performed by: INTERNAL MEDICINE

## 2024-08-18 PROCEDURE — 36600 WITHDRAWAL OF ARTERIAL BLOOD: CPT

## 2024-08-18 PROCEDURE — 2580000003 HC RX 258: Performed by: INTERNAL MEDICINE

## 2024-08-18 PROCEDURE — 85025 COMPLETE CBC W/AUTO DIFF WBC: CPT

## 2024-08-18 PROCEDURE — 2500000003 HC RX 250 WO HCPCS: Performed by: INTERNAL MEDICINE

## 2024-08-18 PROCEDURE — 6370000000 HC RX 637 (ALT 250 FOR IP): Performed by: FAMILY MEDICINE

## 2024-08-18 PROCEDURE — 6360000002 HC RX W HCPCS: Performed by: INTERNAL MEDICINE

## 2024-08-18 PROCEDURE — 82140 ASSAY OF AMMONIA: CPT

## 2024-08-18 PROCEDURE — 6370000000 HC RX 637 (ALT 250 FOR IP): Performed by: NURSE PRACTITIONER

## 2024-08-18 PROCEDURE — 2000000000 HC ICU R&B

## 2024-08-18 PROCEDURE — 99232 SBSQ HOSP IP/OBS MODERATE 35: CPT | Performed by: PSYCHIATRY & NEUROLOGY

## 2024-08-18 PROCEDURE — 2700000000 HC OXYGEN THERAPY PER DAY

## 2024-08-18 PROCEDURE — 82947 ASSAY GLUCOSE BLOOD QUANT: CPT

## 2024-08-18 PROCEDURE — 71045 X-RAY EXAM CHEST 1 VIEW: CPT

## 2024-08-18 PROCEDURE — 82803 BLOOD GASES ANY COMBINATION: CPT

## 2024-08-18 PROCEDURE — 94761 N-INVAS EAR/PLS OXIMETRY MLT: CPT

## 2024-08-18 PROCEDURE — 2580000003 HC RX 258: Performed by: NURSE PRACTITIONER

## 2024-08-18 RX ORDER — LORAZEPAM 2 MG/ML
1 INJECTION INTRAMUSCULAR EVERY 4 HOURS PRN
Status: DISCONTINUED | OUTPATIENT
Start: 2024-08-18 | End: 2024-08-22 | Stop reason: HOSPADM

## 2024-08-18 RX ORDER — MIDAZOLAM HYDROCHLORIDE 1 MG/ML
1 INJECTION INTRAMUSCULAR; INTRAVENOUS ONCE
Status: COMPLETED | OUTPATIENT
Start: 2024-08-18 | End: 2024-08-18

## 2024-08-18 RX ADMIN — LACTULOSE 30 G: 20 SOLUTION ORAL at 12:06

## 2024-08-18 RX ADMIN — MIDODRINE HYDROCHLORIDE 5 MG: 5 TABLET ORAL at 08:34

## 2024-08-18 RX ADMIN — LACTULOSE 30 G: 20 SOLUTION ORAL at 00:10

## 2024-08-18 RX ADMIN — WATER 40 MG: 1 INJECTION INTRAMUSCULAR; INTRAVENOUS; SUBCUTANEOUS at 17:51

## 2024-08-18 RX ADMIN — CETIRIZINE HYDROCHLORIDE 10 MG: 10 TABLET, FILM COATED ORAL at 08:34

## 2024-08-18 RX ADMIN — RIFAXIMIN 400 MG: 200 TABLET ORAL at 15:55

## 2024-08-18 RX ADMIN — LACTULOSE 30 G: 20 SOLUTION ORAL at 06:04

## 2024-08-18 RX ADMIN — IPRATROPIUM BROMIDE AND ALBUTEROL SULFATE 1 DOSE: 2.5; .5 SOLUTION RESPIRATORY (INHALATION) at 11:00

## 2024-08-18 RX ADMIN — DEXMEDETOMIDINE 0.2 MCG/KG/HR: 100 INJECTION, SOLUTION INTRAVENOUS at 17:00

## 2024-08-18 RX ADMIN — RIFAXIMIN 550 MG: 550 TABLET ORAL at 08:34

## 2024-08-18 RX ADMIN — ARFORMOTEROL TARTRATE 15 MCG: 15 SOLUTION RESPIRATORY (INHALATION) at 18:58

## 2024-08-18 RX ADMIN — DEXMEDETOMIDINE 0.2 MCG/KG/HR: 100 INJECTION, SOLUTION INTRAVENOUS at 03:45

## 2024-08-18 RX ADMIN — BUDESONIDE 500 MCG: 0.5 SUSPENSION RESPIRATORY (INHALATION) at 18:58

## 2024-08-18 RX ADMIN — SODIUM CHLORIDE, PRESERVATIVE FREE 40 MG: 5 INJECTION INTRAVENOUS at 08:34

## 2024-08-18 RX ADMIN — IPRATROPIUM BROMIDE AND ALBUTEROL SULFATE 1 DOSE: 2.5; .5 SOLUTION RESPIRATORY (INHALATION) at 07:10

## 2024-08-18 RX ADMIN — ARFORMOTEROL TARTRATE 15 MCG: 15 SOLUTION RESPIRATORY (INHALATION) at 07:10

## 2024-08-18 RX ADMIN — MIDODRINE HYDROCHLORIDE 5 MG: 5 TABLET ORAL at 12:06

## 2024-08-18 RX ADMIN — RIFAXIMIN 400 MG: 200 TABLET ORAL at 20:04

## 2024-08-18 RX ADMIN — IPRATROPIUM BROMIDE AND ALBUTEROL SULFATE 1 DOSE: 2.5; .5 SOLUTION RESPIRATORY (INHALATION) at 14:22

## 2024-08-18 RX ADMIN — MIDAZOLAM 1 MG: 1 INJECTION INTRAMUSCULAR; INTRAVENOUS at 04:36

## 2024-08-18 RX ADMIN — WATER 40 MG: 1 INJECTION INTRAMUSCULAR; INTRAVENOUS; SUBCUTANEOUS at 12:06

## 2024-08-18 RX ADMIN — SODIUM CHLORIDE, PRESERVATIVE FREE 10 ML: 5 INJECTION INTRAVENOUS at 21:59

## 2024-08-18 RX ADMIN — LACTULOSE 30 G: 20 SOLUTION ORAL at 17:51

## 2024-08-18 RX ADMIN — IPRATROPIUM BROMIDE AND ALBUTEROL SULFATE 1 DOSE: 2.5; .5 SOLUTION RESPIRATORY (INHALATION) at 18:59

## 2024-08-18 RX ADMIN — SODIUM CHLORIDE, PRESERVATIVE FREE 10 ML: 5 INJECTION INTRAVENOUS at 08:35

## 2024-08-18 RX ADMIN — WATER 40 MG: 1 INJECTION INTRAMUSCULAR; INTRAVENOUS; SUBCUTANEOUS at 02:23

## 2024-08-18 RX ADMIN — MIDODRINE HYDROCHLORIDE 5 MG: 5 TABLET ORAL at 17:51

## 2024-08-18 RX ADMIN — BUDESONIDE 500 MCG: 0.5 SUSPENSION RESPIRATORY (INHALATION) at 07:10

## 2024-08-18 RX ADMIN — SODIUM CHLORIDE, PRESERVATIVE FREE 40 MG: 5 INJECTION INTRAVENOUS at 21:59

## 2024-08-18 ASSESSMENT — PAIN SCALES - GENERAL
PAINLEVEL_OUTOF10: 0

## 2024-08-18 ASSESSMENT — PULMONARY FUNCTION TESTS
PIF_VALUE: 24
PIF_VALUE: 26
PIF_VALUE: 24
PIF_VALUE: 24
PIF_VALUE: 31
PIF_VALUE: 18
PIF_VALUE: 18

## 2024-08-18 NOTE — PROGRESS NOTES
Pt thrashing head side to side but denies pain. Writer reached out to Dr. Toney to clarify if he would like PRN fentanyl or versed given. Dr. Toney informed that RT was trying to switch pt to CPAP when he started to thrash head.     Orders given over phone to start precedex gtt for RASS 0 to -1.

## 2024-08-18 NOTE — PLAN OF CARE
Problem: Discharge Planning  Goal: Discharge to home or other facility with appropriate resources  Outcome: Progressing  Flowsheets (Taken 8/18/2024 0800)  Discharge to home or other facility with appropriate resources: Arrange for needed discharge resources and transportation as appropriate     Problem: Safety - Medical Restraint  Goal: Remains free of injury from restraints (Restraint for Interference with Medical Device)  Description: INTERVENTIONS:  1. Determine that other, less restrictive measures have been tried or would not be effective before applying the restraint  2. Evaluate the patient's condition at the time of restraint application  3. Inform patient/family regarding the reason for restraint  4. Q2H: Monitor safety, psychosocial status, comfort, nutrition and hydration  Outcome: Progressing  Flowsheets  Taken 8/18/2024 1811  Remains free of injury from restraints (restraint for interference with medical device): Determine that other, less restrictive measures have been tried or would not be effective before applying the restraint  Taken 8/18/2024 1800  Remains free of injury from restraints (restraint for interference with medical device): Determine that other, less restrictive measures have been tried or would not be effective before applying the restraint  Taken 8/18/2024 1600  Remains free of injury from restraints (restraint for interference with medical device): Determine that other, less restrictive measures have been tried or would not be effective before applying the restraint  Taken 8/18/2024 1400  Remains free of injury from restraints (restraint for interference with medical device): Determine that other, less restrictive measures have been tried or would not be effective before applying the restraint  Taken 8/18/2024 1200  Remains free of injury from restraints (restraint for interference with medical device): Determine that other, less restrictive measures have been tried or would not

## 2024-08-18 NOTE — PLAN OF CARE
Problem: Respiratory - Adult  Goal: Achieves optimal ventilation and oxygenation  8/18/2024 1908 by Ana Lilia Serrato RCP  Outcome: Progressing     Problem: Respiratory - Adult  Goal: Able to breathe comfortably  8/18/2024 1908 by Ana Lilia Serrato RCP  Outcome: Progressing     Problem: Respiratory - Adult  Goal: Patient's breath sounds will be clear and equal  8/18/2024 1908 by Ana Lilia Serrato RCP  Outcome: Progressing     Problem: Respiratory - Adult  Goal: Adequate oxygenation  Description: Adequate oxygenation  8/18/2024 1908 by Ana Lilia Serrato RCP  Outcome: Progressing     Problem: Respiratory - Adult  Goal: Will be able to breathe spontaneously, without ventilator support  Description: Will be able to breathe spontaneously, without ventilator support  8/18/2024 1908 by Ana Lilia Serrato RCP  Outcome: Progressing

## 2024-08-18 NOTE — CARE COORDINATION
DC Planning    CHART REVIEW     Pt remains on vent, cont on Precedex, will need to watch for LTACH/SNF. Pt is from home w wife. MRI brain per neuro.

## 2024-08-18 NOTE — PLAN OF CARE
Problem: Discharge Planning  Goal: Discharge to home or other facility with appropriate resources  8/17/2024 2055 by Leila Rooney RN  Outcome: Progressing  8/17/2024 1938 by Angelica Hodgson RN  Outcome: Progressing  Flowsheets (Taken 8/17/2024 0800)  Discharge to home or other facility with appropriate resources: Identify barriers to discharge with patient and caregiver     Problem: Safety - Medical Restraint  Goal: Remains free of injury from restraints (Restraint for Interference with Medical Device)  Description: INTERVENTIONS:  1. Determine that other, less restrictive measures have been tried or would not be effective before applying the restraint  2. Evaluate the patient's condition at the time of restraint application  3. Inform patient/family regarding the reason for restraint  4. Q2H: Monitor safety, psychosocial status, comfort, nutrition and hydration  8/17/2024 2055 by Leila Rooney RN  Outcome: Progressing  Flowsheets (Taken 8/17/2024 2000)  Remains free of injury from restraints (restraint for interference with medical device):   Determine that other, less restrictive measures have been tried or would not be effective before applying the restraint   Evaluate the patient's condition at the time of restraint application   Inform patient/family regarding the reason for restraint   Every 2 hours: Monitor safety, psychosocial status, comfort, nutrition and hydration  8/17/2024 1938 by Angelica Hodgson, RN  Outcome: Progressing  Flowsheets  Taken 8/17/2024 1811  Remains free of injury from restraints (restraint for interference with medical device): Determine that other, less restrictive measures have been tried or would not be effective before applying the restraint  Taken 8/17/2024 1800  Remains free of injury from restraints (restraint for interference with medical device): Determine that other, less restrictive measures have been tried or would not be effective before applying the  Progressing  8/17/2024 1938 by Angelica Hodgson, RN  Outcome: Progressing

## 2024-08-18 NOTE — PROGRESS NOTES
End Of Shift Note  [facility]  Summary of shift: Pt following commands and nod/gesture head appropriately. Restraints remain applied d/t pt reaching for ETT with stimulation. RT switched pt to CPAP at 0330: pt became tachypneic ,diaphoretic, and TV <10. RT at bedside to ambu bag patient until 1mg IVP Versed ordered by CC. Pt recovered after Versed given. Precedex remains on at 0.2mcg/kg/hr. No insulin coverage needed this shift. TF cont at goal with little residuals. Lactulose given x2 throughout shift, x2 large BM. Ammonia 118, BUN 52, K 5.6 this AM, attending NP notified: no new orders. Q2 turns, bath given. AIDAN removed d/t leaking. CXR pend. Pt's son at bedside throughout night,all concerns/questions addressed. 400mL urine output measured. Swelling in extremities +1 pitting.     Vitals:    Vitals:    08/18/24 0545 08/18/24 0600 08/18/24 0615 08/18/24 0630   BP:  106/72     Pulse: 63 60 62 58   Resp: 21 20 19 18   Temp:       TempSrc:       SpO2: 95% 96% 96% 96%   Weight:       Height:            I&O:   Intake/Output Summary (Last 24 hours) at 8/18/2024 0639  Last data filed at 8/18/2024 0615  Gross per 24 hour   Intake 1082.66 ml   Output 1150 ml   Net -67.34 ml       Resp Status: Vent settings below    Ventilator Settings:  Vent Mode: AC/PRVC Resp Rate (Set): 14 bpm/Vt (Set, mL): 550 mL/ /FiO2 : 40 %    Critical Care IV infusions:   dexmedeTOMIDine (PRECEDEX) 400 mcg in sodium chloride 0.9 % 100 mL infusion 0.2 mcg/kg/hr (08/18/24 0505)    [Held by provider] sodium chloride Stopped (08/13/24 1139)    dextrose      sodium chloride 10 mL/hr at 08/11/24 0628    midazolam Stopped (08/16/24 0700)    norepinephrine Stopped (08/16/24 0700)        LDA:   PICC 08/10/24 Left Brachial (Active)   Number of days: 7       NG/OG/NJ/NE Tube Orogastric Center mouth (Active)   Number of days: 7       Urinary Catheter 08/10/24 Roca (Active)   Number of days: 8       ETT  (Active)   Number of days: 8

## 2024-08-18 NOTE — PROGRESS NOTES
Pulmonary Critical Care Progress Note    Patient seen for the follow up of COPD exacerbation (HCC) , resp failure    Subjective:    Currently on wean peep 8 / PS 10, was placed back on full vent settings overnight and needed ambu'd for a short time - possible mucous plug - better this morning. Ammonia remains >100. Had one stool on day shift yesterday and two overnight - have been large stools per RN  Examination:    Vitals: /64   Pulse 58   Temp 98.1 °F (36.7 °C) (Oral)   Resp 20   Ht 1.803 m (5' 11\")   Wt 92.1 kg (203 lb)   SpO2 97%   BMI 28.31 kg/m²   SpO2  Av.1 %  Min: 92 %  Max: 100 %  General appearance: Intubated, on SBT  Neck: No JVD  Lungs: Decreased breath sound no crackles or wheeze  Heart: irregular rate and rhythm, S1, S2 normal, no gallop  Abdomen: Soft, non tender, + BS  Extremities: no cyanosis or clubbing. Bilat edema of hands    LABs:    CBC:   Recent Labs     24  0346 24  0352   WBC 5.8 8.6   HGB 11.7* 12.9*   HCT 35.4* 39.2*   PLT See Reflexed IPF Result 50*     BMP:   Recent Labs     24  0346 24  0352    143   K 5.0 5.6*   CO2 25 26   BUN 48* 52*   CREATININE 1.1 1.0   LABGLOM 70 78   GLUCOSE 180* 174*         LIVER PROFILE:  Recent Labs     24  0346 24  0352   AST 70* 74*   * 133*      Latest Reference Range & Units 24 04:29   POC HCO3 21.0 - 28.0 mmol/L 21.7   POC O2 SAT 94.0 - 98.0 % 98.2 (H)   POC pCO2 35.0 - 48.0 mm Hg 36.7   POC pH 7.350 - 7.450  7.379   POC PO2 83.0 - 108.0 mm Hg 110.0 (H)   (H): Data is abnormally high     Latest Reference Range & Units 24 12:55 24 03:43 08/15/24 09:00 24 04:12   Ammonia 16 - 60 umol/L 62 (H) 58 92 (H) 127 (HH)   (HH): Data is critically high  (H): Data is abnormally high    Radiology:    Chest x-ray   Reviewed  1. No acute cardiopulmonary process.  2. Support devices are stable in position.     Impression/recommendations;    Acute hypoxic and hypercarbic

## 2024-08-18 NOTE — PROGRESS NOTES
Progress note  [facility]    Adult Hospitalist      Name: Aiden Black  MRN: [id number]     Acct: [id number]  Room: [address] [place] [address]-    Admit Date: 8/9/2024  6:45 PM  PCP: Angelica Cruz MD    Primary Problem  Principal Problem:    COPD exacerbation (HCC)  Active Problems:    Hypoxia    Acute encephalopathy    Abnormal finding on MRI of brain    Elevated CSF protein  Resolved Problems:    * No resolved hospital problems. *        Assesment/ plan:       Acute COPD exacerbation  IV Solu-Medrol  Duo nebs   Respiratory pathogen panel negative  Sputum culture   S/p IV ceftriaxone and Zithromax  Consult pulmonology - appreciate recs    Acute hypoxic and hypercarbic respiratory failure   Secondary to above   Mechanical ventilation sedation as per Critical Care   MRSA PCR negative  Neurology consulted for acute encephalopathy  Tube feeds started   Plan extubation later today once ok w CC    Encephalopathy:  -MRI EEG done  - Discussed case with neuro - plans for LP. Discussed with family in conjunction with neuro. Questions answered. If required, will give platelets prior to procedure, per IR protocols  -Neurology following.  Physical restraints for one day    Shock, etiology unclear  Monitor blood pressure   Initially sammy and now levophed.   Midodrine tid  Infectious work up otherwise NTD.   IV fluids  Lactate 1.7  Procalcitonin 0.08  Blood culture NTD  Sputum culture    Hyperglycemia  - IV fluids switched to normal saline  - 400s-->122    Cirrhosis  Patient has had history of esophageal varices  Patient has OG tube.  Monitor for bleeding  PPI twice a day  Monitor LFTs  Currently on lactulose and Xifaxan   Liver ultrasound - cirrhotic liver and hepatomegaly  GI consult - no egd planned.   AFP 2.1  H/H - 11.9  PPI BID  LFTs trending up.   T bili trending down    Sinus Bradycardia  - cardiology following. No evidence of High grade AV block or conduction disease. Continue to monitor. Echo .     Goals of care  -    MCH 35.8*  --  35.0* 35.3*   MCHC 34.6  --  33.2 33.1   RDW 14.1  --  14.3 14.5*   PLT 65*  --  53* See Reflexed IPF Result   MPV 10.5  --  10.9  --    INR  --  1.6  --   --      Chemistry:  Recent Labs     08/16/24  1400 08/16/24  2043 08/17/24  0346    143 143   K 5.4* 5.1 5.0   * 111* 110*   CO2 26 26 25   GLUCOSE 168* 157* 180*   BUN 48* 48* 48*   CREATININE 1.1 1.1 1.1   ANIONGAP 7* 6* 8*   LABGLOM 70 70 70   CALCIUM 8.8 8.8 8.7     Recent Labs     08/15/24  0440 08/15/24  0536 08/15/24  0900 08/15/24  1150 08/16/24  0412 08/16/24  0536 08/16/24  1746 08/16/24  2317 08/17/24  0346 08/17/24  0504 08/17/24  0534 08/17/24  1059 08/17/24  1736   *  --   --   --  56*  --   --   --  70*  --   --   --   --    *  --   --   --  88*  --   --   --  115*  --   --   --   --    ALKPHOS 75  --   --   --  83  --   --   --  116  --   --   --   --    BILITOT 1.7*  --   --   --  1.5*  --   --   --  1.6*  --   --   --   --    AMMONIA  --   --  92*  --  127*  --   --   --  119*  --   --   --   --    POCGLU  --    < >  --    < >  --    < > 161* 160*  --  165* 148* 166* 147*    < > = values in this interval not displayed.       Lab Results   Component Value Date    INR 1.6 08/15/2024    INR 1.4 08/10/2024    INR 1.2 (H) 02/22/2024    PROTIME 19.3 (H) 08/15/2024    PROTIME 16.8 (H) 08/10/2024    PROTIME 12.1 (H) 02/22/2024       Lab Results   Component Value Date/Time    SPECIAL LH 10 ML 08/10/2024 12:30 PM     Lab Results   Component Value Date/Time    CULTURE NO GROWTH 21 HOURS 08/16/2024 08:30 AM       Lab Results   Component Value Date/Time    POCPH 7.378 08/15/2024 04:34 AM    POCPCO2 39.9 08/15/2024 04:34 AM    POCPO2 112.1 08/15/2024 04:34 AM    POCHCO3 23.5 08/15/2024 04:34 AM    NBEA 1.6 08/15/2024 04:34 AM    PBEA 1.6 08/11/2024 06:01 AM    HLZZ4CWT 98.3 08/15/2024 04:34 AM    FIO2 30.0 08/15/2024 04:34 AM       Radiology:    XR CHEST PORTABLE    Result Date: 8/10/2024  1. Endotracheal tube in  satisfactory position. 2. No acute cardiopulmonary process.     CT HEAD WO CONTRAST    Result Date: 8/10/2024  No acute intracranial abnormality.     XR CHEST PORTABLE    Result Date: 8/9/2024  No acute cardiopulmonary disease.         All radiological studies reviewed                Code Status:  DNR-CCA    Electronically signed by Mamadou Lagunas MD on 8/17/2024 at 9:41 PM     Copy sent to Angelica Jaimes MD    This note was created with the assistance of a speech-recognition program.  Although the intention is to generate a document that actually reflects the content of the visit, no guarantees can be provided that every mistake has been identified and corrected by editing.     Note was updated later by me after  physical examination and  completion of the assessment.

## 2024-08-18 NOTE — PLAN OF CARE
Problem: Respiratory - Adult  Goal: Achieves optimal ventilation and oxygenation  8/18/2024 0832 by Kathy Bales RCP  Outcome: Progressing  8/17/2024 2055 by Leila Rooney, RN  Outcome: Progressing  8/17/2024 1938 by Angelica Hodgson, RN  Outcome: Progressing  8/17/2024 1913 by Ana Lilia Serrato RCP  Outcome: Progressing  Flowsheets (Taken 8/17/2024 0800 by Angelica Hodgson, RN)  Achieves optimal ventilation and oxygenation: Assess for changes in respiratory status  Goal: Able to breathe comfortably  8/18/2024 0832 by Kathy Bales RCP  Outcome: Progressing  8/17/2024 1913 by Ana Lilia Serrato RCP  Outcome: Progressing  Goal: Patient's breath sounds will be clear and equal  8/18/2024 0832 by Kathy Bales RCP  Outcome: Progressing  8/17/2024 1913 by Ana Lilia Serrato RCP  Outcome: Progressing  Goal: Adequate oxygenation  Description: Adequate oxygenation  8/18/2024 0832 by Kathy Bales RCP  Outcome: Progressing  8/17/2024 1913 by Ana Lilia Serrato RCP  Outcome: Progressing  Goal: Will be able to breathe spontaneously, without ventilator support  Description: Will be able to breathe spontaneously, without ventilator support  8/18/2024 0832 by Kathy Bales RCP  Outcome: Progressing  8/17/2024 1913 by Ana Lilia Serrato RCP  Outcome: Progressing

## 2024-08-18 NOTE — PROGRESS NOTES
End Of Shift Note  [facility]  Summary of shift: Pt cpap for 4-6 hrs before increased RR in 30's; Precedex turned up to max of 0.4 for moving head back in forth in agitation; no residuals, increased rifaximin, no BM this shift; 70mEq K+ replaced this shift; 30mmol sodium phos, family at bedside entire shift, intermittently follows commands    Vitals:    Vitals:    08/18/24 1845 08/18/24 1859 08/18/24 1900 08/18/24 1910   BP:   119/73    Pulse: 58 60 60 62   Resp: 22 21 22 14   Temp:       TempSrc:       SpO2: 98% 98% 97% 98%   Weight:       Height:            I&O:   Intake/Output Summary (Last 24 hours) at 8/18/2024 1911  Last data filed at 8/18/2024 1807  Gross per 24 hour   Intake 1235.35 ml   Output 850 ml   Net 385.35 ml       Resp Status: Clear BUL/Diminished BLL    Ventilator Settings:  Vent Mode: AC/PRVC Resp Rate (Set): 14 bpm/Vt (Set, mL): 550 mL/ /FiO2 : 40 %    Critical Care IV infusions:   dexmedeTOMIDine (PRECEDEX) 400 mcg in sodium chloride 0.9 % 100 mL infusion 0.2 mcg/kg/hr (08/18/24 1806)    [Held by provider] sodium chloride Stopped (08/13/24 1139)    dextrose      sodium chloride 10 mL/hr at 08/11/24 0628    midazolam Stopped (08/16/24 0700)    norepinephrine Stopped (08/16/24 0700)        LDA:   PICC 08/10/24 Left Brachial (Active)   Number of days: 8       NG/OG/NJ/NE Tube Orogastric Center mouth (Active)   Number of days: 8       Urinary Catheter 08/10/24 Roca (Active)   Number of days: 8       ETT  (Active)   Number of days: 8

## 2024-08-18 NOTE — PROGRESS NOTES
[facility]   IN-PATIENT SERVICE      NEUROLOGY PROGRESS  NOTE            Date:   8/18/2024  Patient name:  Aiden Black  Date of admission:  8/9/2024  YOB: 1950      Interval History:     8/18: Overnight, he became restless, there was concern for vent desynchrony.  Started on Precedex gtt.  Also received Versed.    8/17: Remains intubated.  Family states he is more awake, following commands.  He has been reaching for ETT.  CSF studies reviewed and discussed with family.  Continues to have elevated ammonia although trending down today.    8/16: LP done today.  Results pending.  He has been more awake, has been following commands.  Remains intubated.  Family at bedside also agree that he is improved.    8/15: No acute events.  Per nursing staff, overnight and this morning, he has been opening his eyes and intermittently following commands.  Family at bedside also report that he appears more awake.  He remains intubated, currently undergoing CPAP trial.  MRI brain and EEG done.    History of Present Illness:     73 year old male with past medical history of emphysema/COPD, HTN, CAD, cirrhosis, who is currently admitted to ICU for COPD exacerbation s/p intubation/MV. Neurology consulted as patient remains minimally responsive despite being off sedation.  History limited given patient's current clinical status.  Obtained primarily from primary team, nursing staff, and family at bedside.     Patient has been admitted since 8/9/2024.  He initially presented to OSH for shortness of breath.  Diagnosed with COPD exacerbation.  He was given breathing treatments and had mild improvement in symptoms.  Subsequently transferred to [facility] for further evaluation.  Upon arrival, he was noted to be obtunded, unresponsive.  ABG demonstrated significant CO2 retention with pCO2 119.  He was initially started on BiPAP but continued to be hypercapnic and obtunded.  Subsequently, was intubated and started on  portion of the orbits demonstrate no acute abnormality.    SINUSES: The visualized paranasal sinuses and mastoid air cells demonstrate  no acute abnormality.    SOFT TISSUES/SKULL:  No acute abnormality of the visualized skull or soft  tissues.    Impression  No acute intracranial abnormality.          I personally reviewed all of the above medications, clinical laboratory, imaging and other diagnostic tests.           Impression:       73 year old male with past medical history of emphysema/COPD, HTN, CAD, cirrhosis, who is currently admitted to ICU for COPD exacerbation s/p intubation/MV. Neurology consulted initially as patient was minimally responsive despite being off sedation.     Clinical presentation warranted further evaluation.  Possible prolonged anesthetic effects given his baseline cirrhosis limiting clearance of medications.  He also had an SHIVANI. Continues to have elevated ammonia. EEG with slowing, no epileptiform discharges or seizures.  MRI brain was obtained and demonstrated atrophy along with white matter changes.  There was also increased signal throughout some of the bilateral sulci on FLAIR consistent with artifact vs ?proteinaceous fluid/SAH.  LP pursued.  CSF studies thus far negative for infectious etiologies.  Noted to have elevated protein at 181.  Can be nonspecific.  Some case reports have demonstrated elevated CSF protein in patients with hepatic coma.  Nonetheless, added further CSF studies for infectious/inflammatory processes-thus far negative.  Cytology negative.  Clinically, he had improvement in mentation-was following commands but he has now been started on Precedex, also received midazolam and is not consistently following commands today.     Acute encephalopathy, likely component of toxic-metabolic confounders as above in a patient with history of cirrhosis/elevated ammonia, MRI brain with increased signal throughout sulci, ?artifact. CSF with elevated protein. Ddx as

## 2024-08-18 NOTE — PROGRESS NOTES
Pt RR in the 50-60's, peak pressure in the 60's and TV <50. Pt visually diaphoretic with temp 98.6 axillary. RT called to bedside.     Writer contacted Dr. Toney regarding non-compliance with ventilator while RT at bedside. Order given for 1mg Versed IVPx1.     Writer gave 1mg Versed IVP and vent compliance returned.    13-Nov-2018 22:20

## 2024-08-18 NOTE — PROGRESS NOTES
Increased Precedex to 0.4, repositioned patient, and given time to relax; pt lying in bed, RR 30; RT notified.

## 2024-08-19 ENCOUNTER — APPOINTMENT (OUTPATIENT)
Dept: GENERAL RADIOLOGY | Age: 74
End: 2024-08-19
Payer: MEDICARE

## 2024-08-19 ENCOUNTER — APPOINTMENT (OUTPATIENT)
Dept: MRI IMAGING | Age: 74
End: 2024-08-19
Payer: MEDICARE

## 2024-08-19 LAB
ALBUMIN SERPL-MCNC: 2.7 G/DL (ref 3.5–5.2)
ALP SERPL-CCNC: 136 U/L (ref 40–129)
ALT SERPL-CCNC: 111 U/L (ref 5–41)
AMMONIA PLAS-SCNC: 120 UMOL/L (ref 16–60)
ANION GAP SERPL CALCULATED.3IONS-SCNC: 7 MMOL/L (ref 9–17)
ANION GAP SERPL CALCULATED.3IONS-SCNC: 7 MMOL/L (ref 9–17)
AST SERPL-CCNC: 56 U/L
B BURGDOR AB CSF IA-ACNC: 0.22 IV
BASOPHILS # BLD: 0 K/UL (ref 0–0.2)
BASOPHILS NFR BLD: 0 % (ref 0–2)
BILIRUB SERPL-MCNC: 1.5 MG/DL (ref 0.3–1.2)
BUN SERPL-MCNC: 54 MG/DL (ref 8–23)
BUN SERPL-MCNC: 54 MG/DL (ref 8–23)
BUN/CREAT SERPL: 54 (ref 9–20)
BUN/CREAT SERPL: 54 (ref 9–20)
CALCIUM SERPL-MCNC: 8.6 MG/DL (ref 8.6–10.4)
CALCIUM SERPL-MCNC: 8.6 MG/DL (ref 8.6–10.4)
CHLORIDE SERPL-SCNC: 107 MMOL/L (ref 98–107)
CHLORIDE SERPL-SCNC: 110 MMOL/L (ref 98–107)
CO2 SERPL-SCNC: 26 MMOL/L (ref 20–31)
CO2 SERPL-SCNC: 27 MMOL/L (ref 20–31)
CREAT SERPL-MCNC: 1 MG/DL (ref 0.7–1.2)
CREAT SERPL-MCNC: 1 MG/DL (ref 0.7–1.2)
CSF ISOELECTRIC FOCUSING INTERPRETATION: NORMAL
EOSINOPHIL # BLD: 0 K/UL (ref 0–0.44)
EOSINOPHILS RELATIVE PERCENT: 0 % (ref 1–4)
ERYTHROCYTE [DISTWIDTH] IN BLOOD BY AUTOMATED COUNT: 14.8 % (ref 11.8–14.4)
GFR, ESTIMATED: 78 ML/MIN/1.73M2
GFR, ESTIMATED: 78 ML/MIN/1.73M2
GLUCOSE BLD-MCNC: 144 MG/DL (ref 75–110)
GLUCOSE BLD-MCNC: 180 MG/DL (ref 75–110)
GLUCOSE BLD-MCNC: 188 MG/DL (ref 75–110)
GLUCOSE BLD-MCNC: 204 MG/DL (ref 75–110)
GLUCOSE SERPL-MCNC: 145 MG/DL (ref 70–99)
GLUCOSE SERPL-MCNC: 227 MG/DL (ref 70–99)
HCT VFR BLD AUTO: 36.2 % (ref 40.7–50.3)
HGB BLD-MCNC: 12.1 G/DL (ref 13–17)
IMM GRANULOCYTES # BLD AUTO: 0.07 K/UL (ref 0–0.3)
IMM GRANULOCYTES NFR BLD: 1 %
LYMPHOCYTES NFR BLD: 0.58 K/UL (ref 1.1–3.7)
LYMPHOCYTES RELATIVE PERCENT: 8 % (ref 24–43)
MCH RBC QN AUTO: 35.8 PG (ref 25.2–33.5)
MCHC RBC AUTO-ENTMCNC: 33.4 G/DL (ref 28.4–34.8)
MCV RBC AUTO: 107.1 FL (ref 82.6–102.9)
MICROORGANISM SPEC CULT: NORMAL
MICROORGANISM/AGENT SPEC: NORMAL
MICROORGANISM/AGENT SPEC: NORMAL
MONOCYTES NFR BLD: 0.36 K/UL (ref 0.1–1.2)
MONOCYTES NFR BLD: 5 % (ref 3–12)
NEUTROPHILS NFR BLD: 86 % (ref 36–65)
NEUTS SEG NFR BLD: 6.19 K/UL (ref 1.5–8.1)
NRBC BLD-RTO: 0 PER 100 WBC
OLIGOCLONAL BANDS CSF IEF: 0 BANDS (ref 0–1)
OLIGOCLONAL BANDS: NEGATIVE
PLATELET # BLD AUTO: ABNORMAL K/UL (ref 138–453)
PLATELET, FLUORESCENCE: 47 K/UL (ref 138–453)
PLATELETS.RETICULATED NFR BLD AUTO: 5.2 % (ref 1.1–10.3)
POTASSIUM SERPL-SCNC: 5.9 MMOL/L (ref 3.7–5.3)
POTASSIUM SERPL-SCNC: 6 MMOL/L (ref 3.7–5.3)
POTASSIUM SERPL-SCNC: 6 MMOL/L (ref 3.7–5.3)
POTASSIUM SERPL-SCNC: 6.1 MMOL/L (ref 3.7–5.3)
PROT SERPL-MCNC: 4.8 G/DL (ref 6.4–8.3)
RBC # BLD AUTO: 3.38 M/UL (ref 4.21–5.77)
SODIUM SERPL-SCNC: 141 MMOL/L (ref 135–144)
SODIUM SERPL-SCNC: 143 MMOL/L (ref 135–144)
SPECIMEN DESCRIPTION: NORMAL
SURGICAL PATHOLOGY REPORT: NORMAL
WBC OTHER # BLD: 7.2 K/UL (ref 3.5–11.3)

## 2024-08-19 PROCEDURE — 6370000000 HC RX 637 (ALT 250 FOR IP): Performed by: INTERNAL MEDICINE

## 2024-08-19 PROCEDURE — 6370000000 HC RX 637 (ALT 250 FOR IP): Performed by: FAMILY MEDICINE

## 2024-08-19 PROCEDURE — 36415 COLL VENOUS BLD VENIPUNCTURE: CPT

## 2024-08-19 PROCEDURE — 82140 ASSAY OF AMMONIA: CPT

## 2024-08-19 PROCEDURE — 2580000003 HC RX 258: Performed by: INTERNAL MEDICINE

## 2024-08-19 PROCEDURE — 94640 AIRWAY INHALATION TREATMENT: CPT

## 2024-08-19 PROCEDURE — 6370000000 HC RX 637 (ALT 250 FOR IP): Performed by: NURSE PRACTITIONER

## 2024-08-19 PROCEDURE — 2580000003 HC RX 258: Performed by: NURSE PRACTITIONER

## 2024-08-19 PROCEDURE — 2700000000 HC OXYGEN THERAPY PER DAY

## 2024-08-19 PROCEDURE — 6360000002 HC RX W HCPCS: Performed by: INTERNAL MEDICINE

## 2024-08-19 PROCEDURE — 85025 COMPLETE CBC W/AUTO DIFF WBC: CPT

## 2024-08-19 PROCEDURE — 84132 ASSAY OF SERUM POTASSIUM: CPT

## 2024-08-19 PROCEDURE — 85055 RETICULATED PLATELET ASSAY: CPT

## 2024-08-19 PROCEDURE — 94003 VENT MGMT INPAT SUBQ DAY: CPT

## 2024-08-19 PROCEDURE — 80048 BASIC METABOLIC PNL TOTAL CA: CPT

## 2024-08-19 PROCEDURE — 94761 N-INVAS EAR/PLS OXIMETRY MLT: CPT

## 2024-08-19 PROCEDURE — 99233 SBSQ HOSP IP/OBS HIGH 50: CPT | Performed by: PSYCHIATRY & NEUROLOGY

## 2024-08-19 PROCEDURE — 2000000000 HC ICU R&B

## 2024-08-19 PROCEDURE — 80053 COMPREHEN METABOLIC PANEL: CPT

## 2024-08-19 PROCEDURE — 74018 RADEX ABDOMEN 1 VIEW: CPT

## 2024-08-19 PROCEDURE — 71045 X-RAY EXAM CHEST 1 VIEW: CPT

## 2024-08-19 PROCEDURE — 82947 ASSAY GLUCOSE BLOOD QUANT: CPT

## 2024-08-19 RX ORDER — FUROSEMIDE 10 MG/ML
40 INJECTION INTRAMUSCULAR; INTRAVENOUS ONCE
Status: COMPLETED | OUTPATIENT
Start: 2024-08-19 | End: 2024-08-19

## 2024-08-19 RX ORDER — LACTULOSE 10 G/15ML
30 SOLUTION ORAL
Status: DISCONTINUED | OUTPATIENT
Start: 2024-08-19 | End: 2024-08-21

## 2024-08-19 RX ORDER — FUROSEMIDE 10 MG/ML
40 INJECTION INTRAMUSCULAR; INTRAVENOUS ONCE
Status: COMPLETED | OUTPATIENT
Start: 2024-08-20 | End: 2024-08-20

## 2024-08-19 RX ADMIN — LACTULOSE 30 G: 20 SOLUTION ORAL at 15:50

## 2024-08-19 RX ADMIN — LORAZEPAM 1 MG: 2 INJECTION INTRAMUSCULAR; INTRAVENOUS at 17:36

## 2024-08-19 RX ADMIN — SODIUM CHLORIDE, PRESERVATIVE FREE 10 ML: 5 INJECTION INTRAVENOUS at 20:40

## 2024-08-19 RX ADMIN — DEXTROSE MONOHYDRATE 125 ML: 100 INJECTION, SOLUTION INTRAVENOUS at 16:18

## 2024-08-19 RX ADMIN — SODIUM ZIRCONIUM CYCLOSILICATE 10 G: 10 POWDER, FOR SUSPENSION ORAL at 05:22

## 2024-08-19 RX ADMIN — RIFAXIMIN 400 MG: 200 TABLET ORAL at 08:22

## 2024-08-19 RX ADMIN — CETIRIZINE HYDROCHLORIDE 10 MG: 10 TABLET, FILM COATED ORAL at 08:22

## 2024-08-19 RX ADMIN — WATER 40 MG: 1 INJECTION INTRAMUSCULAR; INTRAVENOUS; SUBCUTANEOUS at 17:36

## 2024-08-19 RX ADMIN — BUDESONIDE 500 MCG: 0.5 SUSPENSION RESPIRATORY (INHALATION) at 07:27

## 2024-08-19 RX ADMIN — ARFORMOTEROL TARTRATE 15 MCG: 15 SOLUTION RESPIRATORY (INHALATION) at 19:30

## 2024-08-19 RX ADMIN — INSULIN LISPRO 1 UNITS: 100 INJECTION, SOLUTION INTRAVENOUS; SUBCUTANEOUS at 00:12

## 2024-08-19 RX ADMIN — IPRATROPIUM BROMIDE AND ALBUTEROL SULFATE 1 DOSE: 2.5; .5 SOLUTION RESPIRATORY (INHALATION) at 14:39

## 2024-08-19 RX ADMIN — IPRATROPIUM BROMIDE AND ALBUTEROL SULFATE 1 DOSE: 2.5; .5 SOLUTION RESPIRATORY (INHALATION) at 19:30

## 2024-08-19 RX ADMIN — SODIUM CHLORIDE, PRESERVATIVE FREE 40 MG: 5 INJECTION INTRAVENOUS at 20:35

## 2024-08-19 RX ADMIN — FUROSEMIDE 40 MG: 10 INJECTION, SOLUTION INTRAMUSCULAR; INTRAVENOUS at 20:38

## 2024-08-19 RX ADMIN — SODIUM CHLORIDE, PRESERVATIVE FREE 10 ML: 5 INJECTION INTRAVENOUS at 08:23

## 2024-08-19 RX ADMIN — SODIUM ZIRCONIUM CYCLOSILICATE 5 G: 5 POWDER, FOR SUSPENSION ORAL at 13:29

## 2024-08-19 RX ADMIN — BUDESONIDE 500 MCG: 0.5 SUSPENSION RESPIRATORY (INHALATION) at 19:30

## 2024-08-19 RX ADMIN — DEXTROSE MONOHYDRATE 250 ML: 100 INJECTION, SOLUTION INTRAVENOUS at 20:32

## 2024-08-19 RX ADMIN — IPRATROPIUM BROMIDE AND ALBUTEROL SULFATE 1 DOSE: 2.5; .5 SOLUTION RESPIRATORY (INHALATION) at 07:27

## 2024-08-19 RX ADMIN — ARFORMOTEROL TARTRATE 15 MCG: 15 SOLUTION RESPIRATORY (INHALATION) at 07:27

## 2024-08-19 RX ADMIN — RIFAXIMIN 400 MG: 200 TABLET ORAL at 20:38

## 2024-08-19 RX ADMIN — SODIUM CHLORIDE, PRESERVATIVE FREE 40 MG: 5 INJECTION INTRAVENOUS at 08:22

## 2024-08-19 RX ADMIN — INSULIN HUMAN 10 UNITS: 100 INJECTION, SOLUTION PARENTERAL at 16:05

## 2024-08-19 RX ADMIN — MIDODRINE HYDROCHLORIDE 5 MG: 5 TABLET ORAL at 08:22

## 2024-08-19 RX ADMIN — IPRATROPIUM BROMIDE AND ALBUTEROL SULFATE 1 DOSE: 2.5; .5 SOLUTION RESPIRATORY (INHALATION) at 10:44

## 2024-08-19 RX ADMIN — RIFAXIMIN 400 MG: 200 TABLET ORAL at 15:51

## 2024-08-19 RX ADMIN — DEXTROSE MONOHYDRATE 125 ML: 100 INJECTION, SOLUTION INTRAVENOUS at 16:05

## 2024-08-19 RX ADMIN — WATER 40 MG: 1 INJECTION INTRAMUSCULAR; INTRAVENOUS; SUBCUTANEOUS at 03:30

## 2024-08-19 RX ADMIN — LACTULOSE 30 G: 20 SOLUTION ORAL at 05:22

## 2024-08-19 RX ADMIN — LORAZEPAM 1 MG: 2 INJECTION INTRAMUSCULAR; INTRAVENOUS at 22:11

## 2024-08-19 RX ADMIN — LACTULOSE 30 G: 20 SOLUTION ORAL at 00:12

## 2024-08-19 RX ADMIN — FENTANYL CITRATE 25 MCG: 0.05 INJECTION, SOLUTION INTRAMUSCULAR; INTRAVENOUS at 11:06

## 2024-08-19 RX ADMIN — INSULIN HUMAN 10 UNITS: 100 INJECTION, SOLUTION PARENTERAL at 20:33

## 2024-08-19 ASSESSMENT — PULMONARY FUNCTION TESTS
PIF_VALUE: 18
PIF_VALUE: 22
PIF_VALUE: 18
PIF_VALUE: 19
PIF_VALUE: 17
PIF_VALUE: 20
PIF_VALUE: 20

## 2024-08-19 ASSESSMENT — PAIN SCALES - GENERAL
PAINLEVEL_OUTOF10: 0
PAINLEVEL_OUTOF10: 0
PAINLEVEL_OUTOF10: 6
PAINLEVEL_OUTOF10: 0

## 2024-08-19 ASSESSMENT — PAIN DESCRIPTION - DESCRIPTORS: DESCRIPTORS: PATIENT UNABLE TO DESCRIBE

## 2024-08-19 NOTE — PROGRESS NOTES
End Of Shift Note  [facility]  Summary of shift: Pt had uneventful shift. Not responding to commands but withdraws from pain in all extremities. Bath given and dressings changed per protocol. K this AM 6.0: NP notified and 10g lokelma given, recheck BMP at 1600. 1BM this shift, 275mL u/o measured from griffith. Precedex off at 0200. Pt began CPAP at 0400 and tolerating well. Insulin coverage given x1. CXR pend. Wife at bedside throughout night and updated: questions/concerns addressed.     Vitals:    Vitals:    08/19/24 0500 08/19/24 0515 08/19/24 0530 08/19/24 0545   BP: 108/63      Pulse: 62 64 61 66   Resp: 24 27 22 23   Temp:       TempSrc:       SpO2: 97% 97% 97% 97%   Weight:       Height:            I&O:   Intake/Output Summary (Last 24 hours) at 8/19/2024 0605  Last data filed at 8/19/2024 0528  Gross per 24 hour   Intake 1978.21 ml   Output 725 ml   Net 1253.21 ml       Resp Status: Vent settings below    Ventilator Settings:  Vent Mode: AC/PRVC Resp Rate (Set): 14 bpm/Vt (Set, mL): 550 mL/ /FiO2 : 30 %    Critical Care IV infusions:   dexmedeTOMIDine (PRECEDEX) 400 mcg in sodium chloride 0.9 % 100 mL infusion Stopped (08/19/24 0204)    [Held by provider] sodium chloride Stopped (08/13/24 1139)    dextrose      sodium chloride 10 mL/hr at 08/11/24 0628    midazolam Stopped (08/16/24 0700)    norepinephrine Stopped (08/16/24 0700)        LDA:   PICC 08/10/24 Left Brachial (Active)   Number of days: 8       NG/OG/NJ/NE Tube Orogastric Center mouth (Active)   Number of days: 8       Urinary Catheter 08/10/24 Griffith (Active)   Number of days: 9       ETT  (Active)   Number of days: 9

## 2024-08-19 NOTE — PROGRESS NOTES
Pulmonary Critical Care Progress Note    Patient seen for the follow up of COPD exacerbation (HCC)     Subjective:    He is tolerated CPAP 8 cm H2O 30% FiO2 today pressure support of 10.  He is more responsive opens eyes and follow commands.  He was on full vent support FiO2 30% tidal volume 550 rate of 18 PEEP of 8.  He tolerates tube feeds   .  He had few bowel movements.  He received Lokelma for increased potassium.  I repeated potassium    Examination:    Vitals: /61   Pulse 73   Temp 98.5 °F (36.9 °C) (Oral)   Resp 20   Ht 1.803 m (5' 11\")   Wt 106.3 kg (234 lb 5.6 oz)   SpO2 96%   BMI 32.69 kg/m²   SpO2  Av.9 %  Min: 96 %  Max: 100 %  General appearance: Intubated lethargic eyes open  Neck: No JVD  Lungs: Decreased breath sound no crackles or wheeze labored  Heart: regular rate and rhythm, S1, S2 normal, no gallop  Abdomen: Soft, non tender, + BS  Extremities: no cyanosis or clubbing. No significant edema    LABs:    CBC:   Recent Labs     24   WBC 8.6 7.2   HGB 12.9* 12.1*   HCT 39.2* 36.2*   PLT 50* See Reflexed IPF Result     BMP:   Recent Labs     24  03524  1149    143  --    K 5.6* 6.0* 6.1*   CO2 26 26  --    BUN 52* 54*  --    CREATININE 1.0 1.0  --    LABGLOM 78 78  --    GLUCOSE 174* 227*  --          LIVER PROFILE:  Recent Labs     24   AST 74* 56*   * 111*      Latest Reference Range & Units 24 04:29   POC HCO3 21.0 - 28.0 mmol/L 21.7   POC O2 SAT 94.0 - 98.0 % 98.2 (H)   POC pCO2 35.0 - 48.0 mm Hg 36.7   POC pH 7.350 - 7.450  7.379   POC PO2 83.0 - 108.0 mm Hg 110.0 (H)   (H): Data is abnormally high     Latest Reference Range & Units 24 12:55 24 03:43 08/15/24 09:00 24 04:12   Ammonia 16 - 60 umol/L 62 (H) 58 92 (H) 127 (HH)   (HH): Data is critically high  (H): Data is abnormally high    Radiology:    Chest x-ray   Reviewed  1. No acute cardiopulmonary

## 2024-08-19 NOTE — PROGRESS NOTES
Pt following commands, nodding/gesturing appropriately, pt nodded to being in pain, pt given PRN Fentanyl 25mcg.

## 2024-08-19 NOTE — PROGRESS NOTES
End Of Shift Note  [facility]  Summary of shift: Uneventful shift; no BM, passing gas frequently, BS hyperactive; family at bedside entire day and night shift; increase Lactulose to 6x/day; Lokelma 15mg and 10u insulin/D10 for K6.0, recheck 5.9, Dr. Bañuelos notified and new order to give 10u insulin/D10 tx x1 and recheck K 3 hours after, report if K+ >5.5; Ativan given x1 for vent compliance/agitation; Fentanyl x1 for pain, pt nodded to having pain, normally shakes his head no; MRI to be done tomorrow morning 0730 W/WO contrast.     Vitals:    Vitals:    08/19/24 1900 08/19/24 1915 08/19/24 1927 08/19/24 1930   BP: 130/69      Pulse: 73 71 77 71   Resp: 18 20 21 15   Temp:       TempSrc:       SpO2: 97% 97% 99% 99%   Weight:       Height:            I&O:   Intake/Output Summary (Last 24 hours) at 8/19/2024 1944  Last data filed at 8/19/2024 1702  Gross per 24 hour   Intake 1112.52 ml   Output 1025 ml   Net 87.52 ml       Resp Status: Clear/diminished BLL, Cpap most this shift.    Ventilator Settings:  Vent Mode: AC/PRVC Resp Rate (Set): 14 bpm/Vt (Set, mL): 550 mL/ /FiO2 : 40 %    Critical Care IV infusions:   dexmedeTOMIDine (PRECEDEX) 400 mcg in sodium chloride 0.9 % 100 mL infusion Stopped (08/19/24 0204)    [Held by provider] sodium chloride Stopped (08/13/24 1139)    dextrose      sodium chloride 10 mL/hr at 08/11/24 0628    midazolam Stopped (08/16/24 0700)    norepinephrine Stopped (08/16/24 0700)        LDA:   PICC 08/10/24 Left Brachial (Active)   Number of days: 9       NG/OG/NJ/NE Tube Orogastric Center mouth (Active)   Number of days: 9       Urinary Catheter 08/10/24 Roca (Active)   Number of days: 9       ETT  (Active)   Number of days: 9

## 2024-08-19 NOTE — PLAN OF CARE
Problem: Discharge Planning  Goal: Discharge to home or other facility with appropriate resources  Outcome: Progressing  Flowsheets (Taken 8/19/2024 0700)  Discharge to home or other facility with appropriate resources: Identify barriers to discharge with patient and caregiver     Problem: Safety - Medical Restraint  Goal: Remains free of injury from restraints (Restraint for Interference with Medical Device)  Description: INTERVENTIONS:  1. Determine that other, less restrictive measures have been tried or would not be effective before applying the restraint  2. Evaluate the patient's condition at the time of restraint application  3. Inform patient/family regarding the reason for restraint  4. Q2H: Monitor safety, psychosocial status, comfort, nutrition and hydration  Outcome: Progressing  Flowsheets  Taken 8/19/2024 1753 by Angelica Hodgson RN  Remains free of injury from restraints (restraint for interference with medical device): Determine that other, less restrictive measures have been tried or would not be effective before applying the restraint  Taken 8/19/2024 1600 by Angelica Hodgson RN  Remains free of injury from restraints (restraint for interference with medical device): Determine that other, less restrictive measures have been tried or would not be effective before applying the restraint  Taken 8/19/2024 1400 by Angelica Hodgson RN  Remains free of injury from restraints (restraint for interference with medical device): Determine that other, less restrictive measures have been tried or would not be effective before applying the restraint  Taken 8/19/2024 1200 by Angelica Hodgson RN  Remains free of injury from restraints (restraint for interference with medical device): Determine that other, less restrictive measures have been tried or would not be effective before applying the restraint  Taken 8/19/2024 1000 by Angelica Hodgson RN  Remains free of injury from restraints (restraint

## 2024-08-19 NOTE — PLAN OF CARE
Problem: Respiratory - Adult  Goal: Achieves optimal ventilation and oxygenation  8/19/2024 1926 by Mireya Sánchez RCP  Outcome: Progressing     Problem: Respiratory - Adult  Goal: Able to breathe comfortably  8/19/2024 1926 by Mireya Sánchez RCP  Outcome: Progressing     Problem: Respiratory - Adult  Goal: Patient's breath sounds will be clear and equal  8/19/2024 1926 by Mireya Sánchez RCP  Outcome: Progressing     Problem: Respiratory - Adult  Goal: Adequate oxygenation  Description: Adequate oxygenation  8/19/2024 1926 by Mireya Sánchez RCP  Outcome: Progressing     Problem: Respiratory - Adult  Goal: Will be able to breathe spontaneously, without ventilator support  Description: Will be able to breathe spontaneously, without ventilator support  Outcome: Progressing

## 2024-08-19 NOTE — PROGRESS NOTES
72 yo male with mental status changes . Patient was admitted on August 10 from outside hospital with COPD exacerbation with CO2 retention needing intubation with mechanical ventilation  Patiet was on IV versed and fentanyl drip being obtunded with neurology asked to see for decrease responsiveness . Head CT with generalized atrophy with mild chronic periventricular small vessel ischemia . MRI of Head with generalized atrophy with chronic periventricular small vessel ischemia . EEG moderate diffuse slowing . NH3 has been elevated at 127 currently 120 placed on lactulose and xifaxan  . CSF analysis 2 WBC , 1 RBC , total protein 181 , glucose 79 . Sedation has been held becoming more responsive following some command placed back on IV precedex for restlessness . Significant medications lactulose., xifaxan , precedex Testing   Head CT with generalized atrophy with mild chronic periventricular small vessel ischemia . MRI of Hedal with generalized atrophy with chronic periventricular small vessel ischemia . EEG moderate diffuse slowing . NH3 has been eevatd at 127 currently 120 placed on lactulose . CSF analysis 2 WBC , 1 RBC , total protein 181 , glucose 79 .      Past Medical History:   Diagnosis Date    Arthritis     Cirrhosis (HCC)     Emphysema lung (HCC)     GI bleed     Hypertension     MI (myocardial infarction) (HCC)        History reviewed. No pertinent surgical history.    History reviewed. No pertinent family history.    Social History     Socioeconomic History    Marital status:      Spouse name: None    Number of children: None    Years of education: None    Highest education level: None   Tobacco Use    Smoking status: Former     Types: Cigarettes    Smokeless tobacco: Never   Vaping Use    Vaping status: Never Used   Substance and Sexual Activity    Alcohol use: Not Currently    Drug use: Never     Social Determinants of Health     Food Insecurity: No Food Insecurity (8/10/2024)    Hunger Vital Sign      Worried About Running Out of Food in the Last Year: Never true     Ran Out of Food in the Last Year: Never true   Transportation Needs: No Transportation Needs (8/10/2024)    PRAPARE - Transportation     Lack of Transportation (Medical): No     Lack of Transportation (Non-Medical): No    Received from [facility]   Housing Stability: Low Risk  (8/10/2024)    Housing Stability Vital Sign     Unable to Pay for Housing in the Last Year: No     Number of Times Moved in the Last Year: 0     Homeless in the Last Year: No       Current Facility-Administered Medications   Medication Dose Route Frequency Provider Last Rate Last Admin    dexmedeTOMIDine (PRECEDEX) 400 mcg in sodium chloride 0.9 % 100 mL infusion  0.1-1.5 mcg/kg/hr IntraVENous Continuous Sathya Toney MD   Stopped at 08/19/24 0204    rifAXIMin (XIFAXAN) tablet 400 mg  400 mg Per NG tube TID Ama Vargas APRN - CNP   400 mg at 08/19/24 0822    LORazepam (ATIVAN) injection 1 mg  1 mg IntraVENous Q4H PRN Sathya Toney MD        cetirizine (ZYRTEC) tablet 10 mg  10 mg Orogastric Daily Reza, Mohsin Mohammad, MD   10 mg at 08/19/24 0822    midodrine (PROAMATINE) tablet 5 mg  5 mg Orogastric TID WC Reza, Mohsin Mohammad, MD   5 mg at 08/19/24 0822    lactulose (CHRONULAC) 10 GM/15ML solution 30 g  30 g Orogastric 4 times per day Sathish Bañuelos MD   30 g at 08/19/24 0522    methylPREDNISolone sodium succ (SOLU-MEDROL) 40 mg in sterile water 1 mL injection  40 mg IntraVENous Q8H Sathish Bañuelos MD   40 mg at 08/19/24 0330    insulin lispro (HUMALOG,ADMELOG) injection vial 0-4 Units  0-4 Units SubCUTAneous Q6H Sathish Bañuelos MD   1 Units at 08/19/24 0012    [Held by provider] 0.9 % sodium chloride infusion   IntraVENous Continuous Jacob Sow APRN - CNP   Stopped at 08/13/24 1139    glucose chewable tablet 16 g  4 tablet Oral PRN Jacob Sow APRN - CNP        dextrose bolus 10% 125 mL  125 mL IntraVENous PRN Magi,  sensation  Cranial nerve 7 normal exam   Cranial nerve 8. Grossly intact hearing   Cranial nerve 9 and 10. Symmetric palate elevation   Cranial nerve 11 , 5 out of 5 strength   Cranial Nerve 12 midline tongue . No atrophy  Sensation . Normal pinprick and light touch   Deep Tendon Reflexes normal  Plantar response flexor bilaterally    Assessment :    Toxic metabolic encephalopathy . Respiratory failure . Hyperammonemia     Plan:    Try to reduce sedation

## 2024-08-19 NOTE — PLAN OF CARE
Problem: Discharge Planning  Goal: Discharge to home or other facility with appropriate resources  8/18/2024 2027 by Leila Rooney RN  Outcome: Progressing  8/18/2024 1909 by Angelica Hodgson RN  Outcome: Progressing  Flowsheets (Taken 8/18/2024 0800)  Discharge to home or other facility with appropriate resources: Arrange for needed discharge resources and transportation as appropriate     Problem: Safety - Medical Restraint  Goal: Remains free of injury from restraints (Restraint for Interference with Medical Device)  Description: INTERVENTIONS:  1. Determine that other, less restrictive measures have been tried or would not be effective before applying the restraint  2. Evaluate the patient's condition at the time of restraint application  3. Inform patient/family regarding the reason for restraint  4. Q2H: Monitor safety, psychosocial status, comfort, nutrition and hydration  8/18/2024 2027 by Leila Rooney RN  Outcome: Progressing  8/18/2024 1909 by Angelica Hodgson RN  Outcome: Progressing  Flowsheets  Taken 8/18/2024 1811  Remains free of injury from restraints (restraint for interference with medical device): Determine that other, less restrictive measures have been tried or would not be effective before applying the restraint  Taken 8/18/2024 1800  Remains free of injury from restraints (restraint for interference with medical device): Determine that other, less restrictive measures have been tried or would not be effective before applying the restraint  Taken 8/18/2024 1600  Remains free of injury from restraints (restraint for interference with medical device): Determine that other, less restrictive measures have been tried or would not be effective before applying the restraint  Taken 8/18/2024 1400  Remains free of injury from restraints (restraint for interference with medical device): Determine that other, less restrictive measures have been tried or would not be effective before applying  Progressing  8/18/2024 0832 by Kathy Bales RCP  Outcome: Progressing  Goal: Will be able to breathe spontaneously, without ventilator support  Description: Will be able to breathe spontaneously, without ventilator support  8/18/2024 1908 by Ana Lilia Serrato RCP  Outcome: Progressing  8/18/2024 0832 by Kathy Bales RCP  Outcome: Progressing     Problem: Skin/Tissue Integrity  Goal: Absence of new skin breakdown  Description: 1.  Monitor for areas of redness and/or skin breakdown  2.  Assess vascular access sites hourly  3.  Every 4-6 hours minimum:  Change oxygen saturation probe site  4.  Every 4-6 hours:  If on nasal continuous positive airway pressure, respiratory therapy assess nares and determine need for appliance change or resting period.  8/18/2024 2027 by Leila Rooney RN  Outcome: Progressing  8/18/2024 1909 by Angelica Hodgson RN  Outcome: Progressing     Problem: ABCDS Injury Assessment  Goal: Absence of physical injury  8/18/2024 2027 by Leila Rooney RN  Outcome: Progressing  8/18/2024 1909 by Angelica Hodgson RN  Outcome: Progressing  Flowsheets (Taken 8/18/2024 1500)  Absence of Physical Injury: Implement safety measures based on patient assessment     Problem: Pain  Goal: Verbalizes/displays adequate comfort level or baseline comfort level  8/18/2024 2027 by Leila Rooney RN  Outcome: Progressing  8/18/2024 1909 by Angelica Hodgson RN  Outcome: Progressing  Flowsheets  Taken 8/18/2024 1600  Verbalizes/displays adequate comfort level or baseline comfort level: Assess pain using appropriate pain scale  Taken 8/18/2024 1200  Verbalizes/displays adequate comfort level or baseline comfort level: Assess pain using appropriate pain scale  Taken 8/18/2024 0800  Verbalizes/displays adequate comfort level or baseline comfort level: Assess pain using appropriate pain scale     Problem: Nutrition Deficit:  Goal: Optimize nutritional status  8/18/2024 2027 by Leila Rooney

## 2024-08-19 NOTE — PROGRESS NOTES
Progress note  [facility]    Adult Hospitalist      Name: Aidne Black  MRN: [id number]     Acct: [id number]  Room: [address] [place] [address]-    Admit Date: 8/9/2024  6:45 PM  PCP: Angelica Cruz MD    Primary Problem  Principal Problem:    COPD exacerbation (HCC)  Active Problems:    Hypoxia    Acute encephalopathy    Abnormal finding on MRI of brain    Elevated CSF protein  Resolved Problems:    * No resolved hospital problems. *        Assesment/ plan:       Acute COPD exacerbation  IV Solu-Medrol  Duo nebs   Respiratory pathogen panel negative  Sputum culture   S/p IV ceftriaxone and Zithromax  Consult pulmonology - appreciate recs    Acute hypoxic and hypercarbic respiratory failure   Secondary to above   Mechanical ventilation sedation as per Critical Care   MRSA PCR negative  Neurology consulted for acute encephalopathy  Tube feeds started   Plan extubation once ok w critical care   CPAP trial    Encephalopathy:  -MRI EEG done  - Discussed case with neuro - plans for LP. Discussed with family in conjunction with neuro. Questions answered. If required, will give platelets prior to procedure, per IR protocols  -Neurology following.  Physical restraints for one day  Check ammonia level  Lactulose    Shock, etiology unclear  Monitor blood pressure   Initially sammy and now levophed.   Midodrine tid  Infectious work up otherwise NTD.   IV fluids  Lactate 1.7  Procalcitonin 0.08  Blood culture NTD  Sputum culture    Hyperglycemia  - IV fluids switched to normal saline  - 400s-->122    Cirrhosis/thrombocytopenia  Patient has had history of esophageal varices  Patient has OG tube.  Monitor for bleeding  PPI twice a day  Monitor LFTs  Currently on lactulose and Xifaxan   Liver ultrasound - cirrhotic liver and hepatomegaly  GI consult - no egd planned.   AFP 2.1  H/H - 11.9  PPI BID  LFTs trending up.   T bili trending down    Sinus Bradycardia  - cardiology following. No evidence of High grade AV block or conduction  PORTABLE    Result Date: 8/10/2024  1. Endotracheal tube in satisfactory position. 2. No acute cardiopulmonary process.     CT HEAD WO CONTRAST    Result Date: 8/10/2024  No acute intracranial abnormality.     XR CHEST PORTABLE    Result Date: 8/9/2024  No acute cardiopulmonary disease.         All radiological studies reviewed                Code Status:  DNR-CCA    Electronically signed by Mamadou Lagunas MD on 8/18/2024 at 8:18 PM     Copy sent to Angelica Jaimes MD    This note was created with the assistance of a speech-recognition program.  Although the intention is to generate a document that actually reflects the content of the visit, no guarantees can be provided that every mistake has been identified and corrected by editing.     Note was updated later by me after  physical examination and  completion of the assessment.

## 2024-08-19 NOTE — PLAN OF CARE
Problem: Respiratory - Adult  Goal: Achieves optimal ventilation and oxygenation  8/19/2024 0727 by Marti Mccollum RCP  Outcome: Progressing     Problem: Respiratory - Adult  Goal: Able to breathe comfortably  8/19/2024 0727 by Marti Mccollum RCP  Outcome: Progressing     Problem: Respiratory - Adult  Goal: Patient's breath sounds will be clear and equal  8/19/2024 0727 by Marti Mccollum RCP  Outcome: Progressing     Problem: Respiratory - Adult  Goal: Adequate oxygenation  Description: Adequate oxygenation  8/19/2024 0727 by Marti Mccollum RCP  Outcome: Progressing     Problem: Respiratory - Adult  Goal: Will be able to breathe spontaneously, without ventilator support  Description: Will be able to breathe spontaneously, without ventilator support  8/18/2024 1908 by Ana Lilia Serrato RCP  Outcome: Progressing

## 2024-08-19 NOTE — PROGRESS NOTES
Beginning of shift, OG 75, pt moving head back and forth frequently, OG is now at 60@ the teeth; tube feed stopped; notified Dr. Petit; new order to obtain CXR to check placement.

## 2024-08-19 NOTE — PROGRESS NOTES
K+ recheck 5.9, notified Dr. Bañuelos, new order Lasix 40mg IV, repeat D10W tx, repeat K+ 3 hrs after and call if above 5.5.

## 2024-08-20 ENCOUNTER — APPOINTMENT (OUTPATIENT)
Dept: GENERAL RADIOLOGY | Age: 74
End: 2024-08-20
Payer: MEDICARE

## 2024-08-20 ENCOUNTER — APPOINTMENT (OUTPATIENT)
Dept: MRI IMAGING | Age: 74
End: 2024-08-20
Payer: MEDICARE

## 2024-08-20 LAB
ACE CSF-CCNC: 3.9 U/L (ref 0–2.5)
ALBUMIN SERPL-MCNC: 2.7 G/DL (ref 3.5–5.2)
ALP SERPL-CCNC: 102 U/L (ref 40–129)
ALT SERPL-CCNC: 127 U/L (ref 5–41)
AMMONIA PLAS-SCNC: 125 UMOL/L (ref 16–60)
ANION GAP SERPL CALCULATED.3IONS-SCNC: 6 MMOL/L (ref 9–17)
AST SERPL-CCNC: 69 U/L
BASOPHILS # BLD: <0.03 K/UL (ref 0–0.2)
BASOPHILS NFR BLD: 0 % (ref 0–2)
BILIRUB SERPL-MCNC: 2 MG/DL (ref 0.3–1.2)
BUN SERPL-MCNC: 63 MG/DL (ref 8–23)
BUN/CREAT SERPL: 57 (ref 9–20)
CALCIUM SERPL-MCNC: 8.5 MG/DL (ref 8.6–10.4)
CHLORIDE SERPL-SCNC: 105 MMOL/L (ref 98–107)
CO2 SERPL-SCNC: 31 MMOL/L (ref 20–31)
CREAT SERPL-MCNC: 1.1 MG/DL (ref 0.7–1.2)
EOSINOPHIL # BLD: <0.03 K/UL (ref 0–0.44)
EOSINOPHILS RELATIVE PERCENT: 0 % (ref 1–4)
ERYTHROCYTE [DISTWIDTH] IN BLOOD BY AUTOMATED COUNT: 14.8 % (ref 11.8–14.4)
GFR, ESTIMATED: 70 ML/MIN/1.73M2
GLUCOSE BLD-MCNC: 158 MG/DL (ref 75–110)
GLUCOSE BLD-MCNC: 178 MG/DL (ref 75–110)
GLUCOSE BLD-MCNC: 184 MG/DL (ref 75–110)
GLUCOSE BLD-MCNC: 209 MG/DL (ref 75–110)
GLUCOSE SERPL-MCNC: 188 MG/DL (ref 70–99)
HCT VFR BLD AUTO: 37.2 % (ref 40.7–50.3)
HGB BLD-MCNC: 12.3 G/DL (ref 13–17)
IMM GRANULOCYTES # BLD AUTO: 0.16 K/UL (ref 0–0.3)
IMM GRANULOCYTES NFR BLD: 2 %
LYMPHOCYTES NFR BLD: 0.8 K/UL (ref 1.1–3.7)
LYMPHOCYTES RELATIVE PERCENT: 8 % (ref 24–43)
MCH RBC QN AUTO: 35.2 PG (ref 25.2–33.5)
MCHC RBC AUTO-ENTMCNC: 33.1 G/DL (ref 28.4–34.8)
MCV RBC AUTO: 106.6 FL (ref 82.6–102.9)
MONOCYTES NFR BLD: 0.65 K/UL (ref 0.1–1.2)
MONOCYTES NFR BLD: 6 % (ref 3–12)
NEUTROPHILS NFR BLD: 85 % (ref 36–65)
NEUTS SEG NFR BLD: 9.06 K/UL (ref 1.5–8.1)
NRBC BLD-RTO: 0.2 PER 100 WBC
PLATELET # BLD AUTO: 52 K/UL (ref 138–453)
PMV BLD AUTO: 11.4 FL (ref 8.1–13.5)
POTASSIUM SERPL-SCNC: 5.6 MMOL/L (ref 3.7–5.3)
PROT SERPL-MCNC: 4.6 G/DL (ref 6.4–8.3)
RBC # BLD AUTO: 3.49 M/UL (ref 4.21–5.77)
RBC # BLD: ABNORMAL 10*6/UL
RBC # BLD: ABNORMAL 10*6/UL
SODIUM SERPL-SCNC: 142 MMOL/L (ref 135–144)
VDRL CSF QL: NONREACTIVE
WBC OTHER # BLD: 10.7 K/UL (ref 3.5–11.3)

## 2024-08-20 PROCEDURE — 94761 N-INVAS EAR/PLS OXIMETRY MLT: CPT

## 2024-08-20 PROCEDURE — 99233 SBSQ HOSP IP/OBS HIGH 50: CPT | Performed by: PSYCHIATRY & NEUROLOGY

## 2024-08-20 PROCEDURE — 2580000003 HC RX 258: Performed by: INTERNAL MEDICINE

## 2024-08-20 PROCEDURE — 70553 MRI BRAIN STEM W/O & W/DYE: CPT

## 2024-08-20 PROCEDURE — 6370000000 HC RX 637 (ALT 250 FOR IP): Performed by: FAMILY MEDICINE

## 2024-08-20 PROCEDURE — 2000000000 HC ICU R&B

## 2024-08-20 PROCEDURE — 6360000002 HC RX W HCPCS: Performed by: INTERNAL MEDICINE

## 2024-08-20 PROCEDURE — A9579 GAD-BASE MR CONTRAST NOS,1ML: HCPCS | Performed by: PSYCHIATRY & NEUROLOGY

## 2024-08-20 PROCEDURE — 6370000000 HC RX 637 (ALT 250 FOR IP): Performed by: INTERNAL MEDICINE

## 2024-08-20 PROCEDURE — 80053 COMPREHEN METABOLIC PANEL: CPT

## 2024-08-20 PROCEDURE — 2580000003 HC RX 258: Performed by: NURSE PRACTITIONER

## 2024-08-20 PROCEDURE — 36415 COLL VENOUS BLD VENIPUNCTURE: CPT

## 2024-08-20 PROCEDURE — 2700000000 HC OXYGEN THERAPY PER DAY

## 2024-08-20 PROCEDURE — 82140 ASSAY OF AMMONIA: CPT

## 2024-08-20 PROCEDURE — 94003 VENT MGMT INPAT SUBQ DAY: CPT

## 2024-08-20 PROCEDURE — 82947 ASSAY GLUCOSE BLOOD QUANT: CPT

## 2024-08-20 PROCEDURE — 6360000004 HC RX CONTRAST MEDICATION: Performed by: PSYCHIATRY & NEUROLOGY

## 2024-08-20 PROCEDURE — 85025 COMPLETE CBC W/AUTO DIFF WBC: CPT

## 2024-08-20 PROCEDURE — 6370000000 HC RX 637 (ALT 250 FOR IP): Performed by: NURSE PRACTITIONER

## 2024-08-20 PROCEDURE — 94640 AIRWAY INHALATION TREATMENT: CPT

## 2024-08-20 PROCEDURE — 71045 X-RAY EXAM CHEST 1 VIEW: CPT

## 2024-08-20 RX ADMIN — IPRATROPIUM BROMIDE AND ALBUTEROL SULFATE 1 DOSE: 2.5; .5 SOLUTION RESPIRATORY (INHALATION) at 14:03

## 2024-08-20 RX ADMIN — DOCUSATE SODIUM 100 MG: 50 LIQUID ORAL at 09:20

## 2024-08-20 RX ADMIN — MIDODRINE HYDROCHLORIDE 5 MG: 5 TABLET ORAL at 09:20

## 2024-08-20 RX ADMIN — DEXTROSE MONOHYDRATE 250 ML: 100 INJECTION, SOLUTION INTRAVENOUS at 00:17

## 2024-08-20 RX ADMIN — SODIUM CHLORIDE, PRESERVATIVE FREE 10 ML: 5 INJECTION INTRAVENOUS at 09:21

## 2024-08-20 RX ADMIN — LACTULOSE 30 G: 20 SOLUTION ORAL at 14:29

## 2024-08-20 RX ADMIN — RIFAXIMIN 400 MG: 200 TABLET ORAL at 09:20

## 2024-08-20 RX ADMIN — BUDESONIDE 500 MCG: 0.5 SUSPENSION RESPIRATORY (INHALATION) at 20:00

## 2024-08-20 RX ADMIN — SODIUM CHLORIDE, PRESERVATIVE FREE 40 MG: 5 INJECTION INTRAVENOUS at 20:44

## 2024-08-20 RX ADMIN — LACTULOSE 30 G: 20 SOLUTION ORAL at 20:44

## 2024-08-20 RX ADMIN — CETIRIZINE HYDROCHLORIDE 10 MG: 10 TABLET, FILM COATED ORAL at 09:20

## 2024-08-20 RX ADMIN — LORAZEPAM 1 MG: 2 INJECTION INTRAMUSCULAR; INTRAVENOUS at 07:13

## 2024-08-20 RX ADMIN — FENTANYL CITRATE 25 MCG: 0.05 INJECTION, SOLUTION INTRAMUSCULAR; INTRAVENOUS at 18:45

## 2024-08-20 RX ADMIN — RIFAXIMIN 400 MG: 200 TABLET ORAL at 14:29

## 2024-08-20 RX ADMIN — RIFAXIMIN 400 MG: 200 TABLET ORAL at 20:44

## 2024-08-20 RX ADMIN — LACTULOSE 30 G: 20 SOLUTION ORAL at 09:20

## 2024-08-20 RX ADMIN — IPRATROPIUM BROMIDE AND ALBUTEROL SULFATE 1 DOSE: 2.5; .5 SOLUTION RESPIRATORY (INHALATION) at 05:56

## 2024-08-20 RX ADMIN — GADOTERIDOL 20 ML: 279.3 INJECTION, SOLUTION INTRAVENOUS at 08:21

## 2024-08-20 RX ADMIN — LACTULOSE 30 G: 20 SOLUTION ORAL at 18:20

## 2024-08-20 RX ADMIN — ARFORMOTEROL TARTRATE 15 MCG: 15 SOLUTION RESPIRATORY (INHALATION) at 20:00

## 2024-08-20 RX ADMIN — LORAZEPAM 1 MG: 2 INJECTION INTRAMUSCULAR; INTRAVENOUS at 23:40

## 2024-08-20 RX ADMIN — IPRATROPIUM BROMIDE AND ALBUTEROL SULFATE 1 DOSE: 2.5; .5 SOLUTION RESPIRATORY (INHALATION) at 20:00

## 2024-08-20 RX ADMIN — SODIUM CHLORIDE, PRESERVATIVE FREE 10 ML: 5 INJECTION INTRAVENOUS at 20:40

## 2024-08-20 RX ADMIN — MIDODRINE HYDROCHLORIDE 5 MG: 5 TABLET ORAL at 14:29

## 2024-08-20 RX ADMIN — ARFORMOTEROL TARTRATE 15 MCG: 15 SOLUTION RESPIRATORY (INHALATION) at 05:58

## 2024-08-20 RX ADMIN — FENTANYL CITRATE 25 MCG: 0.05 INJECTION, SOLUTION INTRAMUSCULAR; INTRAVENOUS at 07:45

## 2024-08-20 RX ADMIN — WATER 40 MG: 1 INJECTION INTRAMUSCULAR; INTRAVENOUS; SUBCUTANEOUS at 03:42

## 2024-08-20 RX ADMIN — MIDODRINE HYDROCHLORIDE 5 MG: 5 TABLET ORAL at 18:20

## 2024-08-20 RX ADMIN — BUDESONIDE 500 MCG: 0.5 SUSPENSION RESPIRATORY (INHALATION) at 05:57

## 2024-08-20 RX ADMIN — IPRATROPIUM BROMIDE AND ALBUTEROL SULFATE 1 DOSE: 2.5; .5 SOLUTION RESPIRATORY (INHALATION) at 09:39

## 2024-08-20 RX ADMIN — DOCUSATE SODIUM 100 MG: 50 LIQUID ORAL at 20:44

## 2024-08-20 RX ADMIN — SODIUM ZIRCONIUM CYCLOSILICATE 5 G: 5 POWDER, FOR SUSPENSION ORAL at 00:19

## 2024-08-20 RX ADMIN — SODIUM ZIRCONIUM CYCLOSILICATE 5 G: 5 POWDER, FOR SUSPENSION ORAL at 06:05

## 2024-08-20 RX ADMIN — LACTULOSE 30 G: 20 SOLUTION ORAL at 03:46

## 2024-08-20 RX ADMIN — INSULIN HUMAN 10 UNITS: 100 INJECTION, SOLUTION PARENTERAL at 00:18

## 2024-08-20 RX ADMIN — WATER 40 MG: 1 INJECTION INTRAMUSCULAR; INTRAVENOUS; SUBCUTANEOUS at 18:37

## 2024-08-20 RX ADMIN — SODIUM CHLORIDE, PRESERVATIVE FREE 40 MG: 5 INJECTION INTRAVENOUS at 09:20

## 2024-08-20 RX ADMIN — FUROSEMIDE 40 MG: 10 INJECTION, SOLUTION INTRAMUSCULAR; INTRAVENOUS at 00:23

## 2024-08-20 ASSESSMENT — PAIN DESCRIPTION - PAIN TYPE: TYPE: OTHER (COMMENT)

## 2024-08-20 ASSESSMENT — PAIN SCALES - GENERAL
PAINLEVEL_OUTOF10: 0
PAINLEVEL_OUTOF10: 8
PAINLEVEL_OUTOF10: 0

## 2024-08-20 ASSESSMENT — PULMONARY FUNCTION TESTS
PIF_VALUE: 18
PIF_VALUE: 21
PIF_VALUE: 21
PIF_VALUE: 23
PIF_VALUE: 19
PIF_VALUE: 22

## 2024-08-20 NOTE — PROGRESS NOTES
Nutrition Assessment     Type and Reason for Visit: Reassess    Nutrition Recommendations/Plan:   NPO diet  Vital AF 1.2 Itz goal rate 65 mL/hr (1560 mL total volume) and water flush 30 mL every 4 hours  Monitor vent status, tube feeding rate, GI function and Labs  Consider changing tube feeding formula to Nepro goal rate 43 mL/hr (1032 mL total volume)     Malnutrition Assessment:  Malnutrition Status: At risk for malnutrition (Comment)    Nutrition Assessment:  Patient remains intubated with no sedation. Patient is lethargic and followed commands. Patient appears to have liver and kidney failure. Potassium levels this morning was 5.6 (H) and ammonia was 125 (H). Patient given Lokelma and D50 insulin for elevated potassium and Lactulose dose has been increased for elevated ammonia. Consider need to change tube feeding formula to Nepro. Current tube feeding, Vital AF 1.2 Itz is at goal rate 65 mL/hr and tolerated well. Monitor vent status, tube feeding rate, tolerance, GI function and labs.    Estimated Daily Nutrient Needs:  Energy (kcal):  9846-7778 kcal (17-18 kcal/kg) Weight Used for Energy Requirements: Current     Protein (g):  101-117 gm of protein (1.3-1.5 gm/kg) Weight Used for Protein Requirements: Ideal        Fluid (ml/day):  9086-7615 mL Method Used for Fluid Requirements: 1 ml/kcal    Nutrition Related Findings:   Edema: +3 pitting BUE, +2 pitting BLE. Active bowel sounds. Lactulose. Labs: K+: 5.6 (H), BUN/Cr: 63/1.1, Ammonia: 125 (H) Wound Type: None    Current Nutrition Therapies:    Diet NPO  ADULT TUBE FEEDING; Orogastric; Peptide Based; Continuous; 15; Yes; 15; Q 4 hours; 65; 30; Q 4 hours    Anthropometric Measures:  Height: 180.3 cm (5' 11\")  Current Body Wt: 106.3 kg (234 lb 5.6 oz)   BMI: 32.7    Nutrition Diagnosis:   Inadequate oral intake related to inadequate protein-energy intake, impaired respiratory function as evidenced by NPO or clear liquid status due to medical condition,

## 2024-08-20 NOTE — PROGRESS NOTES
Active problem Toxic metabolic encephalopathy . Respiratory failure . Hyperammonemia . The condition is ammonia is 125 today . He underwent FU MRI of Head showing generalized atrophy with chronic periventricular small vessel ischemia. He is obtunded post sedation for MRI not opening eyes grimacing to noxious stimulation withdrawing all limbs equally .He is off IV precedex .  Long discussion with family and critical care . Wife does not want to prolongue condition feeling that he would not have want to be on ventilator or in extended care facility considering withdrawal of care . 72 yo male with mental status changes . Patient was admitted on August 10 from outside hospital with COPD exacerbation with CO2 retention needing intubation with mechanical ventilation  Patiet was on IV versed and fentanyl drip being obtunded with neurology asked to see for decrease responsiveness . Head CT with generalized atrophy with mild chronic periventricular small vessel ischemia . MRI of Head with generalized atrophy with chronic periventricular small vessel ischemia . EEG moderate diffuse slowing . NH3 has been elevated at 127 placed on lactulose and xifaxan  . CSF analysis 2 WBC , 1 RBC , total protein 181 , glucose 79 . Sedation has been held becoming more responsive following some command placed back on IV precedex for restlessness . Significant medications lactulose., xifaxan , precedex Testing   Head CT with generalized atrophy with mild chronic periventricular small vessel ischemia . MRI of Hedal with generalized atrophy with chronic periventricular small vessel ischemia . EEG moderate diffuse slowing . NH3 has been eevatd at 127 currently 120 placed on lactulose . CSF analysis 2 WBC , 1 RBC , total protein 181 , glucose 79 . FU MRI of Head showing generalized atrophy with chronic periventricular small vessel ischemia.     Past Medical History:   Diagnosis Date    Arthritis     Cirrhosis (HCC)     Emphysema lung (HCC)     GI  (200 lb)    Neurological Examination on ventilator   Constitutional .General exam well groomed   Head/ Ears /Nose/Throat/external ear .Oral intubation   Neck and thyroid .Normal size. No bruits  Cardiovascular: Auscultation of heart with regular rate and rhythm   Musculoskeletal. Muscle bulk and tone normal                                                           Muscle strength withdrawing all limbs equally                                                                                No dysmetria or dysdiadokinesis  No tremor   Orientation obtunded grimacing to noxious stimulus . No visual threat . Not following commands   Attention and concentration reduced  Cranial nerve 2 normal acuety and visual fields  Cranial nerve 3, 4 and 6 .Extraocular muscles are intact . Pupils are equal and reactive   Cranial nerve 5 . Intact corneal reflex. Normal facial sensation  Cranial nerve 7 normal exam   Cranial nerve 8. Grossly intact hearing   Cranial nerve 9 and 10. Symmetric palate elevation   Cranial nerve 11 , 5 out of 5 strength   Cranial Nerve 12 midline tongue . No atrophy  Sensation . Withdrawing all limbs equally    Deep Tendon Reflexes normal  Plantar response flexor bilaterally    Assessment :    Toxic metabolic encephalopathy . Respiratory failure . Hyperammonemia     Plan:    Palliative care with possible withdrawal of care per family

## 2024-08-20 NOTE — PROGRESS NOTES
Contacted  d/t patient potassium 6, given orders to 40mg lasix IV, D10 250ml and 10 units of insulin, 5g Lokelma; recheck potassium in morning.    Hospital pt, results reviewed

## 2024-08-20 NOTE — PROGRESS NOTES
Pulmonary Critical Care Progress Note    Patient seen for the follow up of COPD exacerbation (HCC)     Subjective:    He had increased potassium yesterday and 2 more rechecks I was contacted on 2 occasions ordered Lokelma with D50 insulin and also gave IV Lasix.  He is tolerated CPAP 8 cm H2O 30% FiO2 today pressure support of 10.  He had MRI of the brain done received Ativan 1 mg and 25 fentanyl 4.  He is still lethargic but responsive opens eyes and follow commands.  He was on full vent support FiO2 30% tidal volume 550 rate of 18 PEEP of 8.  He tolerates tube feeds   .  He had no significant BM.  I started Colace    Examination:    Vitals: /88   Pulse 76   Temp 97.6 °F (36.4 °C) (Temporal)   Resp 25   Ht 1.803 m (5' 11\")   Wt 106.3 kg (234 lb 5.6 oz)   SpO2 97%   BMI 32.69 kg/m²   SpO2  Av.3 %  Min: 95 %  Max: 100 %  General appearance: Intubated lethargic eyes open  Neck: No JVD  Lungs: Decreased breath sound no crackles or wheeze labored  Heart: regular rate and rhythm, S1, S2 normal, no gallop  Abdomen: Soft, non tender, + BS  Extremities: no cyanosis or clubbing. No significant edema    LABs:    CBC:   Recent Labs     24  0420   WBC 7.2 10.7   HGB 12.1* 12.3*   HCT 36.2* 37.2*   PLT See Reflexed IPF Result 52*     BMP:   Recent Labs     24  1737 24  2250 24  0420     --  142   K 5.9* 6.0* 5.6*   CO2 27  --  31   BUN 54*  --  63*   CREATININE 1.0  --  1.1   LABGLOM 78  --  70   GLUCOSE 145*  --  188*         LIVER PROFILE:  Recent Labs     24  0420   AST 56* 69*   * 127*      Latest Reference Range & Units 24 04:29   POC HCO3 21.0 - 28.0 mmol/L 21.7   POC O2 SAT 94.0 - 98.0 % 98.2 (H)   POC pCO2 35.0 - 48.0 mm Hg 36.7   POC pH 7.350 - 7.450  7.379   POC PO2 83.0 - 108.0 mm Hg 110.0 (H)   (H): Data is abnormally high     Latest Reference Range & Units 24 12:55 24 03:43 08/15/24 09:00 24 04:12    Ammonia 16 - 60 umol/L 62 (H) 58 92 (H) 127 (HH)   (HH): Data is critically high  (H): Data is abnormally high    Radiology:    Chest x-ray 8/20  Reviewed  1. No acute cardiopulmonary process.  2. Support tubes and lines as described above.     MRI brain  1. Patient motion limits evaluation.  2. Questionable areas of incomplete suppression of the T2 FLAIR signal along  the sulci of the cerebral hemispheres bilaterally.  This appears not as  severe as on the prior exam.  Again this may be artifactual in nature versus  proteinaceous fluid or subarachnoid hemorrhage.  However, no evidence of  leptomeningeal enhancement.  3. Mild global parenchymal volume loss with mild-to-moderate chronic  microvascular ischemic changes.  4. No convincing abnormal enhancement within the brain.  5. Bilateral mastoid effusions.    Echocardiogram    Left Ventricle: Normal left ventricular systolic function with a visually estimated EF of 50 - 55%. Left ventricle size is normal. Normal wall thickness. Mild hypokinesis of the following segments: mid inferoseptal. Abnormal diastolic function.    Aortic Valve: Trileaflet valve.    Mitral Valve: Mildly thickened leaflets. Mild regurgitation.    Image quality is fair.       Impression/recommendations;    Acute hypoxic and hypercarbic respiratory failure requiring intubation  Continue assist-control ventilation FiO2 30% PEEP of 8 respiratory 18 tidal volume 550  CPAP trial as tolerated/resume vent support at night; not able to extubate given significant lethargy     Monitor off Versed drip  Versed 2 mg IV as needed every hour  Fentanyl 25 every hour as needed for pain  Sedation vacation  Tube feeds as tolerated     COPD exacerbation  DuoNeb by nebulizer every 4 hours  Pulmicort 0.5 twice daily by nebulizer  Brovana by nebulizer twice daily  Finished Rocephin/off Zithromax   Solu-Medrol 40 IV every 12 hours       Hypotension requiring pressors/bradycardia  Monitor blood pressure off

## 2024-08-20 NOTE — PLAN OF CARE
Problem: Discharge Planning  Goal: Discharge to home or other facility with appropriate resources  8/20/2024 0059 by Claire Arriaga, RN  Outcome: Progressing  8/19/2024 1942 by Angelica Hodgson, RN  Outcome: Progressing  Flowsheets (Taken 8/19/2024 0700)  Discharge to home or other facility with appropriate resources: Identify barriers to discharge with patient and caregiver     Problem: Safety - Medical Restraint  Goal: Remains free of injury from restraints (Restraint for Interference with Medical Device)  Description: INTERVENTIONS:  1. Determine that other, less restrictive measures have been tried or would not be effective before applying the restraint  2. Evaluate the patient's condition at the time of restraint application  3. Inform patient/family regarding the reason for restraint  4. Q2H: Monitor safety, psychosocial status, comfort, nutrition and hydration  8/20/2024 0059 by Claire Arriaga, RN  Outcome: Progressing  Flowsheets  Taken 8/20/2024 0000  Remains free of injury from restraints (restraint for interference with medical device):   Determine that other, less restrictive measures have been tried or would not be effective before applying the restraint   Inform patient/family regarding the reason for restraint   Evaluate the patient's condition at the time of restraint application   Every 2 hours: Monitor safety, psychosocial status, comfort, nutrition and hydration  Taken 8/19/2024 2200  Remains free of injury from restraints (restraint for interference with medical device):   Determine that other, less restrictive measures have been tried or would not be effective before applying the restraint   Evaluate the patient's condition at the time of restraint application   Inform patient/family regarding the reason for restraint   Every 2 hours: Monitor safety, psychosocial status, comfort, nutrition and hydration  Taken 8/19/2024 2000  Remains free of injury from restraints (restraint for  CARLOS Rangel  Outcome: Progressing  8/19/2024 1942 by Angelica Hodgson, RN  Outcome: Progressing  Flowsheets  Taken 8/19/2024 1600  Verbalizes/displays adequate comfort level or baseline comfort level: Assess pain using appropriate pain scale  Taken 8/19/2024 0800  Verbalizes/displays adequate comfort level or baseline comfort level: Assess pain using appropriate pain scale     Problem: Nutrition Deficit:  Goal: Optimize nutritional status  8/20/2024 0059 by Claire Arriaga RN  Outcome: Progressing  8/19/2024 1942 by Angelica Hodgson, RN  Outcome: Progressing

## 2024-08-20 NOTE — PROGRESS NOTES
Spiritual Health Assessment/Progress Note  [facility]    (P) Palliative Care,  ,  ,      Name: Aiden Black MRN: [id number]    Age: 73 y.o.     Sex: male   Language: English   Sabianist: [employer]   COPD exacerbation (HCC)     Date: 8/20/2024            Total Time Calculated: (P) 16 min              Spiritual Assessment continued in [facility] ICU        Referral/Consult From: (P) Palliative Care   Encounter Overview/Reason: (P) Palliative Care  Service Provided For: (P) Patient and family together  Patient and family have Church ana, well supported by their , and value prayer.  Ana, Belief, Meaning:   Patient identifies as spiritual, is connected with a ana tradition or spiritual practice, and has beliefs or practices that help with coping during difficult times  Family/Friends identify as spiritual, are connected with a ana tradition or spiritual practice, and have beliefs or practices that help with coping during difficult times      Importance and Influence:  Patient unable to communicate at this time  Family/Friends have spiritual/personal beliefs that influence decisions regarding the patient's health    Community:  Patient is connected with a spiritual community and feels well-supported. Support system includes: Spouse/Partner, Children, and Extended family  Family/Friends are connected with a spiritual community: and feel well-supported. Support system includes: Spouse/Partner and Children    Assessment and Plan of Care:     Patient Interventions include: Facilitated expression of thoughts and feelings and Affirmed coping skills/support systems  Family/Friends Interventions include: Explored spiritual coping/struggle/distress and theological reflection and Affirmed coping skills/support systems    Patient Plan of Care: Spiritual Care available upon further referral  Family/Friends Plan of Care: Spiritual Care available upon further referral    Electronically signed by Beck

## 2024-08-20 NOTE — PROGRESS NOTES
Pt taken to MRI W/WO contrast brain without incident, pt medicated with PRN Ativan and PRN Fentanyl for ventilator compliance and decrease agitation, pt tolerated well.

## 2024-08-20 NOTE — PLAN OF CARE
Problem: Discharge Planning  Goal: Discharge to home or other facility with appropriate resources  Outcome: Progressing  Flowsheets (Taken 8/20/2024 0700)  Discharge to home or other facility with appropriate resources: Identify barriers to discharge with patient and caregiver     Problem: Safety - Medical Restraint  Goal: Remains free of injury from restraints (Restraint for Interference with Medical Device)  Description: INTERVENTIONS:  1. Determine that other, less restrictive measures have been tried or would not be effective before applying the restraint  2. Evaluate the patient's condition at the time of restraint application  3. Inform patient/family regarding the reason for restraint  4. Q2H: Monitor safety, psychosocial status, comfort, nutrition and hydration  Outcome: Progressing  Flowsheets  Taken 8/20/2024 1739 by Angelica Hodgson RN  Remains free of injury from restraints (restraint for interference with medical device): Determine that other, less restrictive measures have been tried or would not be effective before applying the restraint  Taken 8/20/2024 1600 by Angelica Hodgson RN  Remains free of injury from restraints (restraint for interference with medical device): Determine that other, less restrictive measures have been tried or would not be effective before applying the restraint  Taken 8/20/2024 1400 by Angelica Hodgson RN  Remains free of injury from restraints (restraint for interference with medical device): Determine that other, less restrictive measures have been tried or would not be effective before applying the restraint  Taken 8/20/2024 1200 by Angelica Hodgson RN  Remains free of injury from restraints (restraint for interference with medical device): Determine that other, less restrictive measures have been tried or would not be effective before applying the restraint  Taken 8/20/2024 1000 by Angelica Hodgson RN  Remains free of injury from restraints (restraint  indicated  Goal: Able to breathe comfortably  8/20/2024 0941 by Verenice Montoya RCP  Outcome: Progressing  Goal: Patient's breath sounds will be clear and equal  8/20/2024 0941 by Verenice Montoya RCP  Outcome: Progressing  Goal: Adequate oxygenation  Description: Adequate oxygenation  8/20/2024 0941 by Verenice Montoya RCP  Outcome: Progressing  Goal: Will be able to breathe spontaneously, without ventilator support  Description: Will be able to breathe spontaneously, without ventilator support  8/20/2024 0941 by Verenice Montoya RCP  Outcome: Progressing     Problem: Skin/Tissue Integrity  Goal: Absence of new skin breakdown  Description: 1.  Monitor for areas of redness and/or skin breakdown  2.  Assess vascular access sites hourly  3.  Every 4-6 hours minimum:  Change oxygen saturation probe site  4.  Every 4-6 hours:  If on nasal continuous positive airway pressure, respiratory therapy assess nares and determine need for appliance change or resting period.  Outcome: Progressing     Problem: ABCDS Injury Assessment  Goal: Absence of physical injury  Outcome: Progressing     Problem: Pain  Goal: Verbalizes/displays adequate comfort level or baseline comfort level  Outcome: Progressing  Flowsheets  Taken 8/20/2024 1500  Verbalizes/displays adequate comfort level or baseline comfort level: Assess pain using appropriate pain scale  Taken 8/20/2024 1200  Verbalizes/displays adequate comfort level or baseline comfort level: Assess pain using appropriate pain scale  Taken 8/20/2024 0700  Verbalizes/displays adequate comfort level or baseline comfort level: Assess pain using appropriate pain scale     Problem: Nutrition Deficit:  Goal: Optimize nutritional status  Outcome: Progressing  Flowsheets (Taken 8/20/2024 1419 by Iman Lrakin, RD, LD)  Nutrient intake appropriate for improving, restoring, or maintaining nutritional needs:   Assess nutritional status and recommend course of action   Recommend, monitor, and  adjust tube feedings and TPN/PPN based on assessed needs

## 2024-08-20 NOTE — PLAN OF CARE
Problem: Respiratory - Adult  Goal: Achieves optimal ventilation and oxygenation  8/20/2024 0941 by Verenice Montoya RCP  Outcome: Progressing  Flowsheets  Taken 8/20/2024 0556 by Mireya Sánchez RCP  Achieves optimal ventilation and oxygenation:   Assess for changes in respiratory status   Position to facilitate oxygenation and minimize respiratory effort   Assess the need for suctioning and aspirate as needed   Respiratory therapy support as indicated  Taken 8/20/2024 0344 by Mireya Sánchez RCP  Achieves optimal ventilation and oxygenation:   Assess for changes in respiratory status   Oxygen supplementation based on oxygen saturation or arterial blood gases   Assess the need for suctioning and aspirate as needed   Respiratory therapy support as indicated  8/20/2024 0059 by Claire Arriaga, RN  Outcome: Progressing  Flowsheets  Taken 8/19/2024 2359 by Mireya Sácnhez RCP  Achieves optimal ventilation and oxygenation:   Assess for changes in mentation and behavior   Oxygen supplementation based on oxygen saturation or arterial blood gases   Assess the need for suctioning and aspirate as needed   Respiratory therapy support as indicated  Taken 8/19/2024 2000 by Claire Arriaga, RN  Achieves optimal ventilation and oxygenation:   Assess for changes in respiratory status   Assess for changes in mentation and behavior   Position to facilitate oxygenation and minimize respiratory effort   Oxygen supplementation based on oxygen saturation or arterial blood gases   Initiate smoking cessation protocol as indicated  8/19/2024 1942 by Angelica Hodgson, RN  Outcome: Progressing  Flowsheets (Taken 8/19/2024 1927 by Mireya Sánchez RCP)  Achieves optimal ventilation and oxygenation:   Assess for changes in respiratory status   Oxygen supplementation based on oxygen saturation or arterial blood gases   Assess the need for suctioning and aspirate as needed   Respiratory therapy support as indicated  Goal: Able

## 2024-08-20 NOTE — PROGRESS NOTES
End Of Shift Note  [facility]  Summary of shift: pt taken to MRI for brain scan W W/O contrast, tolerated well with dose Ativan/Fentanyl; Dr. Harris in and spoke with family regarding MRI results; Dr. Bañuelos also in and family able to speak with both physicians at the same time; palliative care consulted to speak with family, will be in AM; no BM this shift despite Lactulose, Rifaximin, and passive ROM with legs and frequent turns, no distention, abdomen soft, BS hyperactive, and passing flatus; responsive to pain and opens eyes at random; able to nod head yes when I asked if he was in pain toward end of shift, medicated with PRN Fentanyl, pt no longer restless.    Vitals:    Vitals:    08/20/24 1830 08/20/24 1845 08/20/24 1900 08/20/24 1915   BP:   118/66    Pulse: 80 78 73 75   Resp: 21 18 16 17   Temp:       TempSrc:       SpO2: 98% 97% 97% 98%   Weight:       Height:            I&O:   Intake/Output Summary (Last 24 hours) at 8/20/2024 1928  Last data filed at 8/20/2024 1851  Gross per 24 hour   Intake 1865.12 ml   Output 2525 ml   Net -659.88 ml       Resp Status: Clear/diminished throughout, tolerating vent settings and CPAP trial.    Ventilator Settings:  Vent Mode: (S) AC/PRVC Resp Rate (Set): 14 bpm/Vt (Set, mL): 550 mL/ /FiO2 : 30 %    Critical Care IV infusions:   dexmedeTOMIDine (PRECEDEX) 400 mcg in sodium chloride 0.9 % 100 mL infusion Stopped (08/19/24 0204)    [Held by provider] sodium chloride Stopped (08/13/24 1139)    dextrose      sodium chloride 10 mL/hr at 08/11/24 0628    midazolam Stopped (08/16/24 0700)    norepinephrine Stopped (08/16/24 0700)        LDA:   PICC 08/10/24 Left Brachial (Active)   Number of days: 10       NG/OG/NJ/NE Tube Orogastric Center mouth (Active)   Number of days: 10       Urinary Catheter 08/10/24 Roca (Active)   Number of days: 10       ETT  (Active)   Number of days: 10

## 2024-08-20 NOTE — PROGRESS NOTES
End Of Shift Note  [facility]  Summary of shift: At start of shift patient received lasix 40mg, D10 125ml and 10 units insulin. At 2235 contacted  d/t patient K 6; Given orders for 40mg Lasix IV, D10 125ml and 10units insulin along with 5g lokelma x1. Restarted TF at 0000 d/t awaiting confirm placement of OG. At 0524 contacted  d/t patient potassium 5.6 and ammonia 125, along with no BM for shift; given orders for Colace 100 BID and Lokelma 5mg x1. Urine output for shift 1875.     Vitals:    Vitals:    08/20/24 0645 08/20/24 0700 08/20/24 0715 08/20/24 0730   BP:  (!) 107/56     Pulse: 73 77 74 79   Resp: 19 19 20 20   Temp:   97.8 °F (36.6 °C)    TempSrc:   Temporal    SpO2: 96% 96% 96% 95%   Weight:       Height:            I&O:   Intake/Output Summary (Last 24 hours) at 8/20/2024 0744  Last data filed at 8/20/2024 0508  Gross per 24 hour   Intake 957.12 ml   Output 2625 ml   Net -1667.88 ml       Resp Status: no changes to vent setting     Ventilator Settings:  Vent Mode: AC/PRVC Resp Rate (Set): 14 bpm/Vt (Set, mL): 550 mL/ /FiO2 : 30 %    Critical Care IV infusions:   dexmedeTOMIDine (PRECEDEX) 400 mcg in sodium chloride 0.9 % 100 mL infusion Stopped (08/19/24 0204)    [Held by provider] sodium chloride Stopped (08/13/24 1139)    dextrose      sodium chloride 10 mL/hr at 08/11/24 0628    midazolam Stopped (08/16/24 0700)    norepinephrine Stopped (08/16/24 0700)        LDA:   PICC 08/10/24 Left Brachial (Active)   Number of days: 9       NG/OG/NJ/NE Tube Orogastric Center mouth (Active)   Number of days: 9       Urinary Catheter 08/10/24 Roca (Active)   Number of days: 10       ETT  (Active)   Number of days: 10

## 2024-08-20 NOTE — PROGRESS NOTES
PROGRESS NOTE  [facility] Hospitalist        8/20/2024   1:13 PM    Name:  Aiden Black  MRN:    [id number]     Acct:     [id number]   Room:  [address] [place] [address] Day: 10     Admit Date: 8/9/2024  6:45 PM  PCP: Angelica Cruz MD    C/C:   Chief Complaint   Patient presents with    Shortness of Breath       Assessment/ Plan:          Patient is currently in ICU      Acute COPD exacerbation  IV Solu-Medrol  Duo nebs   Respiratory pathogen panel negative  Sputum culture   S/p IV ceftriaxone and Zithromax  Consult pulmonology - appreciate recs     Acute hypoxic and hypercarbic respiratory failure   Secondary to above   Mechanical ventilation sedation as per Critical Care   MRSA PCR negative  Neurology consulted for acute encephalopathy  Tube feeds started   Weaning trial and extubation as per critical care     Acute metabolic encephalopathy:  MRI and EEG unremarkable  Monitor mental status  Neurology following, plan for LP per neurology  Neurology recommending to decrease sedation     Shock, etiology unclear  Monitor blood pressure   Initially sammy and now levophed.   Midodrine tid  Infectious work up otherwise NTD.   IV fluids  Lactate 1.7  Procalcitonin 0.08  Blood culture NTD  Sputum culture     Hyperglycemia  - IV fluids switched to normal saline  - 400s-->122     Cirrhosis/thrombocytopenia  Patient has had history of esophageal varices/hyperammonemia  Patient has OG tube.  Monitor for bleeding  PPI twice a day  Monitor LFTs  Currently on lactulose and Xifaxan   Liver ultrasound - cirrhotic liver and hepatomegaly  GI consult - no egd planned.   AFP 2.1  PPI twice daily  Trend LFTs    Hyperkalemia  Lokelma as needed  Monitor potassium     Sinus Bradycardia  - cardiology following. No evidence of High grade AV block or conduction disease. Continue to monitor. Echo .      DVT and GI prophylaxis  Continue to monitor/telemetry/CBC with differential daily/BMP daily  Continue medications as  methylPREDNISolone sodium succ (SOLU-MEDROL) 40 mg in sterile water 1 mL injection, 40 mg, IntraVENous, Q12H, Sathish Bañuelos MD, 40 mg at 08/20/24 0342    [COMPLETED] insulin regular (HumuLIN R;NovoLIN R) injection 10 Units, 10 Units, IntraVENous, Once, 10 Units at 08/19/24 1605 **AND** dextrose bolus 10% 250 mL, 250 mL, IntraVENous, Once **AND** POCT Glucose, , , Q30 Min **AND** POCT Glucose, , , Q1H, Sathish Bañuelos MD    dexmedeTOMIDine (PRECEDEX) 400 mcg in sodium chloride 0.9 % 100 mL infusion, 0.1-1.5 mcg/kg/hr, IntraVENous, Continuous, Sathya Toney MD, Stopped at 08/19/24 0204    rifAXIMin (XIFAXAN) tablet 400 mg, 400 mg, Per NG tube, TID, Ama Vargas APRN - CNP, 400 mg at 08/20/24 0920    LORazepam (ATIVAN) injection 1 mg, 1 mg, IntraVENous, Q4H PRN, Sathya Toney MD, 1 mg at 08/20/24 0713    cetirizine (ZYRTEC) tablet 10 mg, 10 mg, Orogastric, Daily, Reza, Mohsin Mohammad, MD, 10 mg at 08/20/24 0920    midodrine (PROAMATINE) tablet 5 mg, 5 mg, Orogastric, TID WC, Reza, Mohsin Mohammad, MD, 5 mg at 08/20/24 0920    insulin lispro (HUMALOG,ADMELOG) injection vial 0-4 Units, 0-4 Units, SubCUTAneous, Q6H, Sathish Bañuelos MD, 1 Units at 08/19/24 0012    [Held by provider] 0.9 % sodium chloride infusion, , IntraVENous, Continuous, Jacob Sow APRN - CNP, Stopped at 08/13/24 1139    glucose chewable tablet 16 g, 4 tablet, Oral, PRN, Jacob Sow APRN - CNP    dextrose bolus 10% 125 mL, 125 mL, IntraVENous, PRN, Stopped at 08/19/24 1626 **OR** dextrose bolus 10% 250 mL, 250 mL, IntraVENous, PRN, Magi, Jacob, APRN - CNP    glucagon injection 1 mg, 1 mg, SubCUTAneous, PRN, Magi, Jacob, APRN - CNP    dextrose 10 % infusion, , IntraVENous, Continuous PRN, Magi, Jacob, APRN - CNP    potassium chloride 20 mEq/50 mL IVPB (Central Line), 20 mEq, IntraVENous, PRN, Stopped at 08/11/24 1555 **OR** potassium chloride 10 mEq/100 mL IVPB (Peripheral Line), 10 mEq, IntraVENous, PRN, Lump, Bailey A, APRN - CNP

## 2024-08-21 ENCOUNTER — APPOINTMENT (OUTPATIENT)
Dept: GENERAL RADIOLOGY | Age: 74
End: 2024-08-21
Payer: MEDICARE

## 2024-08-21 PROBLEM — Z51.5 PALLIATIVE CARE ENCOUNTER: Status: ACTIVE | Noted: 2024-08-21

## 2024-08-21 PROBLEM — Z71.89 ACP (ADVANCE CARE PLANNING): Status: ACTIVE | Noted: 2024-08-21

## 2024-08-21 LAB
ALBUMIN SERPL-MCNC: 2.8 G/DL (ref 3.5–5.2)
ALP SERPL-CCNC: 135 U/L (ref 40–129)
ALT SERPL-CCNC: 181 U/L (ref 5–41)
AMMONIA PLAS-SCNC: 203 UMOL/L (ref 16–60)
ANION GAP SERPL CALCULATED.3IONS-SCNC: 9 MMOL/L (ref 9–17)
AST SERPL-CCNC: 110 U/L
BASOPHILS # BLD: 0 K/UL (ref 0–0.2)
BASOPHILS NFR BLD: 0 % (ref 0–2)
BILIRUB SERPL-MCNC: 2.1 MG/DL (ref 0.3–1.2)
BUN SERPL-MCNC: 67 MG/DL (ref 8–23)
BUN/CREAT SERPL: 61 (ref 9–20)
CALCIUM SERPL-MCNC: 8.8 MG/DL (ref 8.6–10.4)
CHLORIDE SERPL-SCNC: 103 MMOL/L (ref 98–107)
CO2 SERPL-SCNC: 28 MMOL/L (ref 20–31)
CREAT SERPL-MCNC: 1.1 MG/DL (ref 0.7–1.2)
EKG ATRIAL RATE: 76 BPM
EKG P AXIS: -42 DEGREES
EKG P-R INTERVAL: 194 MS
EKG Q-T INTERVAL: 364 MS
EKG QRS DURATION: 74 MS
EKG QTC CALCULATION (BAZETT): 409 MS
EKG R AXIS: 82 DEGREES
EKG T AXIS: 88 DEGREES
EKG VENTRICULAR RATE: 76 BPM
EOSINOPHIL # BLD: 0 K/UL (ref 0–0.44)
EOSINOPHILS RELATIVE PERCENT: 0 % (ref 1–4)
ERYTHROCYTE [DISTWIDTH] IN BLOOD BY AUTOMATED COUNT: 15.5 % (ref 11.8–14.4)
GFR, ESTIMATED: 70 ML/MIN/1.73M2
GLUCOSE BLD-MCNC: 198 MG/DL (ref 75–110)
GLUCOSE SERPL-MCNC: 203 MG/DL (ref 70–99)
HCT VFR BLD AUTO: 37.7 % (ref 40.7–50.3)
HGB BLD-MCNC: 12.6 G/DL (ref 13–17)
IMM GRANULOCYTES # BLD AUTO: 0.26 K/UL (ref 0–0.3)
IMM GRANULOCYTES NFR BLD: 2 %
LYMPHOCYTES NFR BLD: 0.66 K/UL (ref 1.1–3.7)
LYMPHOCYTES RELATIVE PERCENT: 5 % (ref 24–43)
MCH RBC QN AUTO: 35.7 PG (ref 25.2–33.5)
MCHC RBC AUTO-ENTMCNC: 33.4 G/DL (ref 28.4–34.8)
MCV RBC AUTO: 106.8 FL (ref 82.6–102.9)
MONOCYTES NFR BLD: 0.79 K/UL (ref 0.1–1.2)
MONOCYTES NFR BLD: 6 % (ref 3–12)
NEUTROPHILS NFR BLD: 87 % (ref 36–65)
NEUTS SEG NFR BLD: 11.39 K/UL (ref 1.5–8.1)
NRBC BLD-RTO: 0 PER 100 WBC
PLATELET # BLD AUTO: 53 K/UL (ref 138–453)
PMV BLD AUTO: 11.3 FL (ref 8.1–13.5)
POTASSIUM SERPL-SCNC: 5.4 MMOL/L (ref 3.7–5.3)
PROT SERPL-MCNC: 4.8 G/DL (ref 6.4–8.3)
RBC # BLD AUTO: 3.53 M/UL (ref 4.21–5.77)
SODIUM SERPL-SCNC: 140 MMOL/L (ref 135–144)
WBC OTHER # BLD: 13.1 K/UL (ref 3.5–11.3)
WEST NILE IGG, CSF: 0.02 IV
WEST NILE IGM ANTIBODY CSF: 0 IV

## 2024-08-21 PROCEDURE — 6360000002 HC RX W HCPCS: Performed by: NURSE PRACTITIONER

## 2024-08-21 PROCEDURE — 94761 N-INVAS EAR/PLS OXIMETRY MLT: CPT

## 2024-08-21 PROCEDURE — 85025 COMPLETE CBC W/AUTO DIFF WBC: CPT

## 2024-08-21 PROCEDURE — 6360000002 HC RX W HCPCS: Performed by: INTERNAL MEDICINE

## 2024-08-21 PROCEDURE — 2700000000 HC OXYGEN THERAPY PER DAY

## 2024-08-21 PROCEDURE — 71045 X-RAY EXAM CHEST 1 VIEW: CPT

## 2024-08-21 PROCEDURE — 6370000000 HC RX 637 (ALT 250 FOR IP): Performed by: INTERNAL MEDICINE

## 2024-08-21 PROCEDURE — 99233 SBSQ HOSP IP/OBS HIGH 50: CPT | Performed by: NURSE PRACTITIONER

## 2024-08-21 PROCEDURE — 94640 AIRWAY INHALATION TREATMENT: CPT

## 2024-08-21 PROCEDURE — 36415 COLL VENOUS BLD VENIPUNCTURE: CPT

## 2024-08-21 PROCEDURE — 2580000003 HC RX 258: Performed by: NURSE PRACTITIONER

## 2024-08-21 PROCEDURE — 2060000000 HC ICU INTERMEDIATE R&B

## 2024-08-21 PROCEDURE — 80053 COMPREHEN METABOLIC PANEL: CPT

## 2024-08-21 PROCEDURE — 94003 VENT MGMT INPAT SUBQ DAY: CPT

## 2024-08-21 PROCEDURE — 2580000003 HC RX 258: Performed by: INTERNAL MEDICINE

## 2024-08-21 PROCEDURE — 82140 ASSAY OF AMMONIA: CPT

## 2024-08-21 PROCEDURE — G0316 PR PROLONG INPT EVAL ADD15 M: HCPCS | Performed by: NURSE PRACTITIONER

## 2024-08-21 PROCEDURE — 82947 ASSAY GLUCOSE BLOOD QUANT: CPT

## 2024-08-21 PROCEDURE — 99233 SBSQ HOSP IP/OBS HIGH 50: CPT | Performed by: PSYCHIATRY & NEUROLOGY

## 2024-08-21 RX ORDER — GLYCOPYRROLATE 0.2 MG/ML
0.2 INJECTION INTRAMUSCULAR; INTRAVENOUS EVERY 4 HOURS PRN
Status: DISCONTINUED | OUTPATIENT
Start: 2024-08-21 | End: 2024-08-22 | Stop reason: HOSPADM

## 2024-08-21 RX ORDER — HALOPERIDOL 5 MG/ML
1 INJECTION INTRAMUSCULAR
Status: DISCONTINUED | OUTPATIENT
Start: 2024-08-21 | End: 2024-08-22 | Stop reason: HOSPADM

## 2024-08-21 RX ORDER — HALOPERIDOL 5 MG/ML
INJECTION INTRAMUSCULAR
Status: DISPENSED
Start: 2024-08-21 | End: 2024-08-22

## 2024-08-21 RX ORDER — HYDROMORPHONE HYDROCHLORIDE 1 MG/ML
1 INJECTION, SOLUTION INTRAMUSCULAR; INTRAVENOUS; SUBCUTANEOUS
Status: DISCONTINUED | OUTPATIENT
Start: 2024-08-21 | End: 2024-08-22 | Stop reason: HOSPADM

## 2024-08-21 RX ORDER — FUROSEMIDE 10 MG/ML
20 INJECTION INTRAMUSCULAR; INTRAVENOUS ONCE
Status: COMPLETED | OUTPATIENT
Start: 2024-08-21 | End: 2024-08-21

## 2024-08-21 RX ADMIN — SODIUM CHLORIDE, PRESERVATIVE FREE 10 ML: 5 INJECTION INTRAVENOUS at 21:36

## 2024-08-21 RX ADMIN — LORAZEPAM 1 MG: 2 INJECTION INTRAMUSCULAR; INTRAVENOUS at 11:37

## 2024-08-21 RX ADMIN — HYDROMORPHONE HYDROCHLORIDE 1 MG: 1 INJECTION, SOLUTION INTRAMUSCULAR; INTRAVENOUS; SUBCUTANEOUS at 14:20

## 2024-08-21 RX ADMIN — LACTULOSE 30 G: 20 SOLUTION ORAL at 04:23

## 2024-08-21 RX ADMIN — LORAZEPAM 1 MG: 2 INJECTION INTRAMUSCULAR; INTRAVENOUS at 03:40

## 2024-08-21 RX ADMIN — ARFORMOTEROL TARTRATE 15 MCG: 15 SOLUTION RESPIRATORY (INHALATION) at 07:39

## 2024-08-21 RX ADMIN — BUDESONIDE 500 MCG: 0.5 SUSPENSION RESPIRATORY (INHALATION) at 07:39

## 2024-08-21 RX ADMIN — IPRATROPIUM BROMIDE AND ALBUTEROL SULFATE 1 DOSE: 2.5; .5 SOLUTION RESPIRATORY (INHALATION) at 11:05

## 2024-08-21 RX ADMIN — SODIUM CHLORIDE, PRESERVATIVE FREE 10 ML: 5 INJECTION INTRAVENOUS at 09:00

## 2024-08-21 RX ADMIN — GLYCOPYRROLATE 0.2 MG: 0.2 INJECTION INTRAMUSCULAR; INTRAVENOUS at 14:23

## 2024-08-21 RX ADMIN — WATER 40 MG: 1 INJECTION INTRAMUSCULAR; INTRAVENOUS; SUBCUTANEOUS at 04:23

## 2024-08-21 RX ADMIN — FUROSEMIDE 20 MG: 10 INJECTION, SOLUTION INTRAMUSCULAR; INTRAVENOUS at 06:16

## 2024-08-21 RX ADMIN — INSULIN LISPRO 1 UNITS: 100 INJECTION, SOLUTION INTRAVENOUS; SUBCUTANEOUS at 00:25

## 2024-08-21 RX ADMIN — IPRATROPIUM BROMIDE AND ALBUTEROL SULFATE 1 DOSE: 2.5; .5 SOLUTION RESPIRATORY (INHALATION) at 07:39

## 2024-08-21 RX ADMIN — LACTULOSE 30 G: 20 SOLUTION ORAL at 00:24

## 2024-08-21 ASSESSMENT — PULMONARY FUNCTION TESTS
PIF_VALUE: 26
PIF_VALUE: 21
PIF_VALUE: 28

## 2024-08-21 ASSESSMENT — PAIN SCALES - GENERAL
PAINLEVEL_OUTOF10: 0
PAINLEVEL_OUTOF10: 7

## 2024-08-21 ASSESSMENT — PAIN DESCRIPTION - LOCATION: LOCATION: GENERALIZED

## 2024-08-21 ASSESSMENT — PAIN DESCRIPTION - DESCRIPTORS: DESCRIPTORS: OTHER (COMMENT)

## 2024-08-21 ASSESSMENT — PAIN DESCRIPTION - ORIENTATION: ORIENTATION: OTHER (COMMENT)

## 2024-08-21 NOTE — PROGRESS NOTES
PROGRESS NOTE  [facility] Hospitalist        8/21/2024   2:09 PM    Name:  Aiden Black  MRN:    [id number]     Acct:     [id number]   Room:  [address] [place] [address] Day: 11     Admit Date: 8/9/2024  6:45 PM  PCP: Angelica Cruz MD    C/C:   Chief Complaint   Patient presents with    Shortness of Breath       Assessment/ Plan:          Patient is currently in ICU      Acute COPD exacerbation  IV Solu-Medrol  Duo nebs   Respiratory pathogen panel negative  Sputum culture   S/p IV ceftriaxone and Zithromax  Consult pulmonology - appreciate recs     Acute hypoxic and hypercarbic respiratory failure   Secondary to above   Mechanical ventilation sedation as per Critical Care   MRSA PCR negative  Neurology consulted for acute encephalopathy  Tube feeds started   Weaning trial and extubation as per critical care     Acute metabolic encephalopathy:  MRI and EEG unremarkable  Monitor mental status  Neurology following, plan for LP per neurology  Neurology recommending to decrease sedation     Shock, etiology unclear  Monitor blood pressure   Initially sammy and now levophed.   Midodrine tid  Infectious work up otherwise NTD.   IV fluids  Lactate 1.7  Procalcitonin 0.08  Blood culture NTD  Sputum culture     Hyperglycemia  - IV fluids switched to normal saline  - 400s-->122     Cirrhosis/thrombocytopenia  Patient has had history of esophageal varices/hyperammonemia  Patient has OG tube.  Monitor for bleeding  PPI twice a day  Monitor LFTs  Currently on lactulose and Xifaxan   Liver ultrasound - cirrhotic liver and hepatomegaly  GI consult - no egd planned.   AFP 2.1  PPI twice daily  Trend LFTs    Hyperkalemia  Lokelma as needed  Monitor potassium     Sinus Bradycardia  - cardiology following. No evidence of High grade AV block or conduction disease. Continue to monitor. Echo .      DVT and GI prophylaxis  Continue to monitor/telemetry/CBC with differential daily/BMP daily  Continue medications as  MD ALEX, Stopped at 08/11/24 0504      I/O (24Hr):    Intake/Output Summary (Last 24 hours) at 8/21/2024 1409  Last data filed at 8/21/2024 0851  Gross per 24 hour   Intake 1146 ml   Output 1500 ml   Net -354 ml       Data:           Labs:    Hematology:  Recent Labs     08/19/24  0347 08/20/24  0420 08/21/24  0403   WBC 7.2 10.7 13.1*   RBC 3.38* 3.49* 3.53*   HGB 12.1* 12.3* 12.6*   HCT 36.2* 37.2* 37.7*   .1* 106.6* 106.8*   MCH 35.8* 35.2* 35.7*   MCHC 33.4 33.1 33.4   RDW 14.8* 14.8* 15.5*   PLT See Reflexed IPF Result 52* 53*   MPV  --  11.4 11.3     Chemistry:  Recent Labs     08/19/24  1737 08/19/24  2250 08/20/24  0420 08/21/24  0403     --  142 140   K 5.9* 6.0* 5.6* 5.4*     --  105 103   CO2 27  --  31 28   GLUCOSE 145*  --  188* 203*   BUN 54*  --  63* 67*   CREATININE 1.0  --  1.1 1.1   ANIONGAP 7*  --  6* 9   LABGLOM 78  --  70 70   CALCIUM 8.6  --  8.5* 8.8     Recent Labs     08/19/24  0347 08/19/24  0532 08/19/24  2320 08/20/24  0420 08/20/24  0531 08/20/24  1155 08/20/24  1753 08/20/24  2312 08/21/24  0403 08/21/24  0509   AST 56*  --   --  69*  --   --   --   --  110*  --    *  --   --  127*  --   --   --   --  181*  --    ALKPHOS 136*  --   --  102  --   --   --   --  135*  --    BILITOT 1.5*  --   --  2.0*  --   --   --   --  2.1*  --    AMMONIA 120*  --   --  125*  --   --   --   --  203*  --    POCGLU  --    < > 144*  --  184* 178* 158* 209*  --  198*    < > = values in this interval not displayed.       Lab Results   Component Value Date/Time    SPECIAL LH 10 ML 08/10/2024 12:30 PM     Lab Results   Component Value Date/Time    CULTURE NO GROWTH 3 DAYS 08/16/2024 08:30 AM       Lab Results   Component Value Date/Time    POCPH 7.355 08/18/2024 05:11 AM    POCPCO2 52.7 08/18/2024 05:11 AM    POCPO2 75.5 08/18/2024 05:11 AM    POCHCO3 29.4 08/18/2024 05:11 AM    NBEA 1.6 08/15/2024 04:34 AM    PBEA 3.0 08/18/2024 05:11 AM    RVRH5DJQ 94.0 08/18/2024 05:11 AM    FIO2 40.0

## 2024-08-21 NOTE — PLAN OF CARE
Problem: Respiratory - Adult  Goal: Achieves optimal ventilation and oxygenation  8/21/2024 0744 by Marti Mccollum RCP  Outcome: Progressing     Problem: Respiratory - Adult  Goal: Able to breathe comfortably  8/21/2024 0744 by Marti Mccollum RCP  Outcome: Progressing     Problem: Respiratory - Adult  Goal: Patient's breath sounds will be clear and equal  8/21/2024 0744 by Marti Mccollum RCP  Outcome: Progressing     Problem: Respiratory - Adult  Goal: Adequate oxygenation  Description: Adequate oxygenation  8/21/2024 0744 by Marti Mccollum RCP  Outcome: Progressing     Problem: Respiratory - Adult  Goal: Will be able to breathe spontaneously, without ventilator support  Description: Will be able to breathe spontaneously, without ventilator support  8/20/2024 2002 by Sagar Loza RCP  Outcome: Progressing

## 2024-08-21 NOTE — PROGRESS NOTES
End Of Shift Note  [facility]  Summary of shift: Patient received ativan x2 for agitation and head thrashing. Patient had 3 liquid BM overnight. At 0517 contacted Dr. Bañuelos d/t patient ammonia 203 and Potassium 5.4; given orders for lasix 20mg IVPx1. U/O 550 for the shift. Wife Bedside.    Vitals:    Vitals:    08/21/24 0700 08/21/24 0715 08/21/24 0730 08/21/24 0739   BP: (!) 107/55      Pulse: 81 83 83 84   Resp: 21 23 23 23   Temp:       TempSrc:       SpO2: 98% 98% 97% 97%   Weight:       Height:            I&O:   Intake/Output Summary (Last 24 hours) at 8/21/2024 0742  Last data filed at 8/21/2024 0622  Gross per 24 hour   Intake 1266 ml   Output 1200 ml   Net 66 ml       Resp Status: No changes to vent setting    Ventilator Settings:  Vent Mode: AC/PRVC Resp Rate (Set): 14 bpm/Vt (Set, mL): 550 mL/ /FiO2 : 30 %    Critical Care IV infusions:   dexmedeTOMIDine (PRECEDEX) 400 mcg in sodium chloride 0.9 % 100 mL infusion Stopped (08/19/24 0204)    [Held by provider] sodium chloride Stopped (08/13/24 1139)    dextrose      sodium chloride 10 mL/hr at 08/11/24 0628    midazolam Stopped (08/16/24 0700)    norepinephrine Stopped (08/16/24 0700)        LDA:   PICC 08/10/24 Left Brachial (Active)   Number of days: 10       NG/OG/NJ/NE Tube Orogastric Center mouth (Active)   Number of days: 10       Urinary Catheter 08/10/24 Roca (Active)   Number of days: 11       ETT  (Active)   Number of days: 11

## 2024-08-21 NOTE — PROGRESS NOTES
End Of Shift Note  [facility]    Summary of shift: Patient had overall eventful shift. Family met with palliative care and decided to turn off tube feed and terminally weaning today after all family members have visited. Extubation occurred around 1420 and patient is now on 3L nasal cannula comfortably. Patient appears comfortable after receiving Dilaudid and Robinul. Family is at bedside. Plan for hospice consult and meeting tomorrow, 24 hours after extubation.    Vitals:    Vitals:    08/21/24 1815 08/21/24 1830 08/21/24 1845 08/21/24 1900   BP:       Pulse: 70 71 71 71   Resp: 21 21 21 21   Temp:       TempSrc:       SpO2:       Weight:       Height:            I&O:   Intake/Output Summary (Last 24 hours) at 8/21/2024 1914  Last data filed at 8/21/2024 1830  Gross per 24 hour   Intake 478 ml   Output 1175 ml   Net -697 ml       Resp Status: 3L Nasal Cannula        LDA:   PICC 08/10/24 Left Brachial (Active)   Number of days: 11       Urinary Catheter 08/10/24 Roca (Active)   Number of days: 11

## 2024-08-21 NOTE — PROGRESS NOTES
Spiritual Health Assessment/Progress Note  [facility]    (P) Palliative Care, Spiritual/Emotional Needs,  ,  ,      Name: Aiden Black MRN: [id number]    Age: 73 y.o.     Sex: male   Language: English   Yazdanism: Zoroastrian   COPD exacerbation (HCC)     Date: 8/21/2024            Total Time Calculated: (P) 13 min              Spiritual Assessment continued in [facility] ICU        Referral/Consult From: (P) Palliative Care   Encounter Overview/Reason: (P) Palliative Care, Spiritual/Emotional Needs  Service Provided For: (P) Patient and family together  Patient has just been extubated and spouse feels patient is now much more peaceful. Patient and spouse are surrounded by a large family.  Ana, Belief, Meaning:   Patient identifies as spiritual, is connected with a ana tradition or spiritual practice, and has beliefs or practices that help with coping during difficult times  Family/Friends identify as spiritual, are connected with a ana tradition or spiritual practice, and have beliefs or practices that help with coping during difficult times      Importance and Influence:  Patient has spiritual/personal beliefs that influence decisions regarding their health  Family/Friends have spiritual/personal beliefs that influence decisions regarding the patient's health    Community:  Patient is connected with a spiritual community and feels well-supported. Support system includes: Spouse/Partner, Children, Ana Community, and Extended family  Family/Friends are connected with a spiritual community:    Assessment and Plan of Care:     Patient Interventions include: Facilitated expression of thoughts and feelings and Affirmed coping skills/support systems  Family/Friends Interventions include: Affirmed coping skills/support systems    Patient Plan of Care: Spiritual Care available upon further referral  Family/Friends Plan of Care: Spiritual Care available upon further referral    Electronically signed by Beck

## 2024-08-21 NOTE — FLOWSHEET NOTE
08/21/24 0915   Treatment Team Notification   Reason for Communication Review case   Name of Team Member Notified Cat Jackson NP   Treatment Team Role Consulting Provider   Method of Communication Face to face   Response At bedside     Rounded with Cat at bedside. Family requested to meet in separate room and discuss terminal wean. Plan is for later today but family would like tube feedings to stop now and hold on any medications at this time.

## 2024-08-21 NOTE — PROGRESS NOTES
Active problem Toxic metabolic encephalopathy . Respiratory failure . Hyperammonemia . The condition is ammonia is 203 today . FU MRI of Head showing generalized atrophy with chronic periventricular small vessel ischemia. He is obtunded opening eyes grimacing to noxious stimulation withdrawing all limbs equally .He is off IV precedex .  Long discussion with family and critical care . Wife does not want to prolongue condition feeling that he would not have want to be on ventilator or in extended care facility choosing to proceed with withdrawal of care . 72 yo male with mental status changes . Patient was admitted on August 10 from outside hospital with COPD exacerbation with CO2 retention needing intubation with mechanical ventilation  Patiet was on IV versed and fentanyl drip being obtunded with neurology asked to see for decrease responsiveness . Head CT with generalized atrophy with mild chronic periventricular small vessel ischemia . MRI of Head with generalized atrophy with chronic periventricular small vessel ischemia . EEG moderate diffuse slowing . NH3 has been elevated at 127 placed on lactulose and xifaxan  . CSF analysis 2 WBC , 1 RBC , total protein 181 , glucose 79 . Sedation has been held becoming more responsive following some command placed back on IV precedex for restlessness . Significant medications lactulose., xifaxan , precedex Testing   Head CT with generalized atrophy with mild chronic periventricular small vessel ischemia . MRI of [place] with generalized atrophy with chronic periventricular small vessel ischemia . EEG moderate diffuse slowing . NH3 has been eevatd at 127 currently 120 placed on lactulose . CSF analysis 2 WBC , 1 RBC , total protein 181 , glucose 79 . FU MRI of Head showing generalized atrophy with chronic periventricular small vessel ischemia.     Past Medical History:   Diagnosis Date    Arthritis     Cirrhosis (HCC)     Emphysema lung (HCC)     GI bleed     Hypertension      infusion   IntraVENous PRN Lump, Bailey A, APRN - CNP 10 mL/hr at 08/11/24 0628 Rate Verify at 08/11/24 0628    magnesium sulfate 1000 mg in dextrose 5% 100 mL IVPB  1,000 mg IntraVENous PRN Lump, Bailey A, APRN - CNP        ondansetron (ZOFRAN-ODT) disintegrating tablet 4 mg  4 mg Oral Q8H PRN Lump, Bailey A, APRN - CNP        Or    ondansetron (ZOFRAN) injection 4 mg  4 mg IntraVENous Q6H PRN Lump, Bailey A, APRN - CNP        polyethylene glycol (GLYCOLAX) packet 17 g  17 g Oral Daily PRN Lump, Bailey A, APRN - CNP        acetaminophen (TYLENOL) tablet 650 mg  650 mg Oral Q6H PRN Lump, Bailey A, APRN - CNP        Or    acetaminophen (TYLENOL) suppository 650 mg  650 mg Rectal Q6H PRN Lump, Bailey A, APRN - CNP        albuterol (PROVENTIL) (2.5 MG/3ML) 0.083% nebulizer solution 2.5 mg  2.5 mg Nebulization Q6H PRN Lump, Bailey A, APRN - CNP        budesonide (PULMICORT) nebulizer suspension 500 mcg  0.5 mg Nebulization BID RT Sathish Bañuelos MD   500 mcg at 08/21/24 0739    midazolam (VERSED) 1 mg/mL in NS infusion  1-10 mg/hr IntraVENous Continuous Sathish Bañuelos MD   Stopped at 08/16/24 0700    fentaNYL (SUBLIMAZE) injection 25 mcg  25 mcg IntraVENous Q1H PRN Sathihs Bañuelos MD   25 mcg at 08/20/24 1845    norepinephrine (LEVOPHED) 16 mg in sodium chloride 0.9 % 250 mL infusion  1-100 mcg/min IntraVENous Continuous Sathish Bañuelos MD   Stopped at 08/16/24 0700    pantoprazole (PROTONIX) 40 mg in sodium chloride (PF) 0.9 % 10 mL injection  40 mg IntraVENous Q12H Sathish Bañuelos MD   40 mg at 08/20/24 2044    midazolam (VERSED) injection 2 mg  2 mg IntraVENous Q1H PRN Sathish Bañuelos MD        sodium phosphate 15 mmol in sodium chloride 0.9 % 250 mL IVPB  15 mmol IntraVENous PRN Sathish Bañuelos MD   Stopped at 08/11/24 0504       No Known Allergies    ROS:   Unable on ventilator    Vitals:    08/21/24 1145   BP:    Pulse: 80   Resp: 21   Temp:    SpO2: 96%     Admission weight: 90.7 kg (200 lb)    Neurological

## 2024-08-21 NOTE — PROGRESS NOTES
Patient extubated per physician order. Patient extubated in usual fashion. Patient placed on  3 liters/min via nasal cannula.     Marti Mccollum RCP   2:29 PM

## 2024-08-21 NOTE — PROGRESS NOTES
PALLIATIVE CARE PROGRESS NOTE     NAME:  Aiden Black  MEDICAL RECORD NUMBER:  [id number]  AGE: 73 y.o.   GENDER: male  : 1950  TODAY'S DATE:  2024  Room: [facility]4/1104-01    Reason For Consult:  Goals of care evaluation  Distress management  Symptom Management  Guidance and support  Facilitate communications  Assistance in coordinating care  Recommendations for the above    Plan      Palliative Interaction:    Palliative team met with the patient's family - his wife Katrin, sons Richi and Anuel. I introduced myself and my role. Katrin recalls speaking with palliative team after admission.     We discussed patient's hospitalization and events leading up to admission. Katrin stated that he has had shortness of breath and thought that he was getting sick. He told her several days before presenting to urgent care that \"maybe my time is up.\" Patient was intubated after admission.     Family met with critical care and neurology yesterday to discuss plan of care moving forward. Patient was diagnosed with liver cirrhosis within the last year. Katrin states that years ago they sat down together to discuss their wishes and completed DPOAH and living collins with DNR at that time. She states that patient made it very clear that he would never want to reside in a nursing facility.     Family had a discussion yesterday after speaking with the medical team and decided that they would like to move towards comfort care and hospice. Explained the process of extubation with comfort medications for pain/anxiety/secretions. They plan to call family members to visit today and will likely withdraw care later today. We discussed his current medications including lactulose for his ammonia levels - family would like to refrain from administering medications as family will be visiting today. Feeding tube was also discussed - they requested to turn off feeding while family is visiting since tube will be removed.     Patient had blood  incomplete suppression of the T2 FLAIR signal along  the sulci of the cerebral hemispheres bilaterally.  This appears not as  severe as on the prior exam.  Again this may be artifactual in nature versus  proteinaceous fluid or subarachnoid hemorrhage.  However, no evidence of  leptomeningeal enhancement.  3. Mild global parenchymal volume loss with mild-to-moderate chronic  microvascular ischemic changes.  4. No convincing abnormal enhancement within the brain.  5. Bilateral mastoid effusions.      08/09/24    ECHO (TTE) COMPLETE (PRN CONTRAST/BUBBLE/STRAIN/3D) 08/13/2024  8:16 AM (Final)    Interpretation Summary    Left Ventricle: Normal left ventricular systolic function with a visually estimated EF of 50 - 55%. Left ventricle size is normal. Normal wall thickness. Mild hypokinesis of the following segments: mid inferoseptal. Abnormal diastolic function.    Aortic Valve: Trileaflet valve.    Mitral Valve: Mildly thickened leaflets. Mild regurgitation.    Image quality is fair.    Signed by: Bryce Gillis MD on 8/13/2024  8:16 AM       Encounter Date: 08/09/24   EKG 12 Lead   Result Value    Ventricular Rate 76    Atrial Rate 76    P-R Interval 194    QRS Duration 74    Q-T Interval 364    QTc Calculation (Bazett) 409    P Axis -42    R Axis 82    T Axis 88    Narrative    Unusual P axis, possible ectopic atrial rhythm with Premature atrial complexes  Low voltage QRS  Septal infarct (cited on or before 15-JUL-2024)  Abnormal ECG  When compared with ECG of 12-AUG-2024 10:17,  Ectopic atrial rhythm has replaced Sinus rhythm  Questionable change in initial forces of Anterior leads         has a past medical history of Arthritis, Cirrhosis (HCC), Emphysema lung (HCC), GI bleed, Hypertension, and MI (myocardial infarction) (HCC).    History reviewed. No pertinent family history.    Social History     Tobacco Use    Smoking status: Former     Types: Cigarettes    Smokeless tobacco: Never   Vaping Use    Vaping status:

## 2024-08-21 NOTE — PLAN OF CARE
Problem: Discharge Planning  Goal: Discharge to home or other facility with appropriate resources  8/21/2024 0730 by Licha Davalos RN  Outcome: Progressing     Problem: Safety - Medical Restraint  Goal: Remains free of injury from restraints (Restraint for Interference with Medical Device)  Description: INTERVENTIONS:  1. Determine that other, less restrictive measures have been tried or would not be effective before applying the restraint  2. Evaluate the patient's condition at the time of restraint application  3. Inform patient/family regarding the reason for restraint  4. Q2H: Monitor safety, psychosocial status, comfort, nutrition and hydration  8/21/2024 0730 by Licha Davalos RN  Outcome: Progressing  Flowsheets  Taken 8/21/2024 0600 by Claire Arriaga RN  Remains free of injury from restraints (restraint for interference with medical device):   Determine that other, less restrictive measures have been tried or would not be effective before applying the restraint   Evaluate the patient's condition at the time of restraint application   Inform patient/family regarding the reason for restraint   Every 2 hours: Monitor safety, psychosocial status, comfort, nutrition and hydration  Taken 8/21/2024 0400 by Claire Arriaga RN  Remains free of injury from restraints (restraint for interference with medical device):   Determine that other, less restrictive measures have been tried or would not be effective before applying the restraint   Evaluate the patient's condition at the time of restraint application   Inform patient/family regarding the reason for restraint   Every 2 hours: Monitor safety, psychosocial status, comfort, nutrition and hydration  Taken 8/21/2024 0200 by Claire Arriaga RN  Remains free of injury from restraints (restraint for interference with medical device):   Determine that other, less restrictive measures have been tried or would not be effective before applying the  restraint   Evaluate the patient's condition at the time of restraint application   Inform patient/family regarding the reason for restraint   Every 2 hours: Monitor safety, psychosocial status, comfort, nutrition and hydration     Problem: Safety - Adult  Goal: Free from fall injury  8/21/2024 0730 by Licha Davalos RN  Outcome: Progressing     Problem: Respiratory - Adult  Goal: Achieves optimal ventilation and oxygenation  8/21/2024 0730 by Licha Davalos RN  Outcome: Progressing     Problem: Skin/Tissue Integrity  Goal: Absence of new skin breakdown  Description: 1.  Monitor for areas of redness and/or skin breakdown  2.  Assess vascular access sites hourly  3.  Every 4-6 hours minimum:  Change oxygen saturation probe site  4.  Every 4-6 hours:  If on nasal continuous positive airway pressure, respiratory therapy assess nares and determine need for appliance change or resting period.  8/21/2024 0730 by Licha Davalos RN  Outcome: Progressing     Problem: ABCDS Injury Assessment  Goal: Absence of physical injury  8/21/2024 0730 by Licha Davalos RN  Outcome: Progressing     Problem: Pain  Goal: Verbalizes/displays adequate comfort level or baseline comfort level  8/21/2024 0730 by Licha Davalos RN  Outcome: Progressing     Problem: Nutrition Deficit:  Goal: Optimize nutritional status  8/21/2024 0730 by Licha Davalos RN  Outcome: Progressing

## 2024-08-21 NOTE — CARE COORDINATION
DC Planning    Discussed case w ICU  for terminal wean today.    See previous message spoke to mom 103.0 temp last night came down with advil. This morning temp 101.8 Mom states cough worse also she states they put humidifier in his room last night. Mom states keeping pt hydrated but loss of appetite. Please advise.

## 2024-08-21 NOTE — PROGRESS NOTES
Pulmonary Critical Care Progress Note    Patient seen for the follow up of COPD exacerbation (HCC)     Subjective:    Had increased ammonia further and is now unresponsive on full vent support FiO2 30% tidal volume 550 rate of 18 PEEP of 8.  He is off sedation.  Tube feeds are held   .     Examination:    Vitals: /66   Pulse 74   Temp 98.9 °F (37.2 °C) (Axillary)   Resp 17   Ht 1.803 m (5' 11\")   Wt 120.5 kg (265 lb 10.5 oz)   SpO2 96%   BMI 37.05 kg/m²   SpO2  Av.6 %  Min: 96 %  Max: 99 %  General appearance: Intubated unresponsive  Neck: No JVD  Lungs: Decreased breath sound no crackles or wheeze labored  Heart: regular rate and rhythm, S1, S2 normal, no gallop  Abdomen: Soft, non tender, + BS  Extremities: no cyanosis or clubbing. No significant edema    LABs:    CBC:   Recent Labs     24  0420 24  0403   WBC 10.7 13.1*   HGB 12.3* 12.6*   HCT 37.2* 37.7*   PLT 52* 53*     BMP:   Recent Labs     24  0420 24  0403    140   K 5.6* 5.4*   CO2 31 28   BUN 63* 67*   CREATININE 1.1 1.1   LABGLOM 70 70   GLUCOSE 188* 203*         LIVER PROFILE:  Recent Labs     24  0420 24  0403   AST 69* 110*   * 181*      Latest Reference Range & Units 24 04:29   POC HCO3 21.0 - 28.0 mmol/L 21.7   POC O2 SAT 94.0 - 98.0 % 98.2 (H)   POC pCO2 35.0 - 48.0 mm Hg 36.7   POC pH 7.350 - 7.450  7.379   POC PO2 83.0 - 108.0 mm Hg 110.0 (H)   (H): Data is abnormally high     Latest Reference Range & Units 24 12:55 24 03:43 08/15/24 09:00 24 04:12   Ammonia 16 - 60 umol/L 62 (H) 58 92 (H) 127 (HH)   (HH): Data is critically high  (H): Data is abnormally high    Radiology:    Chest x-ray 8/21  Reviewed  1. No acute cardiopulmonary process.  2. Support tubes unchanged.     MRI brain  1. Patient motion limits evaluation.  2. Questionable areas of incomplete suppression of the T2 FLAIR signal along  the sulci of the cerebral hemispheres bilaterally.  This    Hyperkalemia  Monitor potassium    hyperglycemia on steroids  Monitor blood sugars  Insulin scale    suspected sleep apnea   Outpatient sleep evaluation     discussed again today with wife ; she wants to go ahead and withdraw care today with hospice consult in case patient survives.  Extubate when family ready  Discussed with palliative care  Discussed with RN  Discussed with clinical pharmacist    Peptic ulcer disease prophylaxis with Protonix 40 IV twice daily  DVT prophylaxis EPC    Sathish Bañuelos MD, MD, [facility]  Pulmonary Critical Care and Sleep Medicine,  [facility]  Cell: [phone]  Office: [phone]

## 2024-08-21 NOTE — PLAN OF CARE
Problem: Respiratory - Adult  Goal: Achieves optimal ventilation and oxygenation  8/20/2024 2002 by Sagar Loza RCP  Outcome: Progressing     Problem: Respiratory - Adult  Goal: Able to breathe comfortably  8/20/2024 2002 by Sagar Loza RCP  Outcome: Progressing     Problem: Respiratory - Adult  Goal: Patient's breath sounds will be clear and equal  8/20/2024 2002 by Sagar Loza RCP  Outcome: Progressing     Problem: Respiratory - Adult  Goal: Adequate oxygenation  Description: Adequate oxygenation  8/20/2024 2002 by Sagar Loza RCP  Outcome: Progressing     Problem: Respiratory - Adult  Goal: Will be able to breathe spontaneously, without ventilator support  Description: Will be able to breathe spontaneously, without ventilator support  8/20/2024 2002 by Sagar Loza RCP  Outcome: Progressing

## 2024-08-21 NOTE — PLAN OF CARE
Problem: Discharge Planning  Goal: Discharge to home or other facility with appropriate resources  8/21/2024 0104 by Claire Arriaga, RN  Outcome: Progressing  8/20/2024 1806 by Angelica Hodgson RN  Outcome: Progressing  Flowsheets (Taken 8/20/2024 0700)  Discharge to home or other facility with appropriate resources: Identify barriers to discharge with patient and caregiver     Problem: Safety - Medical Restraint  Goal: Remains free of injury from restraints (Restraint for Interference with Medical Device)  Description: INTERVENTIONS:  1. Determine that other, less restrictive measures have been tried or would not be effective before applying the restraint  2. Evaluate the patient's condition at the time of restraint application  3. Inform patient/family regarding the reason for restraint  4. Q2H: Monitor safety, psychosocial status, comfort, nutrition and hydration  8/21/2024 0104 by Claire Arriaga, RN  Outcome: Progressing  Flowsheets  Taken 8/21/2024 0000  Remains free of injury from restraints (restraint for interference with medical device):   Determine that other, less restrictive measures have been tried or would not be effective before applying the restraint   Evaluate the patient's condition at the time of restraint application   Inform patient/family regarding the reason for restraint   Every 2 hours: Monitor safety, psychosocial status, comfort, nutrition and hydration  Taken 8/20/2024 2200  Remains free of injury from restraints (restraint for interference with medical device):   Determine that other, less restrictive measures have been tried or would not be effective before applying the restraint   Evaluate the patient's condition at the time of restraint application   Inform patient/family regarding the reason for restraint   Every 2 hours: Monitor safety, psychosocial status, comfort, nutrition and hydration  Taken 8/20/2024 2000  Remains free of injury from restraints (restraint for  would not be effective before applying the restraint  Taken 8/20/2024 0800 by Angelica Hodgson RN  Remains free of injury from restraints (restraint for interference with medical device): Determine that other, less restrictive measures have been tried or would not be effective before applying the restraint  Taken 8/20/2024 0600 by Claire Arriaga RN  Remains free of injury from restraints (restraint for interference with medical device):   Determine that other, less restrictive measures have been tried or would not be effective before applying the restraint   Evaluate the patient's condition at the time of restraint application   Inform patient/family regarding the reason for restraint   Every 2 hours: Monitor safety, psychosocial status, comfort, nutrition and hydration     Problem: Safety - Adult  Goal: Free from fall injury  8/21/2024 0104 by Claire Arriaga RN  Outcome: Progressing  Flowsheets (Taken 8/21/2024 0100)  Free From Fall Injury:   Instruct family/caregiver on patient safety   Based on caregiver fall risk screen, instruct family/caregiver to ask for assistance with transferring infant if caregiver noted to have fall risk factors  8/20/2024 1806 by Angelica Hodgson RN  Outcome: Progressing  Flowsheets (Taken 8/20/2024 1730)  Free From Fall Injury: Instruct family/caregiver on patient safety     Problem: Respiratory - Adult  Goal: Achieves optimal ventilation and oxygenation  8/21/2024 0104 by Claire Arriaga RN  Outcome: Progressing  8/20/2024 2002 by Sagar Loza RCP  Outcome: Progressing  Flowsheets (Taken 8/20/2024 2000 by Claire Arriaga RN)  Achieves optimal ventilation and oxygenation:   Assess for changes in respiratory status   Assess for changes in mentation and behavior   Position to facilitate oxygenation and minimize respiratory effort   Initiate smoking cessation protocol as indicated   Oxygen supplementation based on oxygen saturation or arterial blood

## 2024-08-22 VITALS
HEART RATE: 68 BPM | TEMPERATURE: 98.1 F | WEIGHT: 265.65 LBS | HEIGHT: 71 IN | RESPIRATION RATE: 17 BRPM | OXYGEN SATURATION: 93 % | BODY MASS INDEX: 37.19 KG/M2 | SYSTOLIC BLOOD PRESSURE: 85 MMHG | DIASTOLIC BLOOD PRESSURE: 45 MMHG

## 2024-08-22 PROCEDURE — 99233 SBSQ HOSP IP/OBS HIGH 50: CPT | Performed by: PSYCHIATRY & NEUROLOGY

## 2024-08-22 PROCEDURE — 2580000003 HC RX 258: Performed by: NURSE PRACTITIONER

## 2024-08-22 PROCEDURE — 6360000002 HC RX W HCPCS: Performed by: NURSE PRACTITIONER

## 2024-08-22 RX ADMIN — SODIUM CHLORIDE, PRESERVATIVE FREE 10 ML: 5 INJECTION INTRAVENOUS at 09:37

## 2024-08-22 RX ADMIN — HYDROMORPHONE HYDROCHLORIDE 0.5 MG: 1 INJECTION, SOLUTION INTRAMUSCULAR; INTRAVENOUS; SUBCUTANEOUS at 14:15

## 2024-08-22 RX ADMIN — HYDROMORPHONE HYDROCHLORIDE 0.5 MG: 1 INJECTION, SOLUTION INTRAMUSCULAR; INTRAVENOUS; SUBCUTANEOUS at 09:38

## 2024-08-22 ASSESSMENT — PAIN SCALES - GENERAL
PAINLEVEL_OUTOF10: 0
PAINLEVEL_OUTOF10: 0

## 2024-08-22 NOTE — PROGRESS NOTES
Pt PICC removed. No complications. Pt going in pt hospice. Transport here to get pt. Report given. Family at bedside. Telemetry removed. Roca in place.

## 2024-08-22 NOTE — PROGRESS NOTES
End Of Shift Note  [facility]  Summary of shift: Uneventful shift. Patient did not have a BM this shift, and had over 1000mL urine output from griffith. Patient did not have any secretions noted, so Robinul not given this shift. Patients family at bedside throughout night. Plan to meet with hospice/ palliative this AM. No morning labs drawn. Patient turned Q2 throughout shift, tolerating well. Pt resting comfortably in bed at end of shift.     Vitals:    Vitals:    08/22/24 0600 08/22/24 0615 08/22/24 0630 08/22/24 0645   BP:       Pulse: 71 68 70 70   Resp: 24 25 25 27   Temp:       TempSrc:       SpO2:       Weight:       Height:            I&O:   Intake/Output Summary (Last 24 hours) at 8/22/2024 0657  Last data filed at 8/21/2024 1830  Gross per 24 hour   Intake --   Output 625 ml   Net -625 ml       Resp Status: Patient remains on 3L NC     Ventilator Settings:  Vent Mode: AC/PRVC Resp Rate (Set): 14 bpm/Vt (Set, mL): 550 mL/ /FiO2 : 30 %    Critical Care IV infusions:   dextrose      sodium chloride 10 mL/hr at 08/11/24 0628        LDA:   PICC 08/10/24 Left Brachial (Active)   Number of days: 11       Urinary Catheter 08/10/24 Griffith (Active)   Number of days: 12

## 2024-08-22 NOTE — PROGRESS NOTES
Physical Therapy  DATE: 2024    NAME: Aiden Black  MRN: [id number]   : 1950    Patient not seen this date for Physical Therapy due to:      [] Cancel by RN or physician due to:    [] Hemodialysis    [] Critical Lab Value Level     [] Blood transfusion in progress    [] Acute or unstable cardiovascular status   _MAP < 55 or more than >115  _HR < 40 or > 130    [] Acute or unstable pulmonary status   -FiO2 > 60%   _RR < 5 or >40    _O2 sats < 85%    [] Strict Bedrest    [] Off Unit for surgery or procedure    [] Off Unit for testing       [] Pending imaging to R/O fracture    [] Refusal by Patient      [] Other      [] PT being discontinued at this time. Patient independent. No further needs.     [x] PT being discontinued at this time as the patient began terminal wean yesterday and has hospice meeting today. CARLOS Seth reports patient not appropriate for PT and appropriate to take off caseload. No further needs.      Natalie Malone, PT

## 2024-08-22 NOTE — CARE COORDINATION
DC Planning    Spoke w Sunshine from [facility], pt/family agrees to [facility] [place] closer to pt home.  Pt to dc today 4:15 per Leeroy. Discussed w Dr. Damaos alexander for dc to Hospice. Transport paperwork completed at ICU desk-will need packet w DNRCC form for transport.

## 2024-08-22 NOTE — PROGRESS NOTES
Active problem Toxic metabolic encephalopathy . Respiratory failure . Hyperammonemia . The condition is he has been extubated being obtunded with minor grimace to stimulation . Hospice to see patient today . Ammonia is 203  . FU MRI of Head showing generalized atrophy with chronic periventricular small vessel ischemia.  Long discussion with family and critical care . Wife does not want to prolong condition feeling that he would not have want to be on ventilator or in extended care facility choosing to proceed with withdrawal of care . 72 yo male with mental status changes . Patient was admitted on August 10 from outside hospital with COPD exacerbation with CO2 retention needing intubation with mechanical ventilation  Patiet was on IV versed and fentanyl drip being obtunded with neurology asked to see for decrease responsiveness . Head CT with generalized atrophy with mild chronic periventricular small vessel ischemia . MRI of Head with generalized atrophy with chronic periventricular small vessel ischemia . EEG moderate diffuse slowing . NH3 has been elevated at 127 placed on lactulose and xifaxan  . CSF analysis 2 WBC , 1 RBC , total protein 181 , glucose 79 . Sedation has been held becoming more responsive following some command placed back on IV precedex for restlessness . Significant medications lactulose., xifaxan , precedex Testing   Head CT with generalized atrophy with mild chronic periventricular small vessel ischemia . MRI of [place] with generalized atrophy with chronic periventricular small vessel ischemia . EEG moderate diffuse slowing . NH3 has been eevatd at 127 currently 120 placed on lactulose . CSF analysis 2 WBC , 1 RBC , total protein 181 , glucose 79 . FU MRI of Head showing generalized atrophy with chronic periventricular small vessel ischemia.     Past Medical History:   Diagnosis Date    Arthritis     Cirrhosis (HCC)     Emphysema lung (HCC)     GI bleed     Hypertension     MI (myocardial

## 2024-08-22 NOTE — PLAN OF CARE
Problem: Discharge Planning  Goal: Discharge to home or other facility with appropriate resources  Recent Flowsheet Documentation  Taken 8/21/2024 2000 by Ese Tovar RN  Discharge to home or other facility with appropriate resources:   Identify barriers to discharge with patient and caregiver   Arrange for needed discharge resources and transportation as appropriate   Identify discharge learning needs (meds, wound care, etc)   Refer to discharge planning if patient needs post-hospital services based on physician order or complex needs related to functional status, cognitive ability or social support system     Problem: Safety - Medical Restraint  Goal: Remains free of injury from restraints (Restraint for Interference with Medical Device)  Description: INTERVENTIONS:  1. Determine that other, less restrictive measures have been tried or would not be effective before applying the restraint  2. Evaluate the patient's condition at the time of restraint application  3. Inform patient/family regarding the reason for restraint  4. Q2H: Monitor safety, psychosocial status, comfort, nutrition and hydration  Outcome: Progressing     Problem: Safety - Adult  Goal: Free from fall injury  Outcome: Progressing  Flowsheets (Taken 8/22/2024 0641)  Free From Fall Injury:   Instruct family/caregiver on patient safety   Based on caregiver fall risk screen, instruct family/caregiver to ask for assistance with transferring infant if caregiver noted to have fall risk factors     Problem: Respiratory - Adult  Goal: Achieves optimal ventilation and oxygenation  Outcome: Progressing  Flowsheets (Taken 8/21/2024 2000)  Achieves optimal ventilation and oxygenation:   Assess for changes in respiratory status   Assess for changes in mentation and behavior   Position to facilitate oxygenation and minimize respiratory effort   Oxygen supplementation based on oxygen saturation or arterial blood gases   Assess the need for suctioning and  aspirate as needed   Respiratory therapy support as indicated   Assess and instruct to report shortness of breath or any respiratory difficulty     Problem: Skin/Tissue Integrity  Goal: Absence of new skin breakdown  Description: 1.  Monitor for areas of redness and/or skin breakdown  2.  Assess vascular access sites hourly  3.  Every 4-6 hours minimum:  Change oxygen saturation probe site  4.  Every 4-6 hours:  If on nasal continuous positive airway pressure, respiratory therapy assess nares and determine need for appliance change or resting period.  Outcome: Progressing     Problem: ABCDS Injury Assessment  Goal: Absence of physical injury  Outcome: Progressing  Flowsheets (Taken 8/22/2024 0641)  Absence of Physical Injury: Implement safety measures based on patient assessment     Problem: Pain  Goal: Verbalizes/displays adequate comfort level or baseline comfort level  Outcome: Progressing  Flowsheets  Taken 8/22/2024 0000  Verbalizes/displays adequate comfort level or baseline comfort level:   Encourage patient to monitor pain and request assistance   Assess pain using appropriate pain scale   Administer analgesics based on type and severity of pain and evaluate response  Taken 8/21/2024 2000  Verbalizes/displays adequate comfort level or baseline comfort level:   Encourage patient to monitor pain and request assistance   Assess pain using appropriate pain scale   Administer analgesics based on type and severity of pain and evaluate response   Implement non-pharmacological measures as appropriate and evaluate response     Problem: Nutrition Deficit:  Goal: Optimize nutritional status  Outcome: Progressing

## 2024-08-22 NOTE — CARE COORDINATION
DC [place]    Spoke with brittni Shelton from palliative care, she discussed Hospice w pt wife, prfers [facility] [place] if possible. Called [facility] spoke with Osiris she will call wife and make arrangements for a meeting.

## 2024-08-22 NOTE — PLAN OF CARE
Spoke with RN unit manager regarding hospice consult - patient was stable overnight. Discussed with case management. I called and spoke with patient's wife Katrin. We discussed hospice consult - explained options of home hospice, inpatient, and nursing facility with hospice. [facility] of choice provided. Katrin states her sister was admitted to inpatient unit on [place] and she would like to use their services - hospice of [facility]. Explained that hospice will reach out to her to set up a time to meet. Consult placed. Case management notified and will contact hospice with patient information.

## 2024-08-22 NOTE — DISCHARGE SUMMARY
culture     Hyperglycemia  - IV fluids switched to normal saline  - 400s-->122     Cirrhosis/thrombocytopenia  Patient has had history of esophageal varices/hyperammonemia  Patient has OG tube.  Monitor for bleeding  PPI twice a day  Monitor LFTs  Currently on lactulose and Xifaxan   Liver ultrasound - cirrhotic liver and hepatomegaly  GI consult - no egd planned.   AFP 2.1  PPI twice daily  Trend LFTs     Hyperkalemia  Lokelma as needed  Monitor potassium     Sinus Bradycardia  - cardiology following. No evidence of High grade AV block or conduction disease. Continue to monitor. Echo .           Palliative on board. Patient has continued decline in mental status despite being homeless.  Family changed her CODE STATUS to DNRCC and decided to take to hospice           Guarded prognosis               At the time of discharge patient was hemodynamically stable and asymptomatic.    The plan was discussed in detail with  family members including wife, who agreed with the plan and verbalized understanding .    The patient was seen and examined on day of discharge and this discharge summary is in conjunction with any daily progress note from day of discharge.    Hospital Data:    Labs:    Hematology:  Recent Labs     08/20/24 0420 08/21/24  0403   WBC 10.7 13.1*   RBC 3.49* 3.53*   HGB 12.3* 12.6*   HCT 37.2* 37.7*   .6* 106.8*   MCH 35.2* 35.7*   MCHC 33.1 33.4   RDW 14.8* 15.5*   PLT 52* 53*   MPV 11.4 11.3     Chemistry:  Recent Labs     08/19/24  1737 08/19/24  2250 08/20/24  0420 08/21/24  0403     --  142 140   K 5.9* 6.0* 5.6* 5.4*     --  105 103   CO2 27  --  31 28   GLUCOSE 145*  --  188* 203*   BUN 54*  --  63* 67*   CREATININE 1.0  --  1.1 1.1   ANIONGAP 7*  --  6* 9   LABGLOM 78  --  70 70   CALCIUM 8.6  --  8.5* 8.8     Recent Labs     08/19/24  2320 08/20/24  0420 08/20/24  0531 08/20/24  1155 08/20/24  1753 08/20/24  2312 08/21/24  0403 08/21/24  0509   AST  --  69*  --   --   --   --   completion of the assessment.

## 2024-08-22 NOTE — PROGRESS NOTES
Pulmonary Critical Care Progress Note    Patient seen for the follow up of COPD exacerbation (HCC)     Subjective:    He is extubated now on oxygen 2 L.  He is not responsive.  He is off sedation.  NG removed.    Examination:    Vitals: BP (!) 85/45   Pulse 67   Temp 98.1 °F (36.7 °C) (Axillary)   Resp 21   Ht 1.803 m (5' 11\")   Wt 120.5 kg (265 lb 10.5 oz)   SpO2 93%   BMI 37.05 kg/m²   SpO2  Av.3 %  Min: 93 %  Max: 97 %  General appearance: unresponsive to painful stimuli  Neck: No JVD  Lungs: Decreased breath sound no crackles or wheeze labored  Heart: regular rate and rhythm, S1, S2 normal, no gallop  Abdomen: Soft, non tender, + BS  Extremities: no cyanosis or clubbing. No significant edema    LABs:    CBC:   Recent Labs     24  0420 24  0403   WBC 10.7 13.1*   HGB 12.3* 12.6*   HCT 37.2* 37.7*   PLT 52* 53*     BMP:   Recent Labs     24  0420 24  0403    140   K 5.6* 5.4*   CO2 31 28   BUN 63* 67*   CREATININE 1.1 1.1   LABGLOM 70 70   GLUCOSE 188* 203*         LIVER PROFILE:  Recent Labs     24  0420 24  0403   AST 69* 110*   * 181*      Latest Reference Range & Units 24 04:29   POC HCO3 21.0 - 28.0 mmol/L 21.7   POC O2 SAT 94.0 - 98.0 % 98.2 (H)   POC pCO2 35.0 - 48.0 mm Hg 36.7   POC pH 7.350 - 7.450  7.379   POC PO2 83.0 - 108.0 mm Hg 110.0 (H)   (H): Data is abnormally high     Latest Reference Range & Units 24 12:55 24 03:43 08/15/24 09:00 24 04:12   Ammonia 16 - 60 umol/L 62 (H) 58 92 (H) 127 (HH)   (HH): Data is critically high  (H): Data is abnormally high    Radiology:    Chest x-ray   Reviewed  1. No acute cardiopulmonary process.  2. Support tubes unchanged.     MRI brain  1. Patient motion limits evaluation.  2. Questionable areas of incomplete suppression of the T2 FLAIR signal along  the sulci of the cerebral hemispheres bilaterally.  This appears not as  severe as on the prior exam.  Again this may be  artifactual in nature versus  proteinaceous fluid or subarachnoid hemorrhage.  However, no evidence of  leptomeningeal enhancement.  3. Mild global parenchymal volume loss with mild-to-moderate chronic  microvascular ischemic changes.  4. No convincing abnormal enhancement within the brain.  5. Bilateral mastoid effusions.    Echocardiogram    Left Ventricle: Normal left ventricular systolic function with a visually estimated EF of 50 - 55%. Left ventricle size is normal. Normal wall thickness. Mild hypokinesis of the following segments: mid inferoseptal. Abnormal diastolic function.    Aortic Valve: Trileaflet valve.    Mitral Valve: Mildly thickened leaflets. Mild regurgitation.    Image quality is fair.       Impression/recommendations;    Acute hypoxic and hypercarbic respiratory failure requiring intubation  Oxygen by nasal cannula     COPD exacerbation  DuoNeb by nebulizer every 4 hours  Pulmicort 0.5 twice daily by nebulizer  Brovana by nebulizer twice daily         Hypotension requiring pressors/bradycardia  Monitor blood pressure off Levophed.  Cardiology consult     liver cirrhosis history esophageal varices/thrombocytopenia  GI on consult     CO2 narcosis/possible encephalopathy with normalized ammonia  Monitor mental status off sedation  Neurology on consult         Hyperkalemia  Monitor potassium    hyperglycemia on steroids  Monitor blood sugars  Insulin scale    suspected sleep apnea        discussed again today with wife and family at bedside  DNR CC  Meds discontinued    Peptic ulcer disease prophylaxis with Protonix 40 IV twice daily  DVT prophylaxis EPC    Sathish Bañuelos MD, MD, [facility]  Pulmonary Critical Care and Sleep Medicine,  [facility]  Cell: [phone]  Office: [phone]